# Patient Record
Sex: FEMALE | Race: WHITE | Employment: FULL TIME | URBAN - METROPOLITAN AREA
[De-identification: names, ages, dates, MRNs, and addresses within clinical notes are randomized per-mention and may not be internally consistent; named-entity substitution may affect disease eponyms.]

---

## 2017-02-07 ENCOUNTER — HOSPITAL ENCOUNTER (OUTPATIENT)
Dept: ULTRASOUND IMAGING | Facility: CLINIC | Age: 42
Discharge: HOME | End: 2017-02-07
Attending: INTERNAL MEDICINE | Admitting: INTERNAL MEDICINE
Payer: COMMERCIAL

## 2017-02-07 DIAGNOSIS — E04.1 THYROID NODULE: ICD-10-CM

## 2017-02-07 PROCEDURE — 00000102 ZZHCL STATISTIC CYTO WRIGHT STAIN TC: Performed by: RADIOLOGY

## 2017-02-07 PROCEDURE — 88173 CYTOPATH EVAL FNA REPORT: CPT | Performed by: RADIOLOGY

## 2017-02-07 PROCEDURE — 76942 ECHO GUIDE FOR BIOPSY: CPT

## 2017-02-07 PROCEDURE — 88173 CYTOPATH EVAL FNA REPORT: CPT | Mod: 26 | Performed by: RADIOLOGY

## 2017-02-07 NOTE — PROGRESS NOTES
RADIOLOGY PROCEDURE NOTE  Patient name: Kathi Linn  MRN: [id number]  : 1975    Pre-procedure diagnosis: Right thyroid nodule  Post-procedure diagnosis: Same    Procedure Date/Time: 2017  10:32 AM  Procedure: US guided biopsy  Estimated blood loss: None  Specimen(s) collected with description: Thyroid nodule tissue  The patient tolerated the procedure well with no immediate complications.  Significant findings:4 FNA  passes made and submitted to pathology.    See imaging dictation for procedural details.    Provider name: Rogers Cortez  Assistant(s):None

## 2017-02-07 NOTE — PROGRESS NOTES
Repeat single right thyroid nodule well tolerated by Dr. Cortez.  Sterile dressing to site after pressure held.  DC instructions reviewed and understood.  Pt dc ambulatory to home per self with no evident bleeding or complications.

## 2017-02-08 LAB — COPATH REPORT: NORMAL

## 2017-02-08 ASSESSMENT — ENCOUNTER SYMPTOMS
DIZZINESS: 0
HEADACHES: 1
TINGLING: 0
EYE WATERING: 0
EYE PAIN: 1
TREMORS: 0
DOUBLE VISION: 0
NUMBNESS: 0
PARALYSIS: 0
EYE IRRITATION: 1
EYE REDNESS: 1
SPEECH CHANGE: 0
DISTURBANCES IN COORDINATION: 0
MEMORY LOSS: 0
WEAKNESS: 0
SEIZURES: 0
LOSS OF CONSCIOUSNESS: 0

## 2017-02-13 ENCOUNTER — OFFICE VISIT (OUTPATIENT)
Dept: ENDOCRINOLOGY | Facility: CLINIC | Age: 42
End: 2017-02-13

## 2017-02-13 VITALS
HEIGHT: 69 IN | DIASTOLIC BLOOD PRESSURE: 74 MMHG | WEIGHT: 198.12 LBS | HEART RATE: 58 BPM | SYSTOLIC BLOOD PRESSURE: 112 MMHG | BODY MASS INDEX: 29.35 KG/M2

## 2017-02-13 DIAGNOSIS — E04.1 THYROID NODULE: Primary | ICD-10-CM

## 2017-02-13 ASSESSMENT — PAIN SCALES - GENERAL: PAINLEVEL: NO PAIN (0)

## 2017-02-13 NOTE — PROGRESS NOTES
Endocrine Clinic Consult:     Reason for consult: Thyroid Nodule  Date: October 3, 2016  Referring Physician: Reno Oates      HPI:     Kathi Linn is a 41 year old year old female who presents for evaluation of  Thyroid Nodule. Kathi had some neck pain on the LEFT side of the neck and had difficulty swallowing back in summer. She felt a lump back then and also some voice change. She had pain radiating to the ear and therefore went to see the PCP who ordered labs that showed a normal TSH and negative TPO but a RIGHT sided thyroid nodule.     The US performed on 9/13/16 was reviewed including imaging were shown and discussed with patient.     Since then she has improved swallowing, neck pain has abated. She has not had ear pain. She denies preceding viral infection.     FNA of the right sided nodule was done and AUS was repeated.   Repeat FNA was done 1 week ago of the same nodule and benign result was reported.     Risk Factors:   No history of childhood radiation exposure, No history of thyroid cancer in the family, No history of goiter in the family and No history of autoimmune disorders in the family.     Clinical symptom assessment  Voice change noted by patient.   No history of heat intolerance, tremor, palpitation, excessive sweating, sleep disturbance, appetite changes, changes in bowel habits, weight changes, dry eye, changes in vision and diplopia     Other ROS:   Weight gain 6 lbs  No eye symptoms  No headache, change in vision, loss of peripheral vision  No neck pain, no other lumps in the neck  No change in breathing   No change in swallowing.   No swelling in extremities  No change in mental status.   Other ROS that were obtained were negative.     Menstrual history  LMP 1 month ago  Periods getting heavier  Contemplating IUD    Past Medical History   Diagnosis Date     Anemia      Gastro-oesophageal reflux disease      Hx of gastric ulcer      Irritable bowel syndrome      improved since  gastric surgery     Lumbago 1991     With Right Leg Radiation  MRI=L5-S1 disc     Mild intermittent asthma 9/19/2006     Obesity, unspecified      298# prior to gastric Bypass     Pneumonia, organism unspecified 05/00     RLL     Past Surgical History   Procedure Laterality Date     Cholecystectomy       C/section, classical  1999, 2002     Abdomen surgery       DUODENAL SWITCH  2007     [place] teeth[       Herniorrhaphy ventral  10/2/2013     Procedure: HERNIORRHAPHY VENTRAL;  Open Ventral hernia Repair with mesh;  Surgeon: Orly Dunlap MD;  Location: [facility] OR     Family History   Problem Relation Age of Onset     CANCER Father      NON-HODGKINS LYMPHOMA, DIABETIC ALSO     OSTEOPOROSIS Mother      Hypertension Mother      CEREBROVASCULAR DISEASE Mother      Depression Sister      LYMES DISEASE     Aneurysm Paternal Grandfather      age 41      CEREBROVASCULAR DISEASE Paternal Aunt      CEREBROVASCULAR DISEASE Maternal Grandmother      Melanoma Maternal Grandmother      CANCER Paternal Grandmother      lung     CANCER Maternal Grandfather      lung     Social History     Social History     Marital status:      Spouse name: N/A     Number of children: N/A     Years of education: N/A     Occupational History     Not on file.     Social History Main Topics     Smoking status: Former Smoker     Packs/day: 0.10     Years: 2.00     Quit date: 1/1/1998     Smokeless tobacco: Never Used      Comment: QUIT in 1998     Alcohol use 0.0 oz/week     0 Standard drinks or equivalent per week      Comment: 3 DRINKS PER WEEK     Drug use: No     Sexual activity: Yes     Partners: Male     Other Topics Concern     Not on file     Social History Narrative        Allergies   Allergen Reactions     Corticosteroids      flovent, pulmacort rash on face     Current Outpatient Prescriptions   Medication     albuterol (PROAIR HFA/PROVENTIL HFA/VENTOLIN HFA) 108 (90 BASE) MCG/ACT Inhaler     Zinc Acetate, Oral, (ZINC ACETATE  "PO)     naproxen sodium (ANAPROX) 550 MG tablet     ferrous gluconate (FERGON) 324 (38 FE) MG tablet     ibuprofen (ADVIL,MOTRIN) 200 MG tablet     cholecalciferol (VITAMIN D3) 5000 UNITS CAPS capsule     Magnesium Hydroxide (MAGNESIA PO)     BIOTIN     VITAMIN E     VITAMIN K PO     VITAMIN A EX     MULTIVITAMIN TABS   OR     CALCIUM 500 MG OR TABS     No current facility-administered medications for this visit.            Exam:  /74 (BP Location: Right arm, Patient Position: Chair, Cuff Size: Adult Large)  Pulse 58  Ht 1.753 m (5' 9\")  Wt 89.9 kg (198 lb 1.9 oz)  BMI 29.26 kg/m2    Constitutional: healthy, alert, no distress and cooperative  Head: Normocephalic. No masses, lesions, tenderness or abnormalities  Neck: Neck supple. No adenopathy. Thyroid  Slight asymmetry in the right side, normal size, could feel the lower border thyroid both sides. Carotids without bruits.  ENT: No throat congestion, no neck nodes or sinus tenderness  Cardiovascular: regular rhythm, no tachycardia  Respiratory: Lungs clear  Gastrointestinal: Abdomen soft, non-tender. BS normal. Striae alba.   Musculoskeletal: gait normal and normal muscle tone  Neurologic: Normal speech, reflexes normal.   Psychiatric: mentation appears normal       TSH   Date Value Ref Range Status   09/09/2016 1.56 0.40 - 4.00 mU/L Final   12/04/2007 1.63 0.4 - 5.0 mU/L Final   02/18/2003 2.84 0.4 - 5.0 mU/L Final       No results found for: T4]    CBC RESULTS:   Recent Labs   Lab Test  09/09/16   1333   WBC  7.4   RBC  4.15   HGB  12.4   HCT  37.9   MCV  91   MCH  29.9   MCHC  32.7   RDW  13.1   PLT  261     Recent Labs   Lab Test  06/30/15   1016  06/29/15   1253   NA  142  140   POTASSIUM  4.5  4.1   CHLORIDE  109  107   CO2  26  25   ANIONGAP  6  8   GLC  75  79   BUN  12  8   CR  0.76  0.70   PIETER  8.5  7.5*     Imaging shown to patient, result reviewed.   Patient Name: DORCAS GARCIA   MR#: [id number]   Specimen #: [id number]   Collected: 2/7/2017 "   Received: 2/7/2017   Reported: 2/8/2017 10:44   Ordering Phy(s): PATRIZIA BENAVIDES   Additional Phy(s): RUPENDRA DEV THAKU STEELE     For improved result formatting, select 'View Enhanced Report Format',   under Linked Documents section.     SPECIMEN/STAIN PROCESS:   FNA-right thyroid nodule        Pap-Cyto x 7, Clayton's stain-cyto x 4     ----------------------------------------------------------------     CYTOLOGIC INTERPRETATION:      FNA-right thyroid nodule:   Benign   Consistent with a benign nodule (includes adenomatoid nodule, colloid   nodule, etc.)     The [place] Implied Risk of Malignancy and Recommended Clinical   Management:   Benign has a 0-3% risk of malignancy, recommended management is clinical   follow-up     Specimen Adequacy: Satisfactory for evaluation.     Electronically signed out by:     Ollie Quarles M.D.     Processed and screened at [facility]     CLINICAL HISTORY:     ,     GROSS:     FNA-right thyroid nodule:  Received are 7fixed slides, processed for Pap   stain, and 4 air dried slides, processed for Clayton's stain. Thyroid   panel tube received in Cytology Lab. Sent directly to the Molecular   Diagnostic Lab for future studies.     MICROSCOPIC:   Follicular cellularity is overall low.  There are macro follicles and   microfollicles.  There is some nuclear artifact associated with smear   and overlying blood with scattered nuclear grooves but no diagnostic   pseudoinclusions.  There are a small number of histiocytes.  Watery   colloid is present in the background but relatively scanty.  There is   focal thicker colloid.  The features are consistent with a benign   thyroid nodule.  Clinical correlation is required.     CPT Codes:   A: 45268-EKEJ, 07619-HCLWCTP     TESTING LAB LOCATION:   [facility] [address] [place]  [address] [phone]     COLLECTION SITE:   Client:  [facility]   Location:  [facility] (R)     Assessment   Kahti Linn is a 41 year old years old female who is here for evaluation of Thyroid Nodule. The is no evidence of compressive symptoms. There is no evidence of clinical features suggestive of hyper or hypothyroidism. TSH was normal and TPO negative.       Thyroid Nodule - Right sided, 1.5 cm isoechoic Grade 3 vascularity with smooth margin and clear halo.     Nodule was detected incidentally on imaging study -since she had symptoms on the other sided  Initial FNA AUS followed by Benign result  FU US in 6 months then annually   Discussed about low risk of cancer but unclear about risk because of the AUS in the prior fNA    Voice change  Patient notes subtle voice change   If no change in next few months plan for speech therapy referral.       Gastric Bypass -- Duodenal Switch 2009  -- Vit D sufficient, normal calcium recently.   -- low ferritin.   -- continue supplementation.   -- DXA when perimenopausal.   -- Followed by hematologist for anemia -- annual transfusion.     Toi Apodaca MD  [id number]  Endocrinology Service

## 2017-02-13 NOTE — NURSING NOTE
"Chief Complaint   Patient presents with     RECHECK     f/u thyroid nodule        Initial /74 (BP Location: Right arm, Patient Position: Chair, Cuff Size: Adult Large)  Pulse 58  Ht 1.753 m (5' 9\")  Wt 89.9 kg (198 lb 1.9 oz)  BMI 29.26 kg/m2 Estimated body mass index is 29.26 kg/(m^2) as calculated from the following:    Height as of this encounter: 1.753 m (5' 9\").    Weight as of this encounter: 89.9 kg (198 lb 1.9 oz).  Medication Reconciliation: complete     Dora Patrick cma     "

## 2017-02-13 NOTE — LETTER
2/13/2017       RE: Kathi Linn  [id number]  [facility] [address]     Dear Colleague,    Thank you for referring your patient, Kathi Linn, to the [facility] ENDOCR[facility]OLOG[facility] at [facility]. Please see a copy of my visit note below.    Endocrine Clinic Consult:     Reason for consult: Thyroid Nodule  Date: October 3, 2016  Referring Physician: Reno Oates      HPI:     Kathi Linn is a 41 year old year old female who presents for evaluation of  Thyroid Nodule. Kathi had some neck pain on the LEFT side of the neck and had difficulty swallowing back in summer. She felt a lump back then and also some voice change. She had pain radiating to the ear and therefore went to see the PCP who ordered labs that showed a normal TSH and negative TPO but a RIGHT sided thyroid nodule.     The US performed on 9/13/16 was reviewed including imaging were shown and discussed with patient.     Since then she has improved swallowing, neck pain has abated. She has not had ear pain. She denies preceding viral infection.     FNA of the right sided nodule was done and AUS was repeated.   Repeat FNA was done 1 week ago of the same nodule and benign result was reported.     Risk Factors:   No history of childhood radiation exposure, No history of thyroid cancer in the family, No history of goiter in the family and No history of autoimmune disorders in the family.     Clinical symptom assessment  Voice change noted by patient.   No history of heat intolerance, tremor, palpitation, excessive sweating, sleep disturbance, appetite changes, changes in bowel habits, weight changes, dry eye, changes in vision and diplopia     Other ROS:   Weight gain 6 lbs  No eye symptoms  No headache, change in vision, loss of peripheral vision  No neck pain, no other lumps in the neck  No change in breathing   No change in swallowing.   No swelling in extremities  No change in mental status.   Other ROS  that were obtained were negative.     Menstrual history  LMP 1 month ago  Periods getting heavier  Contemplating IUD    Past Medical History   Diagnosis Date     Anemia      Gastro-oesophageal reflux disease      Hx of gastric ulcer      Irritable bowel syndrome      improved since gastric surgery     Lumbago 1991     With Right Leg Radiation  MRI=L5-S1 disc     Mild intermittent asthma 9/19/2006     Obesity, unspecified      298# prior to gastric Bypass     Pneumonia, organism unspecified 05/00     RLL     Past Surgical History   Procedure Laterality Date     Cholecystectomy       C/section, classical  1999, 2002     Abdomen surgery       DUODENAL SWITCH  2007     [place] teeth[       Herniorrhaphy ventral  10/2/2013     Procedure: HERNIORRHAPHY VENTRAL;  Open Ventral hernia Repair with mesh;  Surgeon: Orly Dunlap MD;  Location: [facility] OR     Family History   Problem Relation Age of Onset     CANCER Father      NON-HODGKINS LYMPHOMA, DIABETIC ALSO     OSTEOPOROSIS Mother      Hypertension Mother      CEREBROVASCULAR DISEASE Mother      Depression Sister      LYMES DISEASE     Aneurysm Paternal Grandfather      age 41      CEREBROVASCULAR DISEASE Paternal Aunt      CEREBROVASCULAR DISEASE Maternal Grandmother      Melanoma Maternal Grandmother      CANCER Paternal Grandmother      lung     CANCER Maternal Grandfather      lung     Social History     Social History     Marital status:      Spouse name: N/A     Number of children: N/A     Years of education: N/A     Occupational History     Not on file.     Social History Main Topics     Smoking status: Former Smoker     Packs/day: 0.10     Years: 2.00     Quit date: 1/1/1998     Smokeless tobacco: Never Used      Comment: QUIT in 1998     Alcohol use 0.0 oz/week     0 Standard drinks or equivalent per week      Comment: 3 DRINKS PER WEEK     Drug use: No     Sexual activity: Yes     Partners: Male     Other Topics Concern     Not on file     Social  "History Narrative        Allergies   Allergen Reactions     Corticosteroids      flovent, pulmacort rash on face     Current Outpatient Prescriptions   Medication     albuterol (PROAIR HFA/PROVENTIL HFA/VENTOLIN HFA) 108 (90 BASE) MCG/ACT Inhaler     Zinc Acetate, Oral, (ZINC ACETATE PO)     naproxen sodium (ANAPROX) 550 MG tablet     ferrous gluconate (FERGON) 324 (38 FE) MG tablet     ibuprofen (ADVIL,MOTRIN) 200 MG tablet     cholecalciferol (VITAMIN D3) 5000 UNITS CAPS capsule     Magnesium Hydroxide (MAGNESIA PO)     BIOTIN     VITAMIN E     VITAMIN K PO     VITAMIN A EX     MULTIVITAMIN TABS   OR     CALCIUM 500 MG OR TABS     No current facility-administered medications for this visit.            Exam:  /74 (BP Location: Right arm, Patient Position: Chair, Cuff Size: Adult Large)  Pulse 58  Ht 1.753 m (5' 9\")  Wt 89.9 kg (198 lb 1.9 oz)  BMI 29.26 kg/m2    Constitutional: healthy, alert, no distress and cooperative  Head: Normocephalic. No masses, lesions, tenderness or abnormalities  Neck: Neck supple. No adenopathy. Thyroid  Slight asymmetry in the right side, normal size, could feel the lower border thyroid both sides. Carotids without bruits.  ENT: No throat congestion, no neck nodes or sinus tenderness  Cardiovascular: regular rhythm, no tachycardia  Respiratory: Lungs clear  Gastrointestinal: Abdomen soft, non-tender. BS normal. Striae alba.   Musculoskeletal: gait normal and normal muscle tone  Neurologic: Normal speech, reflexes normal.   Psychiatric: mentation appears normal       TSH   Date Value Ref Range Status   09/09/2016 1.56 0.40 - 4.00 mU/L Final   12/04/2007 1.63 0.4 - 5.0 mU/L Final   02/18/2003 2.84 0.4 - 5.0 mU/L Final       No results found for: T4]    CBC RESULTS:   Recent Labs   Lab Test  09/09/16   1333   WBC  7.4   RBC  4.15   HGB  12.4   HCT  37.9   MCV  91   MCH  29.9   MCHC  32.7   RDW  13.1   PLT  261     Recent Labs   Lab Test  06/30/15   1016  06/29/15   1253   NA  142  " 140   POTASSIUM  4.5  4.1   CHLORIDE  109  107   CO2  26  25   ANIONGAP  6  8   GLC  75  79   BUN  12  8   CR  0.76  0.70   PIETER  8.5  7.5*     Imaging shown to patient, result reviewed.   Patient Name: DORCAS GARCIA   MR#: [id number]   Specimen #: [id number]   Collected: 2/7/2017   Received: 2/7/2017   Reported: 2/8/2017 10:44   Ordering Phy(s): PATRIZIA BENAVIDES   Additional Phy(s): RUPENDRA DEV THAKU STEELE     For improved result formatting, select 'View Enhanced Report Format',   under Linked Documents section.     SPECIMEN/STAIN PROCESS:   FNA-right thyroid nodule        Pap-Cyto x 7, Clayton's stain-cyto x 4     ----------------------------------------------------------------     CYTOLOGIC INTERPRETATION:      FNA-right thyroid nodule:   Benign   Consistent with a benign nodule (includes adenomatoid nodule, colloid   nodule, etc.)     The [place] Implied Risk of Malignancy and Recommended Clinical   Management:   Benign has a 0-3% risk of malignancy, recommended management is clinical   follow-up     Specimen Adequacy: Satisfactory for evaluation.     Electronically signed out by:     Ollie Quarles M.D.     Processed and screened at [facility]     CLINICAL HISTORY:     ,     GROSS:     FNA-right thyroid nodule:  Received are 7fixed slides, processed for Pap   stain, and 4 air dried slides, processed for Clayton's stain. Thyroid   panel tube received in Cytology Lab. Sent directly to the Molecular   Diagnostic Lab for future studies.     MICROSCOPIC:   Follicular cellularity is overall low.  There are macro follicles and   microfollicles.  There is some nuclear artifact associated with smear   and overlying blood with scattered nuclear grooves but no diagnostic   pseudoinclusions.  There are a small number of histiocytes.  Watery   colloid is present in the background but relatively scanty.  There is   focal thicker colloid.  The features are  consistent with a benign   thyroid nodule.  Clinical correlation is required.     CPT Codes:   A: 88458-WTTC, 63608-[id number]     TESTING LAB LOCATION:   [facility]   [address] [place]  [address] [phone]     COLLECTION SITE:   Client:  [facility]   Location:  [facility] (R)     Assessment   Kathi Linn is a 41 year old years old female who is here for evaluation of Thyroid Nodule. The is no evidence of compressive symptoms. There is no evidence of clinical features suggestive of hyper or hypothyroidism. TSH was normal and TPO negative.       Thyroid Nodule - Right sided, 1.5 cm isoechoic Grade 3 vascularity with smooth margin and clear halo.     Nodule was detected incidentally on imaging study -since she had symptoms on the other sided  Initial FNA AUS followed by Benign result  FU US in 6 months then annually   Discussed about low risk of cancer but unclear about risk because of the AUS in the prior fNA    Voice change  Patient notes subtle voice change   If no change in next few months plan for speech therapy referral.       Gastric Bypass -- Duodenal Switch 2009  -- Vit D sufficient, normal calcium recently.   -- low ferritin.   -- continue supplementation.   -- DXA when perimenopausal.   -- Followed by hematologist for anemia -- annual transfusion.     Toi Apodaca MD  [phone]  Endocrinology Service    Again, thank you for allowing me to participate in the care of your patient.      Sincerely,    Toi Apodaca MD

## 2017-02-13 NOTE — MR AVS SNAPSHOT
After Visit Summary   2/13/2017    Kathi Linn    MRN: [id number]           Patient Information     Date Of Birth          1975        Visit Information        Provider Department      2/13/2017 12:30 PM Toi Apodaca MD [facility]        Today's Diagnoses     Thyroid nodule    -  1      Care Instructions    Thyroid US in about a year from prior ultrasound.   Thyroid function test ordered.   FU in about 6 months.         Follow-ups after your visit        Follow-up notes from your care team     Return in about 6 months (around 8/13/2017).      Your next 10 appointments already scheduled     Aug 14, 2017 12:30 PM CDT   (Arrive by 12:15 PM)   RETURN ENDOCRINE with Toi Apodaca MD   [facility] ([facility] [place])    [address] [place] [address] [phone]              Future tests that were ordered for you today     Open Future Orders        Priority Expected Expires Ordered    TSH Routine  2/13/2018 2/13/2017    T4 free Routine  2/13/2018 2/13/2017    US Thyroid Routine  9/11/2017 2/13/2017            Who to contact     Please call your clinic at [phone] to:    Ask questions about your health    Make or cancel appointments    Discuss your medicines    Learn about your test results    Speak to your doctor   If you have compliments or concerns about an experience at your clinic, or if you wish to file a complaint, please contact [facility] Patient Relations at [phone] or email us at [email or web address]         Additional Information About Your Visit        MyChart Information     [employer] gives you secure access to your electronic health record. If you see a primary care provider, you can also send messages to your care team and make appointments. If you have questions, please call your primary care clinic.  If you do not have a primary care  "provider, please call [phone] and they will assist you.      [employer] is an electronic gateway that provides easy, online access to your medical records. With [employer], you can request a clinic appointment, read your test results, renew a prescription or communicate with your care team.     To access your existing account, please contact your [facility] or call [phone] for assistance.        Care EveryWhere ID     This is your Care EveryWhere ID. This could be used by other organizations to access your [place] medical records  [id number]        Your Vitals Were     Pulse Height BMI (Body Mass Index)             58 1.753 m (5' 9\") 29.26 kg/m2          Blood Pressure from Last 3 Encounters:   02/13/17 112/74   12/27/16 128/82   10/03/16 129/85    Weight from Last 3 Encounters:   02/13/17 89.9 kg (198 lb 1.9 oz)   12/27/16 87.1 kg (192 lb)   10/03/16 87.1 kg (192 lb)                 Today's Medication Changes          These changes are accurate as of: 2/13/17  6:33 PM.  If you have any questions, ask your nurse or doctor.               Stop taking these medicines if you haven't already. Please contact your care team if you have questions.     azithromycin 500 MG tablet   Commonly known as:  ZITHROMAX           gabapentin 600 MG tablet   Commonly known as:  NEURONTIN           tiZANidine 4 MG tablet   Commonly known as:  ZANAFLEX                    Primary Care Provider Office Phone # Fax #    Reno Oates [phone]       [facility] [address] [place] [address]        Thank you!     Thank you for choosing [facility] ENDOCRIN[facility]  for your care. Our goal is always to provide you with excellent care. Hearing back from our patients is one way we can continue to improve our services. Please take a few minutes to complete the written survey that you may receive in the mail after your visit with us. Thank you!             Your " Updated Medication List - Protect others around you: Learn how to safely use, store and throw away your medicines at www.disposemymeds.org.          This list is accurate as of: 2/13/17  6:33 PM.  Always use your most recent med list.                   Brand Name Dispense Instructions for use    albuterol 108 (90 BASE) MCG/ACT Inhaler    PROAIR HFA/PROVENTIL HFA/VENTOLIN HFA    1 Inhaler    Inhale 1-2 puffs into the lungs every 4 hours as needed for shortness of breath / dyspnea or wheezing       BIOTIN      1 tablet daily       calcium carbonate 500 MG tablet    OS-PIETER 500 mg [place]. Ca         cholecalciferol 5000 UNITS Caps capsule    vitamin D3     Take 5,000 Units by mouth daily       ferrous gluconate 324 (38 FE) MG tablet    FERGON    30 tablet    Take 1 tablet (324 mg) by mouth 2 times daily       ibuprofen 200 MG tablet    ADVIL/MOTRIN    100 tablet    Take 1-2 tablets (200-400 mg) by mouth every 6 hours as needed for other (mild pain)       MAGNESIA PO      Take 1 tablet by mouth daily       MULTIVITAMIN TABS   OR          naproxen sodium 550 MG tablet    ANAPROX         VITAMIN A EX      5000 1 tab qd       VITAMIN E      1 tablet daily.       VITAMIN K PO      Take 1 tablet by mouth daily.       ZINC ACETATE PO

## 2017-02-13 NOTE — PATIENT INSTRUCTIONS
Thyroid US in about a year from prior ultrasound.   Thyroid function test ordered.   FU in about 6 months.

## 2017-03-24 ENCOUNTER — TRANSFERRED RECORDS (OUTPATIENT)
Dept: HEALTH INFORMATION MANAGEMENT | Facility: CLINIC | Age: 42
End: 2017-03-24

## 2017-05-31 ENCOUNTER — TRANSFERRED RECORDS (OUTPATIENT)
Dept: HEALTH INFORMATION MANAGEMENT | Facility: CLINIC | Age: 42
End: 2017-05-31

## 2017-06-17 ENCOUNTER — HEALTH MAINTENANCE LETTER (OUTPATIENT)
Age: 42
End: 2017-06-17

## 2017-06-29 ENCOUNTER — TELEPHONE (OUTPATIENT)
Dept: INTERNAL MEDICINE | Facility: CLINIC | Age: 42
End: 2017-06-29

## 2017-06-29 NOTE — TELEPHONE ENCOUNTER
Pt swallowed hot potatoes x 10-15 min ago.  Then drank cold water.    Right after swallowing hot potatoes, c/o chest pain/back pain radiating to L arm.  Vomited 5 min later.    Now, chest pain has subsided but still con't with back pain.     (States she is going to phys therapy for neck and shoulder issues.)    Advised UC for eval--unsure if potatoes hot enough to cause damage to esophagus/stomach and eval of the back pain.

## 2017-06-30 ENCOUNTER — APPOINTMENT (OUTPATIENT)
Dept: GENERAL RADIOLOGY | Facility: CLINIC | Age: 42
End: 2017-06-30
Attending: NURSE PRACTITIONER
Payer: COMMERCIAL

## 2017-06-30 ENCOUNTER — OFFICE VISIT (OUTPATIENT)
Dept: URGENT CARE | Facility: URGENT CARE | Age: 42
End: 2017-06-30
Payer: COMMERCIAL

## 2017-06-30 ENCOUNTER — HOSPITAL ENCOUNTER (EMERGENCY)
Facility: CLINIC | Age: 42
Discharge: HOME | End: 2017-06-30
Attending: NURSE PRACTITIONER | Admitting: NURSE PRACTITIONER
Payer: COMMERCIAL

## 2017-06-30 VITALS
OXYGEN SATURATION: 99 % | TEMPERATURE: 98.3 F | SYSTOLIC BLOOD PRESSURE: 118 MMHG | DIASTOLIC BLOOD PRESSURE: 72 MMHG | RESPIRATION RATE: 16 BRPM | HEART RATE: 64 BPM

## 2017-06-30 VITALS
WEIGHT: 193.8 LBS | DIASTOLIC BLOOD PRESSURE: 70 MMHG | OXYGEN SATURATION: 97 % | BODY MASS INDEX: 28.71 KG/M2 | TEMPERATURE: 99.5 F | HEIGHT: 69 IN | SYSTOLIC BLOOD PRESSURE: 100 MMHG

## 2017-06-30 DIAGNOSIS — M54.9 UPPER BACK PAIN: ICD-10-CM

## 2017-06-30 DIAGNOSIS — M54.6 ACUTE MIDLINE THORACIC BACK PAIN: Primary | ICD-10-CM

## 2017-06-30 DIAGNOSIS — R11.10 VOMITING, INTRACTABILITY OF VOMITING NOT SPECIFIED, PRESENCE OF NAUSEA NOT SPECIFIED, UNSPECIFIED VOMITING TYPE: ICD-10-CM

## 2017-06-30 DIAGNOSIS — K20.90 ESOPHAGITIS, ACUTE: Primary | ICD-10-CM

## 2017-06-30 LAB
ALBUMIN SERPL-MCNC: 3.6 G/DL (ref 3.4–5)
ALP SERPL-CCNC: 70 U/L (ref 40–150)
ALT SERPL W P-5'-P-CCNC: 33 U/L (ref 0–50)
ANION GAP SERPL CALCULATED.3IONS-SCNC: 6 MMOL/L (ref 3–14)
AST SERPL W P-5'-P-CCNC: 42 U/L (ref 0–45)
BASOPHILS # BLD AUTO: 0.1 10E9/L (ref 0–0.2)
BASOPHILS NFR BLD AUTO: 0.5 %
BILIRUB SERPL-MCNC: 1.2 MG/DL (ref 0.2–1.3)
BUN SERPL-MCNC: 16 MG/DL (ref 7–30)
CALCIUM SERPL-MCNC: 8.3 MG/DL (ref 8.5–10.1)
CHLORIDE SERPL-SCNC: 107 MMOL/L (ref 94–109)
CO2 SERPL-SCNC: 25 MMOL/L (ref 20–32)
CREAT SERPL-MCNC: 0.8 MG/DL (ref 0.52–1.04)
DIFFERENTIAL METHOD BLD: ABNORMAL
EOSINOPHIL # BLD AUTO: 0.4 10E9/L (ref 0–0.7)
EOSINOPHIL NFR BLD AUTO: 3.4 %
ERYTHROCYTE [DISTWIDTH] IN BLOOD BY AUTOMATED COUNT: 12 % (ref 10–15)
GFR SERPL CREATININE-BSD FRML MDRD: 79 ML/MIN/1.7M2
GLUCOSE SERPL-MCNC: 79 MG/DL (ref 70–99)
HCT VFR BLD AUTO: 39.9 % (ref 35–47)
HGB BLD-MCNC: 12.8 G/DL (ref 11.7–15.7)
IMM GRANULOCYTES # BLD: 0 10E9/L (ref 0–0.4)
IMM GRANULOCYTES NFR BLD: 0.3 %
LIPASE SERPL-CCNC: 157 U/L (ref 73–393)
LYMPHOCYTES # BLD AUTO: 2.4 10E9/L (ref 0.8–5.3)
LYMPHOCYTES NFR BLD AUTO: 21.3 %
MCH RBC QN AUTO: 29.8 PG (ref 26.5–33)
MCHC RBC AUTO-ENTMCNC: 32.1 G/DL (ref 31.5–36.5)
MCV RBC AUTO: 93 FL (ref 78–100)
MONOCYTES # BLD AUTO: 1 10E9/L (ref 0–1.3)
MONOCYTES NFR BLD AUTO: 9 %
NEUTROPHILS # BLD AUTO: 7.3 10E9/L (ref 1.6–8.3)
NEUTROPHILS NFR BLD AUTO: 65.5 %
NRBC # BLD AUTO: 0 10*3/UL
NRBC BLD AUTO-RTO: 0 /100
PLATELET # BLD AUTO: 275 10E9/L (ref 150–450)
POTASSIUM SERPL-SCNC: 5.1 MMOL/L (ref 3.4–5.3)
PROT SERPL-MCNC: 7.1 G/DL (ref 6.8–8.8)
RBC # BLD AUTO: 4.3 10E12/L (ref 3.8–5.2)
SODIUM SERPL-SCNC: 138 MMOL/L (ref 133–144)
TROPONIN I SERPL-MCNC: NORMAL UG/L (ref 0–0.04)
WBC # BLD AUTO: 11.1 10E9/L (ref 4–11)

## 2017-06-30 PROCEDURE — 84484 ASSAY OF TROPONIN QUANT: CPT | Performed by: NURSE PRACTITIONER

## 2017-06-30 PROCEDURE — 99207 ZZC NO BILLABLE SERVICE THIS VISIT: CPT | Performed by: FAMILY MEDICINE

## 2017-06-30 PROCEDURE — 83690 ASSAY OF LIPASE: CPT | Performed by: NURSE PRACTITIONER

## 2017-06-30 PROCEDURE — 71020 XR CHEST 2 VW: CPT

## 2017-06-30 PROCEDURE — 96375 TX/PRO/DX INJ NEW DRUG ADDON: CPT

## 2017-06-30 PROCEDURE — 93005 ELECTROCARDIOGRAM TRACING: CPT

## 2017-06-30 PROCEDURE — 80053 COMPREHEN METABOLIC PANEL: CPT | Performed by: NURSE PRACTITIONER

## 2017-06-30 PROCEDURE — 25000128 H RX IP 250 OP 636: Performed by: NURSE PRACTITIONER

## 2017-06-30 PROCEDURE — 85025 COMPLETE CBC W/AUTO DIFF WBC: CPT | Performed by: NURSE PRACTITIONER

## 2017-06-30 PROCEDURE — 96374 THER/PROPH/DIAG INJ IV PUSH: CPT

## 2017-06-30 PROCEDURE — 99285 EMERGENCY DEPT VISIT HI MDM: CPT | Mod: 25

## 2017-06-30 PROCEDURE — 96361 HYDRATE IV INFUSION ADD-ON: CPT

## 2017-06-30 RX ORDER — HYDROMORPHONE HYDROCHLORIDE 1 MG/ML
0.5 INJECTION, SOLUTION INTRAMUSCULAR; INTRAVENOUS; SUBCUTANEOUS
Status: COMPLETED | OUTPATIENT
Start: 2017-06-30 | End: 2017-06-30

## 2017-06-30 RX ORDER — SACCHAROMYCES BOULARDII 250 MG
250 CAPSULE ORAL 2 TIMES DAILY
COMMUNITY
End: 2018-06-05

## 2017-06-30 RX ORDER — OXYCODONE AND ACETAMINOPHEN 5; 325 MG/1; MG/1
1-2 TABLET ORAL EVERY 6 HOURS PRN
Qty: 20 TABLET | Refills: 0 | Status: SHIPPED | OUTPATIENT
Start: 2017-06-30 | End: 2018-06-05

## 2017-06-30 RX ORDER — SODIUM CHLORIDE 9 MG/ML
1000 INJECTION, SOLUTION INTRAVENOUS CONTINUOUS
Status: DISCONTINUED | OUTPATIENT
Start: 2017-06-30 | End: 2017-06-30 | Stop reason: HOSPADM

## 2017-06-30 RX ORDER — ONDANSETRON 4 MG/1
4 TABLET, ORALLY DISINTEGRATING ORAL EVERY 8 HOURS PRN
Qty: 10 TABLET | Refills: 0 | Status: SHIPPED | OUTPATIENT
Start: 2017-06-30 | End: 2017-07-03

## 2017-06-30 RX ORDER — GABAPENTIN 600 MG/1
600 TABLET ORAL DAILY
Refills: 6 | COMMUNITY
Start: 2017-05-31 | End: 2018-06-05

## 2017-06-30 RX ORDER — ONDANSETRON 2 MG/ML
4 INJECTION INTRAMUSCULAR; INTRAVENOUS ONCE
Status: COMPLETED | OUTPATIENT
Start: 2017-06-30 | End: 2017-06-30

## 2017-06-30 RX ADMIN — ONDANSETRON 4 MG: 2 INJECTION INTRAMUSCULAR; INTRAVENOUS at 18:31

## 2017-06-30 RX ADMIN — SODIUM CHLORIDE 1000 ML: 9 INJECTION, SOLUTION INTRAVENOUS at 18:31

## 2017-06-30 RX ADMIN — Medication 0.5 MG: at 18:31

## 2017-06-30 ASSESSMENT — ENCOUNTER SYMPTOMS
BACK PAIN: 1
SHORTNESS OF BREATH: 0
VOMITING: 1

## 2017-06-30 NOTE — MR AVS SNAPSHOT
After Visit Summary   6/30/2017    Kathi Linn    MRN: [id number]           Patient Information     Date Of Birth          1975        Visit Information        Provider Department      6/30/2017 4:10 PM Antwan Coleman MD [facility]        Today's Diagnoses     Acute midline thoracic back pain    -  1       Follow-ups after your visit        Your next 10 appointments already scheduled     Aug 14, 2017 12:30 PM CDT   (Arrive by 12:15 PM)   RETURN ENDOCRINE with Toi Apodaca MD   [facility] ([facility] [place])    [address] [place] [address] [phone]              Who to contact     If you have questions or need follow up information about today's clinic visit or your schedule please contact [facility] directly at [phone].  Normal or non-critical lab and imaging results will be communicated to you by MyChart, letter or phone within 4 business days after the clinic has received the results. If you do not hear from us within 7 days, please contact the clinic through [employer] or phone. If you have a critical or abnormal lab result, we will notify you by phone as soon as possible.  Submit refill requests through [employer] or call your pharmacy and they will forward the refill request to us. Please allow 3 business days for your refill to be completed.          Additional Information About Your Visit        MyChart Information     [employer] gives you secure access to your electronic health record. If you see a primary care provider, you can also send messages to your care team and make appointments. If you have questions, please call your primary care clinic.  If you do not have a primary care provider, please call [phone] and they will assist you.        Care EveryWhere ID     This is your Care EveryWhere ID. This could be used by other organizations to access your [facility]  "records  [id number]        Your Vitals Were     Temperature Height Pulse Oximetry BMI (Body Mass Index)          99.5  F (37.5  C) (Tympanic) 5' 9\" (1.753 m) 97% 28.62 kg/m2         Blood Pressure from Last 3 Encounters:   06/30/17 100/70   02/13/17 112/74   12/27/16 128/82    Weight from Last 3 Encounters:   06/30/17 193 lb 12.8 oz (87.9 kg)   02/13/17 198 lb 1.9 oz (89.9 kg)   12/27/16 192 lb (87.1 kg)              Today, you had the following     No orders found for display       Primary Care Provider Office Phone # Fax #    Reno Oates [phone]       XXX RESIGNED  [address] [facility] [address]  [facility] [address]        Equal Access to Services     [facility]: Hadii aad ku hadasho Soomaali, waaxda luqadaha, qaybta kaalmada adeegyada, amber king hayluis alberto ruth . So [facility] [phone].    ATENCIÓN: Si habla español, tiene a ventura disposición servicios gratuitos de asistencia lingüística. Llame al [phone].    We comply with applicable federal civil rights laws and [place] laws. We do not discriminate on the basis of race, color, national origin, age, disability sex, sexual orientation or gender identity.            Thank you!     Thank you for choosing [facility]  for your care. Our goal is always to provide you with excellent care. Hearing back from our patients is one way we can continue to improve our services. Please take a few minutes to complete the written survey that you may receive in the mail after your visit with us. Thank you!             Your Updated Medication List - Protect others around you: Learn how to safely use, store and throw away your medicines at www.disposemymeds.org.          This list is accurate as of: 6/30/17  4:42 PM.  Always use your most recent med list.                   Brand Name Dispense Instructions for use Diagnosis    albuterol 108 (90 BASE) MCG/ACT Inhaler    PROAIR HFA/PROVENTIL HFA/VENTOLIN HFA    1 Inhaler    " Inhale 1-2 puffs into the lungs every 4 hours as needed for shortness of breath / dyspnea or wheezing    Mild intermittent asthma with exacerbation       BIOTIN      1 tablet daily        calcium carbonate 1250 MG tablet    OS-PIETER 500 mg [place]. Ca      Acute upper respiratory infections of other multiple sites       cholecalciferol 5000 UNITS Caps capsule    vitamin D3     Take 5,000 Units by mouth daily        ferrous gluconate 324 (38 FE) MG tablet    FERGON    30 tablet    Take 1 tablet (324 mg) by mouth 2 times daily        gabapentin 600 MG tablet    NEURONTIN     Take 600 mg by mouth daily        ibuprofen 200 MG tablet    ADVIL/MOTRIN    100 tablet    Take 1-2 tablets (200-400 mg) by mouth every 6 hours as needed for other (mild pain)    Hernia, ventral       MAGNESIA PO      Take 1 tablet by mouth daily        MULTIVITAMIN TABS   OR       Acute upper respiratory infections of other multiple sites       naproxen sodium 550 MG tablet    ANAPROX          saccharomyces boulardii 250 MG capsule    FLORASTOR     Take 250 mg by mouth 2 times daily        tiZANidine 4 MG tablet    ZANAFLEX          VITAMIN A EX      5000 1 tab qd        VITAMIN E      1 tablet daily.        VITAMIN K PO      Take 1 tablet by mouth daily.        ZINC ACETATE PO

## 2017-06-30 NOTE — ED AVS SNAPSHOT
[facility] Emergency Department    [address]    [facility] [address]    Phone:  [phone]    Fax:  [phone]                                       Kathi Linn   MRN: [id number]    Department:  [facility] Emergency Department   Date of Visit:  6/30/2017           Patient Information     Date Of Birth          1975        Your diagnoses for this visit were:     Upper back pain     Vomiting, intractability of vomiting not specified, presence of nausea not specified, unspecified vomiting type     Esophagitis, acute        You were seen by Masood Mondragon, APRN CNP.      Follow-up Information     Follow up with Reno Oates MD In 1 week.    Specialty:  Internal Medicine    Contact information:    XXX RESIGNED XXX  [address]  [facility] [address] [phone]        Discharge References/Attachments     ESOPHAGITIS (ENGLISH)      Future Appointments        Provider Department Dept Phone Center    8/14/2017 12:30 PM Toi Apodaca MD [facility] Endocrinology [phone] [facility]      24 Hour Appointment Hotline       To make an appointment at any [facility], call [phone] ([phone]). If you don't have a family doctor or clinic, we will help you find one. [place] clinics are conveniently located to serve the needs of you and your family.             Review of your medicines      START taking        Dose / Directions Last dose taken    omeprazole 20 MG CR capsule   Commonly known as:  priLOSEC   Dose:  20 mg   Quantity:  30 capsule        Take 1 capsule (20 mg) by mouth daily   Refills:  0        ondansetron 4 MG ODT tab   Commonly known as:  ZOFRAN ODT   Dose:  4 mg   Quantity:  10 tablet        Take 1 tablet (4 mg) by mouth every 8 hours as needed for nausea   Refills:  0        oxyCODONE-acetaminophen 5-325 MG per tablet   Commonly known as:  PERCOCET   Dose:  1-2 tablet   Quantity:  20 tablet        Take 1-2 tablets by  mouth every 6 hours as needed for moderate to severe pain   Refills:  0          Our records show that you are taking the medicines listed below. If these are incorrect, please call your family doctor or clinic.        Dose / Directions Last dose taken    albuterol 108 (90 BASE) MCG/ACT Inhaler   Commonly known as:  PROAIR HFA/PROVENTIL HFA/VENTOLIN HFA   Dose:  1-2 puff   Quantity:  1 Inhaler        Inhale 1-2 puffs into the lungs every 4 hours as needed for shortness of breath / dyspnea or wheezing   Refills:  0        BIOTIN   Dose:  1 tablet        1 tablet daily   Refills:  0        calcium carbonate 1250 MG tablet   Commonly known as:  OS-PIETER 500 mg [place]. Ca        Refills:  0        cholecalciferol 5000 UNITS Caps capsule   Commonly known as:  vitamin D3   Dose:  5000 Units        Take 5,000 Units by mouth daily   Refills:  0        ferrous gluconate 324 (38 FE) MG tablet   Commonly known as:  FERGON   Dose:  324 mg   Quantity:  30 tablet        Take 1 tablet (324 mg) by mouth 2 times daily   Refills:  0        gabapentin 600 MG tablet   Commonly known as:  NEURONTIN   Dose:  600 mg        Take 600 mg by mouth daily   Refills:  6        ibuprofen 200 MG tablet   Commonly known as:  ADVIL/MOTRIN   Dose:  200-400 mg   Quantity:  100 tablet        Take 1-2 tablets (200-400 mg) by mouth every 6 hours as needed for other (mild pain)   Refills:  0        MAGNESIA PO   Dose:  1 tablet        Take 1 tablet by mouth daily   Refills:  0        MULTIVITAMIN TABS   OR        Refills:  0        naproxen sodium 550 MG tablet   Commonly known as:  ANAPROX        Refills:  3        saccharomyces boulardii 250 MG capsule   Commonly known as:  FLORASTOR   Dose:  250 mg        Take 250 mg by mouth 2 times daily   Refills:  0        tiZANidine 4 MG tablet   Commonly known as:  ZANAFLEX        Refills:  0        VITAMIN A EX        5000 1 tab qd   Refills:  0        VITAMIN E   Dose:  1 tablet        1 tablet daily.   Refills:  0         VITAMIN K PO   Dose:  1 tablet        Take 1 tablet by mouth daily.   Refills:  0        ZINC ACETATE PO        Refills:  0                Prescriptions were sent or printed at these locations (3 Prescriptions)                   Other Prescriptions                Printed at Department/Unit printer (3 of 3)         oxyCODONE-acetaminophen (PERCOCET) 5-325 MG per tablet               ondansetron (ZOFRAN ODT) 4 MG ODT tab               omeprazole (PRILOSEC) 20 MG CR capsule                Procedures and tests performed during your visit     CBC with platelets differential    Comprehensive metabolic panel    EKG 12 lead    Lipase    Troponin I    XR Chest 2 Views      Orders Needing Specimen Collection     None      Pending Results     Date and Time Order Name Status Description    6/30/2017 1822 EKG 12 lead Preliminary     6/30/2017 1822 XR Chest 2 Views Preliminary             Pending Culture Results     No orders found from 6/28/2017 to 7/1/2017.            Pending Results Instructions     If you had any lab results that were not finalized at the time of your Discharge, you can call the ED Lab Result RN at [phone]. You will be contacted by this team for any positive Lab results or changes in treatment. The nurses are available 7 days a week from 10A to 6:30P.  You can leave a message 24 hours per day and they will return your call.        Test Results From Your Hospital Stay        6/30/2017  6:33 PM      Component Results     Component Value Ref Range & Units Status    WBC 11.1 (H) 4.0 - 11.0 10e9/L Final    RBC Count 4.30 3.8 - 5.2 10e12/L Final    Hemoglobin 12.8 11.7 - 15.7 g/dL Final    Hematocrit 39.9 35.0 - 47.0 % Final    MCV 93 78 - 100 fl Final    MCH 29.8 26.5 - 33.0 pg Final    MCHC 32.1 31.5 - 36.5 g/dL Final    RDW 12.0 10.0 - 15.0 % Final    Platelet Count 275 150 - 450 10e9/L Final    Diff Method Automated Method  Final    % Neutrophils 65.5 % Final    % Lymphocytes 21.3 % Final    %  Monocytes 9.0 % Final    % Eosinophils 3.4 % Final    % Basophils 0.5 % Final    % Immature Granulocytes 0.3 % Final    Nucleated RBCs 0 0 /100 Final    Absolute Neutrophil 7.3 1.6 - 8.3 10e9/L Final    Absolute Lymphocytes 2.4 0.8 - 5.3 10e9/L Final    Absolute Monocytes 1.0 0.0 - 1.3 10e9/L Final    Absolute Eosinophils 0.4 0.0 - 0.7 10e9/L Final    Absolute Basophils 0.1 0.0 - 0.2 10e9/L Final    Abs Immature Granulocytes 0.0 0 - 0.4 10e9/L Final    Absolute Nucleated RBC 0.0  Final         6/30/2017  6:58 PM      Component Results     Component Value Ref Range & Units Status    Sodium 138 133 - 144 mmol/L Final    Potassium 5.1 3.4 - 5.3 mmol/L Final    Specimen slightly hemolyzed, potassium may be falsely elevated    Chloride 107 94 - 109 mmol/L Final    Carbon Dioxide 25 20 - 32 mmol/L Final    Anion Gap 6 3 - 14 mmol/L Final    Glucose 79 70 - 99 mg/dL Final    Urea Nitrogen 16 7 - 30 mg/dL Final    Creatinine 0.80 0.52 - 1.04 mg/dL Final    GFR Estimate 79 >60 mL/min/1.7m2 Final    Non  GFR Calc    GFR Estimate If Black >90   GFR Calc   >60 mL/min/1.7m2 Final    Calcium 8.3 (L) 8.5 - 10.1 mg/dL Final    Bilirubin Total 1.2 0.2 - 1.3 mg/dL Final    Albumin 3.6 3.4 - 5.0 g/dL Final    Protein Total 7.1 6.8 - 8.8 g/dL Final    Alkaline Phosphatase 70 40 - 150 U/L Final    ALT 33 0 - 50 U/L Final    AST 42 0 - 45 U/L Final    Specimen is hemolyzed which can falsely elevate AST. Analysis of a   non-hemolyzed   specimen may result in a lower value.           6/30/2017  6:58 PM      Component Results     Component Value Ref Range & Units Status    Lipase 157 73 - 393 U/L Final         6/30/2017  6:58 PM      Component Results     Component Value Ref Range & Units Status    Troponin I ES  0.000 - 0.045 ug/L Final    <0.015  The 99th percentile for upper reference range is 0.045 ug/L.  Troponin values in   the range of 0.045 - 0.120 ug/L may be associated with risks of adverse    clinical events.           6/30/2017  6:56 PM      Narrative     XR CHEST 2 VW   6/30/2017 6:54 PM     HISTORY: chest pain    COMPARISON: Film dated 6/8/2006    FINDINGS: The heart is negative.  The lungs are clear. The pulmonary  vasculature is normal.  The bones and soft tissues are unremarkable.        Impression     IMPRESSION: No active areas of infiltrate.                    Clinical Quality Measure: Blood Pressure Screening     Your blood pressure was checked while you were in the emergency department today. The last reading we obtained was  BP: 116/74 . Please read the guidelines below about what these numbers mean and what you should do about them.  If your systolic blood pressure (the top number) is less than 120 and your diastolic blood pressure (the bottom number) is less than 80, then your blood pressure is normal. There is nothing more that you need to do about it.  If your systolic blood pressure (the top number) is 120-139 or your diastolic blood pressure (the bottom number) is 80-89, your blood pressure may be higher than it should be. You should have your blood pressure rechecked within a year by a primary care provider.  If your systolic blood pressure (the top number) is 140 or greater or your diastolic blood pressure (the bottom number) is 90 or greater, you may have high blood pressure. High blood pressure is treatable, but if left untreated over time it can put you at risk for heart attack, stroke, or kidney failure. You should have your blood pressure rechecked by a primary care provider within the next 4 weeks.  If your provider in the emergency department today gave you specific instructions to follow-up with your doctor or provider even sooner than that, you should follow that instruction and not wait for up to 4 weeks for your follow-up visit.        Thank you for choosing [facility]       Thank you for choosing [facility] for your care. Our goal is always to provide you with excellent care.  Hearing back from our patients is one way we can continue to improve our services. Please take a few minutes to complete the written survey that you may receive in the mail after you visit with us. Thank you!        [employer] Information     [employer] gives you secure access to your electronic health record. If you see a primary care provider, you can also send messages to your care team and make appointments. If you have questions, please call your primary care clinic.  If you do not have a primary care provider, please call [phone] and they will assist you.        Care EveryWhere ID     This is your Care EveryWhere ID. This could be used by other organizations to access your [facility] medical records  [id number]        Equal Access to Services     GLORIA LUNA : Charlene Devi, katie harrison, cristiane shaikh, amber corcoran. So [facility] [phone].    ATENCIÓN: Si habla español, tiene a ventura disposición servicios gratuitos de asistencia lingüística. Llame al [phone].    We comply with applicable federal civil rights laws and [place] laws. We do not discriminate on the basis of race, color, national origin, age, disability sex, sexual orientation or gender identity.            After Visit Summary       This is your record. Keep this with you and show to your community pharmacist(s) and doctor(s) at your next visit.

## 2017-06-30 NOTE — ED AVS SNAPSHOT
[facility] Emergency Department    [address]    [facility] [address]    Phone:  [phone]    Fax:  [phone]                                       Kathi Linn   MRN: [id number]    Department:  [facility] Emergency Department   Date of Visit:  6/30/2017           After Visit Summary Signature Page     I have received my discharge instructions, and my questions have been answered. I have discussed any challenges I see with this plan with the nurse or doctor.    ..........................................................................................................................................  Patient/Patient Representative Signature      ..........................................................................................................................................  Patient Representative Print Name and Relationship to Patient    ..................................................               ................................................  Date                                            Time    ..........................................................................................................................................  Reviewed by Signature/Title    ...................................................              ..............................................  Date                                                            Time

## 2017-06-30 NOTE — NURSING NOTE
"Chief Complaint   Patient presents with     Musculoskeletal Problem     mid upper back pain- swallowed hot cheesy hashbrowns yesterday,pain more in back than chest and radiates to left arm , nausea        Initial /70  Temp 99.5  F (37.5  C) (Tympanic)  Ht 5' 9\" (1.753 m)  Wt 193 lb 12.8 oz (87.9 kg)  SpO2 97%  BMI 28.62 kg/m2 Estimated body mass index is 28.62 kg/(m^2) as calculated from the following:    Height as of this encounter: 5' 9\" (1.753 m).    Weight as of this encounter: 193 lb 12.8 oz (87.9 kg).  Medication Reconciliation: complete   Cherelle Monsalve MA// June 30, 2017 4:29 PM          "

## 2017-06-30 NOTE — ED NOTES
Patient presents complaining of back pain and vomiting. She reports that she swallowed some very hot food last evening, and immediately felt pain in her left upper back. Then later she began to have vomiting. She reports that today the pain is more severe and that she has vomited more. She is alert and oriented, ABCs intact.

## 2017-06-30 NOTE — PROGRESS NOTES
THIS IS A TRIAGE ENCOUNTER    Kathi Linn is a 41 year old female presenting with a chief complaint of mild midline lower chest pain and simultaneous, 7 out of 10 back pain radiating to the left arm (entire left arm at times) after swallowing two pieces of  hot hash browns before downing cold water.  The pain has continued to persist since then.   Patient vomited five minutes after the pain started yesterday and again just before arriving at the urgent care clinic.  Normal stools without coffee ground appearance.  No bright red blood from the stool.      Given the severity of the patient's pain and the possibility of a need for upper endoscopy, patient will go to an emergency room for further evaluation.    I told the patient that she would not be charged for this triage encounter.     Antwan Coleman MD

## 2017-06-30 NOTE — ED PROVIDER NOTES
History     Chief Complaint:  Vomiting and Back Pain      LAUREN Linn is a 41 year old female with a history of anemia and GERD who presents to the emergency department today for evaluation of back pain and vomiting. The patient has had constant mild midline lower chest pain and non-traumatic back pain spasms radiating to the left arm after swallowing two pieces of temperature hot hash browns yesterday. Of note, the patient has been going to a physical therapy daily for the last several days. The patient notes several minutes after pain started, the patient had pinkish vomiting. Prior to onset, the patient felt at baseline. Today, the patient had egg sandwich and peanut butter bar with worsening chest pain, back pain, and new throat pain radiating to back noting similar to reflux, and five episodes of vomit. The patient notes last episode of vomit was pinkish and is unsure if it had blood. Due to worsening pain, the patient visited urgent care who recommended she come in for evaluation. Here, the patient endorses worsening pain with palpitation to low mid back as well as continued back pain. She has no history back pain or chest pain. She denies shortness of breath.     CARDIAC RISK FACTORS  SEX:                  Female  Tobacco:             Neg.  Hypertension:        Neg.  Diabetes:             Neg.  Lipids:                Neg.  Personal History:  Neg.  Family History:       Neg.    Allergies:  Corticosteroids    Medications:    Gabapentin (neurontin) 600 mg tablet  Tizanidine (zanaflex) 4 mg tablet  Saccharomyces boulardii (florastor) 250 mg capsule  Albuterol (proair hfa/proventil hfa/ventolin hfa) 108 (90 base) mcg/act inhaler  Zinc acetate, oral, (zinc acetate po)  Naproxen sodium (anaprox) 550 mg tablet  Ferrous gluconate (fergon) 324 (38 fe) mg tablet  Ibuprofen (advil,motrin) 200 mg tablet  Cholecalciferol (vitamin d3) 5000 units caps capsule  Magnesium hydroxide (magnesia po)    Past Medical  History:    Anemia   Gastro-oesophageal reflux disease   Hx of gastric ulcer   Irritable bowel syndrome   Lumbago   Mild intermittent asthma    Obesity, unspecified   Pneumonia    Past Surgical History:    Abdomen surgery    Cholecystectomy  Herniorrhaphy  [place] teeth extraction    Family History:    Non-hodgkins lymphoma  Osteoporosis   Hypertension  Cerebrovascular disease  Lyme's disease  Aneurysm  Melanoma  Lung cancer  Depression    Social History:  Marital Status:   [2]  Tobacco: Former Smoker  Smokeless tobacco: Negative  Alcohol: Negative  Presents to the ED with her spouse.   PCP: Reno Oates    Review of Systems   Respiratory: Negative for shortness of breath.    Cardiovascular: Positive for chest pain.   Gastrointestinal: Positive for vomiting.   Musculoskeletal: Positive for back pain.   All other systems reviewed and are negative.  10 point review of systems performed and is negative except as above and in HPI.  Physical Exam   First Vitals:  BP: 132/87  Pulse: 74  Temp: 98.3  F (36.8  C)  Resp: 20  SpO2: 100 %      Physical Exam  General: Appears stated age  HENT: Atraumatic. TM's intact. no posterior oropharynx swelling/erythema, or exudate. No visible FB.  Eyes: No scleral injection, conjunctivitis, or drainage.    Neck: Supple with normal ROM.    Cardio: Regular rate and rhythm, no murmurs, rubs, or gallops  Pulmonary/Chest: Clear to ausculation bilaterally.    Abdomen: Soft, epigastric tenderness, normal bowel sounds, no rebound or guarding  Musculoskeletal: Midline c-spine thoracic tenderness, resolved after medications. Normal gait.    Lymph: No anterior or posterior lymphadenopathy.   Neuro: Alert and oriented, no focal deficits noted.   Skin: Normal color and temperature, no rashes to visible exposed skin.   Psych: Mood and affect normal.      Emergency Department Course   EC2017 18:30:00  Indication: chest pain  Findings:    Normal sinus rhythm   Normal ECG..    Ventricular rate: 66 bpm  CO interval: 184 ms  QRS duration: 84 ms  QT/QTc: 396/415 ms  R-Axis: 0  ECG read at 18:35 by Dr. Resendez.    Imaging:  Radiographic findings were communicated with the patient and family who voiced understanding of the findings.    XR Chest 2 Views  Preliminary Result  IMPRESSION: No active areas of infiltrate.        Laboratory:  CBC: WBC 11.1, HGB 12.8,   CMP: Calcium 8.3 o/w WNL. (Creatinine 0.80)  Lipase: 157  Troponin collected at 18:00:00: <0.015 (WNL).    Interventions:  18:31 Normal saline 1,000mL IV   18:31 Zofran 4mg IV   18:31 Dilaudid 0.5 mg IV    Emergency Department Course:  18:00 Blood drawn. This was sent to the lab for further testing, results above.  Nursing notes and vitals reviewed.  18:11 I performed an exam of the patient as documented above.   18:30 EKG obtained in the ED, see results above.   The patient was taken for CT scan, see imaging results above.   19:19 Recheck patient. I personally reviewed the laboratory and imaging results with the Patient and answered all related questions.  19:23 Paged GI.   19:31 I discussed the patient with Dr. Nunez of GI. Stated does not require emergent endoscopy as patient managing secretions and can follow up as an outpatient.   19:35 Recheck patient. Plan explained to the Patient and spouse. Patient discharged home with instructions regarding supportive care, medications, and reasons to return. The importance of close follow-up was reviewed.  Impression & Plan    Medical Decision Making:  Kathi Linn is a 41 year old female presents to the emergency department for back pain, trouble swallowing with feeling of food bolus, and vomiting after eating temperature hot foods. I considered a broad differential diagnosis for this patient including gastritis, GERD, cholecystitis, cholelithiasis, choledocholithiasis, biliary colic, pancreatitis, ulcer, acute coronary syndrome.  There are no signs of worrisome intra-abdominal  pathologies detected during the visit today.  The patient has a completely benign abdominal exam without rebound, guarding, or marked tenderness to palpation and back pain resolved after treatment. This appears to be esophagitis.  EKG showed no acute ischemic changes. Laboratory studies and x-ray was unremarkable. The patient improved with IV fluids and anti-emetics. Due to the patient reporting feeling of food bolus in throat, I contacted Dr. Nunez of GI. He stated the patient did not require emergent endoscopy as patient was managing secretions and can follow up as an outpatient. I discussed this with the patient and her  who understand and are in agreement with this plan. The patient was discharged home with pain medications and anti-emetics. Provided return precautions.    Diagnosis:    ICD-10-CM    1. Esophagitis, acute K20.9    2. Upper back pain M54.9    3. Vomiting, intractability of vomiting not specified, presence of nausea not specified, unspecified vomiting type R11.10        Disposition:  discharged to home    Discharge Medications:  New Prescriptions    OMEPRAZOLE (PRILOSEC) 20 MG CR CAPSULE    Take 1 capsule (20 mg) by mouth daily    ONDANSETRON (ZOFRAN ODT) 4 MG ODT TAB    Take 1 tablet (4 mg) by mouth every 8 hours as needed for nausea    OXYCODONE-ACETAMINOPHEN (PERCOCET) 5-325 MG PER TABLET    Take 1-2 tablets by mouth every 6 hours as needed for moderate to severe pain       Scribe Disclosure:  I, Diana Tinoco, am serving as a scribe at 6:11 PM on 6/30/2017 to document services personally performed by Masood Mondragon, YOLANDA C, based on my observations and the provider's statements to me.  Diana Tinoco  6/30/2017   [facility] EMERGENCY DEPARTMENT       Masood Mondragon, YOLANDA CUMMINGS  07/01/17 0228

## 2017-07-01 LAB — INTERPRETATION ECG - MUSE: NORMAL

## 2017-08-22 ENCOUNTER — HOSPITAL ENCOUNTER (EMERGENCY)
Facility: CLINIC | Age: 42
Discharge: HOME | End: 2017-08-22
Attending: EMERGENCY MEDICINE | Admitting: EMERGENCY MEDICINE
Payer: COMMERCIAL

## 2017-08-22 VITALS
SYSTOLIC BLOOD PRESSURE: 108 MMHG | WEIGHT: 190 LBS | RESPIRATION RATE: 20 BRPM | HEART RATE: 64 BPM | DIASTOLIC BLOOD PRESSURE: 64 MMHG | OXYGEN SATURATION: 94 % | TEMPERATURE: 97.1 F | BODY MASS INDEX: 28.06 KG/M2

## 2017-08-22 VITALS
SYSTOLIC BLOOD PRESSURE: 117 MMHG | RESPIRATION RATE: 18 BRPM | HEART RATE: 75 BPM | HEIGHT: 69 IN | TEMPERATURE: 97.8 F | DIASTOLIC BLOOD PRESSURE: 77 MMHG | BODY MASS INDEX: 28.06 KG/M2 | OXYGEN SATURATION: 95 %

## 2017-08-22 DIAGNOSIS — R51.9 NONINTRACTABLE EPISODIC HEADACHE, UNSPECIFIED HEADACHE TYPE: ICD-10-CM

## 2017-08-22 DIAGNOSIS — M54.81 OCCIPITAL NEURALGIA OF RIGHT SIDE: ICD-10-CM

## 2017-08-22 DIAGNOSIS — M54.2 NECK PAIN: ICD-10-CM

## 2017-08-22 DIAGNOSIS — R51.9 ACUTE NONINTRACTABLE HEADACHE, UNSPECIFIED HEADACHE TYPE: ICD-10-CM

## 2017-08-22 PROCEDURE — 99284 EMERGENCY DEPT VISIT MOD MDM: CPT | Mod: 25

## 2017-08-22 PROCEDURE — 25000128 H RX IP 250 OP 636: Performed by: EMERGENCY MEDICINE

## 2017-08-22 PROCEDURE — 96361 HYDRATE IV INFUSION ADD-ON: CPT

## 2017-08-22 PROCEDURE — 96375 TX/PRO/DX INJ NEW DRUG ADDON: CPT

## 2017-08-22 PROCEDURE — 96374 THER/PROPH/DIAG INJ IV PUSH: CPT

## 2017-08-22 RX ORDER — METOCLOPRAMIDE HYDROCHLORIDE 5 MG/ML
10 INJECTION INTRAMUSCULAR; INTRAVENOUS ONCE
Status: COMPLETED | OUTPATIENT
Start: 2017-08-22 | End: 2017-08-22

## 2017-08-22 RX ORDER — DIPHENHYDRAMINE HYDROCHLORIDE 50 MG/ML
50 INJECTION INTRAMUSCULAR; INTRAVENOUS ONCE
Status: COMPLETED | OUTPATIENT
Start: 2017-08-22 | End: 2017-08-22

## 2017-08-22 RX ORDER — DIAZEPAM 5 MG
5 TABLET ORAL EVERY 6 HOURS PRN
Qty: 2 TABLET | Refills: 0 | Status: SHIPPED | OUTPATIENT
Start: 2017-08-22 | End: 2018-06-05

## 2017-08-22 RX ORDER — DIPHENHYDRAMINE HYDROCHLORIDE 50 MG/ML
25 INJECTION INTRAMUSCULAR; INTRAVENOUS ONCE
Status: COMPLETED | OUTPATIENT
Start: 2017-08-22 | End: 2017-08-22

## 2017-08-22 RX ORDER — KETOROLAC TROMETHAMINE 30 MG/ML
30 INJECTION, SOLUTION INTRAMUSCULAR; INTRAVENOUS ONCE
Status: COMPLETED | OUTPATIENT
Start: 2017-08-22 | End: 2017-08-22

## 2017-08-22 RX ORDER — SODIUM CHLORIDE 9 MG/ML
1000 INJECTION, SOLUTION INTRAVENOUS CONTINUOUS
Status: DISCONTINUED | OUTPATIENT
Start: 2017-08-22 | End: 2017-08-22 | Stop reason: HOSPADM

## 2017-08-22 RX ORDER — METHYLPREDNISOLONE SODIUM SUCCINATE 125 MG/2ML
250 INJECTION, POWDER, LYOPHILIZED, FOR SOLUTION INTRAMUSCULAR; INTRAVENOUS ONCE
Status: COMPLETED | OUTPATIENT
Start: 2017-08-22 | End: 2017-08-22

## 2017-08-22 RX ORDER — KETOROLAC TROMETHAMINE 15 MG/ML
15 INJECTION, SOLUTION INTRAMUSCULAR; INTRAVENOUS ONCE
Status: COMPLETED | OUTPATIENT
Start: 2017-08-22 | End: 2017-08-22

## 2017-08-22 RX ADMIN — METOCLOPRAMIDE 10 MG: 5 INJECTION, SOLUTION INTRAMUSCULAR; INTRAVENOUS at 15:59

## 2017-08-22 RX ADMIN — SODIUM CHLORIDE 1000 ML: 9 INJECTION, SOLUTION INTRAVENOUS at 02:26

## 2017-08-22 RX ADMIN — METOCLOPRAMIDE 10 MG: 5 INJECTION, SOLUTION INTRAMUSCULAR; INTRAVENOUS at 02:23

## 2017-08-22 RX ADMIN — DIPHENHYDRAMINE HYDROCHLORIDE 50 MG: 50 INJECTION, SOLUTION INTRAMUSCULAR; INTRAVENOUS at 15:56

## 2017-08-22 RX ADMIN — DIPHENHYDRAMINE HYDROCHLORIDE 25 MG: 50 INJECTION, SOLUTION INTRAMUSCULAR; INTRAVENOUS at 02:21

## 2017-08-22 RX ADMIN — KETOROLAC TROMETHAMINE 15 MG: 15 INJECTION, SOLUTION INTRAMUSCULAR; INTRAVENOUS at 02:22

## 2017-08-22 RX ADMIN — METHYLPREDNISOLONE SODIUM SUCCINATE 250 MG: 125 INJECTION, POWDER, FOR SOLUTION INTRAMUSCULAR; INTRAVENOUS at 17:26

## 2017-08-22 RX ADMIN — KETOROLAC TROMETHAMINE 30 MG: 30 INJECTION, SOLUTION INTRAMUSCULAR; INTRAVENOUS at 15:58

## 2017-08-22 RX ADMIN — SODIUM CHLORIDE 1000 ML: 9 INJECTION, SOLUTION INTRAVENOUS at 16:02

## 2017-08-22 ASSESSMENT — ENCOUNTER SYMPTOMS
NECK PAIN: 0
PHOTOPHOBIA: 1
FEVER: 0
VOMITING: 0
PHOTOPHOBIA: 0
HEADACHES: 1
HEADACHES: 1
NECK STIFFNESS: 0
EYE PAIN: 1

## 2017-08-22 NOTE — ED NOTES
Right sided head pain-- patient states woke up with the pain in the morning and it is worse tonight. ABC intact alert and no distress.

## 2017-08-22 NOTE — ED AVS SNAPSHOT
[facility]    [address]    [facility] [place] [address]    Phone:  [phone]    Fax:  [phone]                                       Kathi Linn   MRN: [id number]    Department:  [facility] Emergency Department   Date of Visit:  8/22/2017           Patient Information     Date Of Birth          1975        Your diagnoses for this visit were:     Acute nonintractable headache, unspecified headache type     Occipital neuralgia of right side        You were seen by Jd Ervin MD.        Discharge Instructions       Please make an appointment to follow up with your primary care provider in 2-3 days if not improving.    Discharge Instructions  Headache    You were seen today for a headache. Headaches may be caused by many different things such as muscle tension, sinus inflammation, anxiety and stress, having too little sleep, too much alcohol, some medical conditions or injury. You may have a migraine, which is caused by changes in the blood vessels in your head.  At this time your provider does not find that your headache is a sign of anything dangerous or life-threatening.  However, sometimes the signs of serious illness do not show up right away.      Generally, every Emergency Department visit should have a follow-up clinic visit with either a primary or a specialty clinic/provider. Please follow-up as instructed by your emergency provider today.    Return to the Emergency Department if:    You get a new fever of 100.4 F or higher.    Your headache gets much worse.    You get a stiff neck with your headache.    You get a new headache that is significantly different or worse than headaches you have had before.    You are vomiting (throwing up) and cannot keep food or water down.    You have blurry or double vision or other problems with your eyes.    You have a new weakness on one side of your body.    You have difficulty with  balance which is new.    You or your family thinks you are confused.    You have a seizure.    What can I do to help myself?    Pain medications - You may take a pain medication such as Tylenol  (acetaminophen), Advil , Motrin  (ibuprofen) or Aleve  (naproxen).    Take a pain reliever as soon as you notice symptoms.  Starting medications as soon as you start to have symptoms may lessen the amount of pain you have.    Relaxing in a quiet, dark room may help.    Get enough sleep and eat meals regularly.    You may need to watch for certain foods or other things which may trigger your headaches.  Keeping a journal of your headaches and possible triggers may help you and your primary provider to identify things which you should avoid which may be causing your headaches.  If you were given a prescription for medicine here today, be sure to read all of the information (including the package insert) that comes with your prescription.  This will include important information about the medicine, its side effects, and any warnings that you need to know about.  The pharmacist who fills the prescription can provide more information and answer questions you may have about the medicine.  If you have questions or concerns that the pharmacist cannot address, please call or return to the Emergency Department.   Remember that you can always come back to the Emergency Department if you are not able to see your regular provider in the amount of time listed above, if you get any new symptoms, or if there is anything that worries you.        24 Hour Appointment Hotline       To make an appointment at any [facility], call [phone] ([phone]). If you don't have a family doctor or clinic, we will help you find one. [facility] are conveniently located to serve the needs of you and your family.             Review of your medicines      Our records show that you are taking the medicines listed below. If these are  incorrect, please call your family doctor or clinic.        Dose / Directions Last dose taken    albuterol 108 (90 BASE) MCG/ACT Inhaler   Commonly known as:  PROAIR HFA/PROVENTIL HFA/VENTOLIN HFA   Dose:  1-2 puff   Quantity:  1 Inhaler        Inhale 1-2 puffs into the lungs every 4 hours as needed for shortness of breath / dyspnea or wheezing   Refills:  0        BIOTIN   Dose:  1 tablet        1 tablet daily   Refills:  0        calcium carbonate 1250 MG tablet   Commonly known as:  OS-PIETER 500 mg [place]. Ca        Refills:  0        cholecalciferol 5000 UNITS Caps capsule   Commonly known as:  vitamin D3   Dose:  5000 Units        Take 5,000 Units by mouth daily   Refills:  0        ferrous gluconate 324 (38 FE) MG tablet   Commonly known as:  FERGON   Dose:  324 mg   Quantity:  30 tablet        Take 1 tablet (324 mg) by mouth 2 times daily   Refills:  0        gabapentin 600 MG tablet   Commonly known as:  NEURONTIN   Dose:  600 mg        Take 600 mg by mouth daily   Refills:  6        ibuprofen 200 MG tablet   Commonly known as:  ADVIL/MOTRIN   Dose:  200-400 mg   Quantity:  100 tablet        Take 1-2 tablets (200-400 mg) by mouth every 6 hours as needed for other (mild pain)   Refills:  0        MAGNESIA PO   Dose:  1 tablet        Take 1 tablet by mouth daily   Refills:  0        MULTIVITAMIN TABS   OR        Refills:  0        naproxen sodium 550 MG tablet   Commonly known as:  ANAPROX        Refills:  3        oxyCODONE-acetaminophen 5-325 MG per tablet   Commonly known as:  PERCOCET   Dose:  1-2 tablet   Quantity:  20 tablet        Take 1-2 tablets by mouth every 6 hours as needed for moderate to severe pain   Refills:  0        RIZATRIPTAN BENZOATE PO        Refills:  0        saccharomyces boulardii 250 MG capsule   Commonly known as:  FLORASTOR   Dose:  250 mg        Take 250 mg by mouth 2 times daily   Refills:  0        tiZANidine 4 MG tablet   Commonly known as:  ZANAFLEX        Refills:  0         VITAMIN A EX        5000 1 tab qd   Refills:  0        VITAMIN E   Dose:  1 tablet        1 tablet daily.   Refills:  0        VITAMIN K PO   Dose:  1 tablet        Take 1 tablet by mouth daily.   Refills:  0        ZINC ACETATE PO        Refills:  0                Orders Needing Specimen Collection     None      Pending Results     No orders found from 8/20/2017 to 8/23/2017.            Pending Culture Results     No orders found from 8/20/2017 to 8/23/2017.            Pending Results Instructions     If you had any lab results that were not finalized at the time of your Discharge, you can call the ED Lab Result RN at [phone]. You will be contacted by this team for any positive Lab results or changes in treatment. The nurses are available 7 days a week from 10A to 6:30P.  You can leave a message 24 hours per day and they will return your call.        Test Results From Your Hospital Stay               Clinical Quality Measure: Blood Pressure Screening     Your blood pressure was checked while you were in the emergency department today. The last reading we obtained was  BP: 130/89 . Please read the guidelines below about what these numbers mean and what you should do about them.  If your systolic blood pressure (the top number) is less than 120 and your diastolic blood pressure (the bottom number) is less than 80, then your blood pressure is normal. There is nothing more that you need to do about it.  If your systolic blood pressure (the top number) is 120-139 or your diastolic blood pressure (the bottom number) is 80-89, your blood pressure may be higher than it should be. You should have your blood pressure rechecked within a year by a primary care provider.  If your systolic blood pressure (the top number) is 140 or greater or your diastolic blood pressure (the bottom number) is 90 or greater, you may have high blood pressure. High blood pressure is treatable, but if left untreated over time it can put you at  risk for heart attack, stroke, or kidney failure. You should have your blood pressure rechecked by a primary care provider within the next 4 weeks.  If your provider in the emergency department today gave you specific instructions to follow-up with your doctor or provider even sooner than that, you should follow that instruction and not wait for up to 4 weeks for your follow-up visit.        Thank you for choosing [facility]       Thank you for choosing [facility] for your care. Our goal is always to provide you with excellent care. Hearing back from our patients is one way we can continue to improve our services. Please take a few minutes to complete the written survey that you may receive in the mail after you visit with us. Thank you!        [facility] Information     [email or web address] gives you secure access to your electronic health record. If you see a primary care provider, you can also send messages to your care team and make appointments. If you have questions, please call your primary care clinic.  If you do not have a primary care provider, please call [phone] and they will assist you.        Care EveryWhere ID     This is your Care EveryWhere ID. This could be used by other organizations to access your [facility] medical records  [id number]        Equal Access to Services     GLORIA LUNA : Charlene Devi, katie harrison, amber florence . So [facility] [phone].    ATENCIÓN: Si habla español, tiene a ventura disposición servicios gratuitos de asistencia lingüística. Yasmeename al [phone].    We comply with applicable federal civil rights laws and [place] laws. We do not discriminate on the basis of race, color, national origin, age, disability sex, sexual orientation or gender identity.            After Visit Summary       This is your record. Keep this with you and show to your community pharmacist(s) and doctor(s) at your next visit.

## 2017-08-22 NOTE — ED PROVIDER NOTES
History     Chief Complaint:  Headache     HPI   Kathi Linn is a 42 year old female who presents to the emergency department today for evaluation of a headache. Per chart review, the patient was seen in the emergency department overnight with similar presentation at which time no imaging was obtained, the patient was given Benadryl, Toradol, and Reglan and discharged home. The patient reports she has occipital neuralgia for which she is followed by Dr. Madison of neurology and takes 600 mg gabapentin, naproxen, and tizanidine. She has an appointment for neurology tomorrow morning. She reports previously having a nerve block which did not help her headaches. She states her current symptoms are typical of her neuralgia headaches. She denies any vision changes or loss of vision, fevers, neck stiffness, abdominal pain, possibility of pregnancy, and dental pain. The patient has no other concerns at this time.      Allergies:  Corticosteroids      Medications:    RIZATRIPTAN BENZOATE PO  Venlafaxine HCl (EFFEXOR PO)  gabapentin (NEURONTIN) 600 MG tablet  tiZANidine (ZANAFLEX) 4 MG tablet  oxyCODONE-acetaminophen (PERCOCET) 5-325 MG per tablet  albuterol (PROAIR HFA/PROVENTIL HFA/VENTOLIN HFA) 108 (90 BASE) MCG/ACT Inhaler  naproxen sodium (ANAPROX) 550 MG tablet  ferrous gluconate (FERGON) 324 (38 FE) MG tablet  ibuprofen (ADVIL,MOTRIN) 200 MG tablet  cholecalciferol (VITAMIN D3) 5000 UNITS CAPS capsule  CALCIUM 500 MG OR TABS    Past Medical History:    Anemia   Gastro-oesophageal reflux disease   Hx of gastric ulcer   Irritable bowel syndrome   Lumbago   Mild intermittent asthma  Obesity, unspecified   Pneumonia    Past Surgical History:    Abdomen surgery   section  Cholecystectomy   Herniorrhaphy ventral   [place] teeth removal     Family History:    Lymphoma   Osteoporosis   Hypertension   Lyme's disease     Social History:  The patient was accompanied to the ED by her .  Smoking Status: Former  "smoker   Smokeless Tobacco: Never used   Alcohol Use: Yes    Marital Status:        Review of Systems   Constitutional: Negative for fever.   Eyes: Negative for photophobia and visual disturbance.   Musculoskeletal: Negative for neck pain and neck stiffness.   Neurological: Positive for headaches.   All other systems reviewed and are negative.    Physical Exam     Patient Vitals for the past 24 hrs:   BP Temp Temp src Pulse Resp SpO2 Height   08/22/17 1459 (!) 142/92 97.8  F (36.6  C) Oral 75 18 99 % 1.753 m (5' 9\")      Physical Exam  General: Uncomfortable adult female sitting upright.   Eyes: PERRL, Conjunctive within normal limits. EOMI.   HENT: Moist mucous membranes, oropharynx clear. Tender over the right parietal scalp into the right occipital region without palpable edema or crepitance.   Neck: No rigidity. Tenderness in right paraspinal musculature. No bony tenderness.   CV: Normal S1S2, no murmur, rub or gallop. Regular rate and rhythm  Resp: Clear to auscultation bilaterally, no wheezes, rales or rhonchi. Normal respiratory effort.  GI: Abdomen is soft, nontender and nondistended. No palpable masses. No rebound or guarding.  MSK: No edema. Nontender. Normal active range of motion.  Skin: Warm and dry. No rashes or lesions or ecchymoses on visible skin. No visible scalp abnormality.   Neuro: Alert and oriented. Responds appropriately to all questions and commands. No focal findings appreciated. Normal muscle tone. 5/5 finger abduction thumb up position and wrist extension.   Psych: Normal mood and affect. Pleasant.   Emergency Department Course     Interventions:  1602 NS Bolus 1,000mL IV   1556 Benadryl 50 mg IV   1558 Toradol 30 mg IV   1559 Reglan 10 mg IV   72328 Solu-Medrol 250 mg IV      Emergency Department Course:  Nursing notes and vitals reviewed.  1534 I performed an exam of the patient as documented above.   1633 I spoke with the patient's neurologist Dr. Madison about the patient's " presentation, findings, and plan of care.   Patient reassessed. She is feeling much better. Denies any new concerns.  I discussed the treatment plan with the patient. They expressed understanding of this plan and consented to discharge. They will be discharged home with instructions for care and follow up. In addition, the patient will return to the emergency department if their symptoms persist, worsen, if new symptoms arise or if there is any concern.  All questions were answered.   Impression & Plan      Medical Decision Making:  Kathi Linn is a 42 year old female with history of occipital neuralgia headaches probable some tension headache aspects who presents to the emergency department with her same type of pain that had resolved on previous evaluation within the last 24 hours but have come back. Here she has no focal neurologic deficit. I do not think the trauma she sustained which was without head injury or loss of consciousness is reflective of any intracranial process that would require brain imaging. There is no indication for emergent advanced neuroimaging. She had improvement in symptoms here. I spoke with Dr. Madison her neurologist who recommended 250 mg dose of IV solu-medrol. She has follow up tomorrow with his clinic. She can return at any time to the emergency department if her symptoms are worsening. All questions answered prior to discharge.     Diagnosis:    ICD-10-CM    1. Nonintractable episodic headache, unspecified headache type R51    2. Neck pain M54.2      Disposition:  Discharged to home with the below prescription.     Discharge Medications:  New Prescriptions    DIAZEPAM (VALIUM) 5 MG TABLET    Take 1 tablet (5 mg) by mouth every 6 hours as needed for muscle spasms or pain       Scribe Disclosure:  I, Kyle Gutierrez, am serving as a scribe at 3:25 PM on 8/22/2017 to document services personally performed by Tabitha Guillen MD based on my observations and the provider's statements  to me.    Kyle Gutierrez  8/22/2017   [facility] EMERGENCY DEPARTMENT       Tabitha Guillen MD  08/26/17 0229
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
## 2017-08-22 NOTE — ED AVS SNAPSHOT
[facility] Emergency Department    [address]    [facility] [address]    Phone:  [phone]    Fax:  [phone]                                       Kathi Linn   MRN: [id number]    Department:  [facility] Emergency Department   Date of Visit:  8/22/2017           Patient Information     Date Of Birth          1975        Your diagnoses for this visit were:     Nonintractable episodic headache, unspecified headache type     Neck pain        You were seen by Tabitha Guillen MD.      Follow-up Information     Follow up with Dr. Madison (neurology clinic).    Why:  tomorrow as scheduled        Follow up with [facility] Emergency Department.    Specialty:  EMERGENCY MEDICINE    Why:  As needed, If symptoms worsen    Contact information:    [address]  [facility] [address]  [phone]        Discharge Instructions       Discharge Instructions  Headache    You were seen today for a headache. Headaches may be caused by many different things such as muscle tension, sinus inflammation, anxiety and stress, having too little sleep, too much alcohol, some medical conditions or injury. You may have a migraine, which is caused by changes in the blood vessels in your head.  At this time your provider does not find that your headache is a sign of anything dangerous or life-threatening.  However, sometimes the signs of serious illness do not show up right away.      Generally, every Emergency Department visit should have a follow-up clinic visit with either a primary or a specialty clinic/provider. Please follow-up as instructed by your emergency provider today.    Return to the Emergency Department if:    You get a new fever of 100.4 F or higher.    Your headache gets much worse.    You get a stiff neck with your headache.    You get a new headache that is significantly different or worse than headaches you have had before.    You  are vomiting (throwing up) and cannot keep food or water down.    You have blurry or double vision or other problems with your eyes.    You have a new weakness on one side of your body.    You have difficulty with balance which is new.    You or your family thinks you are confused.    You have a seizure.    What can I do to help myself?    Pain medications - You may take a pain medication such as Tylenol  (acetaminophen), Advil , Motrin  (ibuprofen) or Aleve  (naproxen).    Take a pain reliever as soon as you notice symptoms.  Starting medications as soon as you start to have symptoms may lessen the amount of pain you have.    Relaxing in a quiet, dark room may help.    Get enough sleep and eat meals regularly.    You may need to watch for certain foods or other things which may trigger your headaches.  Keeping a journal of your headaches and possible triggers may help you and your primary provider to identify things which you should avoid which may be causing your headaches.  If you were given a prescription for medicine here today, be sure to read all of the information (including the package insert) that comes with your prescription.  This will include important information about the medicine, its side effects, and any warnings that you need to know about.  The pharmacist who fills the prescription can provide more information and answer questions you may have about the medicine.  If you have questions or concerns that the pharmacist cannot address, please call or return to the Emergency Department.   Remember that you can always come back to the Emergency Department if you are not able to see your regular provider in the amount of time listed above, if you get any new symptoms, or if there is anything that worries you.      24 Hour Appointment Hotline       To make an appointment at any [facility], call [phone] ([phone]). If you don't have a family doctor or clinic, we will help you find one.  [facility] are conveniently located to serve the needs of you and your family.             Review of your medicines      START taking        Dose / Directions Last dose taken    diazepam 5 MG tablet   Commonly known as:  VALIUM   Dose:  5 mg   Quantity:  2 tablet        Take 1 tablet (5 mg) by mouth every 6 hours as needed for muscle spasms or pain   Refills:  0          Our records show that you are taking the medicines listed below. If these are incorrect, please call your family doctor or clinic.        Dose / Directions Last dose taken    albuterol 108 (90 BASE) MCG/ACT Inhaler   Commonly known as:  PROAIR HFA/PROVENTIL HFA/VENTOLIN HFA   Dose:  1-2 puff   Quantity:  1 Inhaler        Inhale 1-2 puffs into the lungs every 4 hours as needed for shortness of breath / dyspnea or wheezing   Refills:  0        BIOTIN   Dose:  1 tablet        1 tablet daily   Refills:  0        calcium carbonate 1250 MG tablet   Commonly known as:  OS-PIETER 500 mg [place]. [place]        Refills:  0        cholecalciferol 5000 UNITS Caps capsule   Commonly known as:  vitamin D3   Dose:  5000 Units        Take 5,000 Units by mouth daily   Refills:  0        EFFEXOR PO        Take by mouth 3 times daily   Refills:  0        ferrous gluconate 324 (38 FE) MG tablet   Commonly known as:  FERGON   Dose:  324 mg   Quantity:  30 tablet        Take 1 tablet (324 mg) by mouth 2 times daily   Refills:  0        gabapentin 600 MG tablet   Commonly known as:  NEURONTIN   Dose:  600 mg        Take 600 mg by mouth daily   Refills:  6        ibuprofen 200 MG tablet   Commonly known as:  ADVIL/MOTRIN   Dose:  200-400 mg   Quantity:  100 tablet        Take 1-2 tablets (200-400 mg) by mouth every 6 hours as needed for other (mild pain)   Refills:  0        MAGNESIA PO   Dose:  1 tablet        Take 1 tablet by mouth daily   Refills:  0        MULTIVITAMIN TABS   OR        Refills:  0        naproxen sodium 550 MG tablet   Commonly known as:  ANAPROX         Refills:  3        oxyCODONE-acetaminophen 5-325 MG per tablet   Commonly known as:  PERCOCET   Dose:  1-2 tablet   Quantity:  20 tablet        Take 1-2 tablets by mouth every 6 hours as needed for moderate to severe pain   Refills:  0        RIZATRIPTAN BENZOATE PO        Refills:  0        saccharomyces boulardii 250 MG capsule   Commonly known as:  FLORASTOR   Dose:  250 mg        Take 250 mg by mouth 2 times daily   Refills:  0        tiZANidine 4 MG tablet   Commonly known as:  ZANAFLEX        Refills:  0        VITAMIN A EX        5000 1 tab qd   Refills:  0        VITAMIN E   Dose:  1 tablet        1 tablet daily.   Refills:  0        VITAMIN K PO   Dose:  1 tablet        Take 1 tablet by mouth daily.   Refills:  0        ZINC ACETATE PO        Refills:  0                Prescriptions were sent or printed at these locations (1 Prescription)                   Other Prescriptions                Printed at Department/Unit printer (1 of 1)         diazepam (VALIUM) 5 MG tablet                Procedures and tests performed during your visit     Pulse oximetry nursing      Orders Needing Specimen Collection     None      Pending Results     No orders found from 8/20/2017 to 8/23/2017.            Pending Culture Results     No orders found from 8/20/2017 to 8/23/2017.            Pending Results Instructions     If you had any lab results that were not finalized at the time of your Discharge, you can call the ED Lab Result RN at [phone]. You will be contacted by this team for any positive Lab results or changes in treatment. The nurses are available 7 days a week from 10A to 6:30P.  You can leave a message 24 hours per day and they will return your call.        Test Results From Your Hospital Stay               Clinical Quality Measure: Blood Pressure Screening     Your blood pressure was checked while you were in the emergency department today. The last reading we obtained was  BP: (!) 142/92 . Please read the  guidelines below about what these numbers mean and what you should do about them.  If your systolic blood pressure (the top number) is less than 120 and your diastolic blood pressure (the bottom number) is less than 80, then your blood pressure is normal. There is nothing more that you need to do about it.  If your systolic blood pressure (the top number) is 120-139 or your diastolic blood pressure (the bottom number) is 80-89, your blood pressure may be higher than it should be. You should have your blood pressure rechecked within a year by a primary care provider.  If your systolic blood pressure (the top number) is 140 or greater or your diastolic blood pressure (the bottom number) is 90 or greater, you may have high blood pressure. High blood pressure is treatable, but if left untreated over time it can put you at risk for heart attack, stroke, or kidney failure. You should have your blood pressure rechecked by a primary care provider within the next 4 weeks.  If your provider in the emergency department today gave you specific instructions to follow-up with your doctor or provider even sooner than that, you should follow that instruction and not wait for up to 4 weeks for your follow-up visit.        Thank you for choosing [facility]       Thank you for choosing [facility] for your care. Our goal is always to provide you with excellent care. Hearing back from our patients is one way we can continue to improve our services. Please take a few minutes to complete the written survey that you may receive in the mail after you visit with us. Thank you!        [employer] Information     [employer] gives you secure access to your electronic health record. If you see a primary care provider, you can also send messages to your care team and make appointments. If you have questions, please call your primary care clinic.  If you do not have a primary care provider, please call [phone] and they will assist you.        Care  EveryWhere ID     This is your Care EveryWhere ID. This could be used by other organizations to access your [place] medical records  [id number]        Equal Access to Services     GLORIA LUNA : Charlene Devi, katie harrison, cristiane shaikh, amber corcoran. So [facility] [phone].    ATENCIÓN: Si habla español, tiene a ventura disposición servicios gratuitos de asistencia lingüística. Llame al [phone].    We comply with applicable federal civil rights laws and [place] laws. We do not discriminate on the basis of race, color, national origin, age, disability sex, sexual orientation or gender identity.            After Visit Summary       This is your record. Keep this with you and show to your community pharmacist(s) and doctor(s) at your next visit.

## 2017-08-22 NOTE — ED NOTES
Here with right sided headache . Was here about 3 hours during the night and went home but says she did not get much relief started of Effexor which she had one dose Now here again for headache relief. Unable to touch the side of her head because of the pain. Photophobic vomiting

## 2017-08-22 NOTE — DISCHARGE INSTRUCTIONS
Discharge Instructions  Headache    You were seen today for a headache. Headaches may be caused by many different things such as muscle tension, sinus inflammation, anxiety and stress, having too little sleep, too much alcohol, some medical conditions or injury. You may have a migraine, which is caused by changes in the blood vessels in your head.  At this time your provider does not find that your headache is a sign of anything dangerous or life-threatening.  However, sometimes the signs of serious illness do not show up right away.      Generally, every Emergency Department visit should have a follow-up clinic visit with either a primary or a specialty clinic/provider. Please follow-up as instructed by your emergency provider today.    Return to the Emergency Department if:    You get a new fever of 100.4 F or higher.    Your headache gets much worse.    You get a stiff neck with your headache.    You get a new headache that is significantly different or worse than headaches you have had before.    You are vomiting (throwing up) and cannot keep food or water down.    You have blurry or double vision or other problems with your eyes.    You have a new weakness on one side of your body.    You have difficulty with balance which is new.    You or your family thinks you are confused.    You have a seizure.    What can I do to help myself?    Pain medications - You may take a pain medication such as Tylenol  (acetaminophen), Advil , Motrin  (ibuprofen) or Aleve  (naproxen).    Take a pain reliever as soon as you notice symptoms.  Starting medications as soon as you start to have symptoms may lessen the amount of pain you have.    Relaxing in a quiet, dark room may help.    Get enough sleep and eat meals regularly.    You may need to watch for certain foods or other things which may trigger your headaches.  Keeping a journal of your headaches and possible triggers may help you and your primary provider to identify  things which you should avoid which may be causing your headaches.  If you were given a prescription for medicine here today, be sure to read all of the information (including the package insert) that comes with your prescription.  This will include important information about the medicine, its side effects, and any warnings that you need to know about.  The pharmacist who fills the prescription can provide more information and answer questions you may have about the medicine.  If you have questions or concerns that the pharmacist cannot address, please call or return to the Emergency Department.   Remember that you can always come back to the Emergency Department if you are not able to see your regular provider in the amount of time listed above, if you get any new symptoms, or if there is anything that worries you.

## 2017-08-22 NOTE — DISCHARGE INSTRUCTIONS
Please make an appointment to follow up with your primary care provider in 2-3 days if not improving.    Discharge Instructions  Headache    You were seen today for a headache. Headaches may be caused by many different things such as muscle tension, sinus inflammation, anxiety and stress, having too little sleep, too much alcohol, some medical conditions or injury. You may have a migraine, which is caused by changes in the blood vessels in your head.  At this time your provider does not find that your headache is a sign of anything dangerous or life-threatening.  However, sometimes the signs of serious illness do not show up right away.      Generally, every Emergency Department visit should have a follow-up clinic visit with either a primary or a specialty clinic/provider. Please follow-up as instructed by your emergency provider today.    Return to the Emergency Department if:    You get a new fever of 100.4 F or higher.    Your headache gets much worse.    You get a stiff neck with your headache.    You get a new headache that is significantly different or worse than headaches you have had before.    You are vomiting (throwing up) and cannot keep food or water down.    You have blurry or double vision or other problems with your eyes.    You have a new weakness on one side of your body.    You have difficulty with balance which is new.    You or your family thinks you are confused.    You have a seizure.    What can I do to help myself?    Pain medications - You may take a pain medication such as Tylenol  (acetaminophen), Advil , Motrin  (ibuprofen) or Aleve  (naproxen).    Take a pain reliever as soon as you notice symptoms.  Starting medications as soon as you start to have symptoms may lessen the amount of pain you have.    Relaxing in a quiet, dark room may help.    Get enough sleep and eat meals regularly.    You may need to watch for certain foods or other things which may trigger your headaches.  Keeping a  journal of your headaches and possible triggers may help you and your primary provider to identify things which you should avoid which may be causing your headaches.  If you were given a prescription for medicine here today, be sure to read all of the information (including the package insert) that comes with your prescription.  This will include important information about the medicine, its side effects, and any warnings that you need to know about.  The pharmacist who fills the prescription can provide more information and answer questions you may have about the medicine.  If you have questions or concerns that the pharmacist cannot address, please call or return to the Emergency Department.   Remember that you can always come back to the Emergency Department if you are not able to see your regular provider in the amount of time listed above, if you get any new symptoms, or if there is anything that worries you.

## 2017-08-22 NOTE — ED AVS SNAPSHOT
[facility] Emergency Department    [address]    [facility] [address]    Phone:  [phone]    Fax:  [phone]                                       Kathi Linn   MRN: [id number]    Department:  [facility] Emergency Department   Date of Visit:  8/22/2017           After Visit Summary Signature Page     I have received my discharge instructions, and my questions have been answered. I have discussed any challenges I see with this plan with the nurse or doctor.    ..........................................................................................................................................  Patient/Patient Representative Signature      ..........................................................................................................................................  Patient Representative Print Name and Relationship to Patient    ..................................................               ................................................  Date                                            Time    ..........................................................................................................................................  Reviewed by Signature/Title    ...................................................              ..............................................  Date                                                            Time

## 2017-08-22 NOTE — ED AVS SNAPSHOT
[facility] Emergency Department    [address]    [facility] [address]    Phone:  [phone]    Fax:  [phone]                                       Kathi Linn   MRN: [id number]    Department:  [facility] Emergency Department   Date of Visit:  8/22/2017           After Visit Summary Signature Page     I have received my discharge instructions, and my questions have been answered. I have discussed any challenges I see with this plan with the nurse or doctor.    ..........................................................................................................................................  Patient/Patient Representative Signature      ..........................................................................................................................................  Patient Representative Print Name and Relationship to Patient    ..................................................               ................................................  Date                                            Time    ..........................................................................................................................................  Reviewed by Signature/Title    ...................................................              ..............................................  Date                                                            Time

## 2017-08-23 ENCOUNTER — TRANSFERRED RECORDS (OUTPATIENT)
Dept: HEALTH INFORMATION MANAGEMENT | Facility: CLINIC | Age: 42
End: 2017-08-23

## 2017-09-08 ENCOUNTER — TELEPHONE (OUTPATIENT)
Dept: FAMILY MEDICINE | Facility: CLINIC | Age: 42
End: 2017-09-08

## 2017-09-08 ENCOUNTER — MYC MEDICAL ADVICE (OUTPATIENT)
Dept: FAMILY MEDICINE | Facility: CLINIC | Age: 42
End: 2017-09-08

## 2017-09-08 DIAGNOSIS — D50.9 ANEMIA, IRON DEFICIENCY: Primary | ICD-10-CM

## 2017-09-08 NOTE — TELEPHONE ENCOUNTER
Panel Management Review      Patient has the following on her problem list:     Asthma review     ACT Total Scores 9/9/2016   ACT TOTAL SCORE -   ASTHMA ER VISITS -   ASTHMA HOSPITALIZATIONS -   ACT TOTAL SCORE (Goal Greater than or Equal to 20) 25   In the past 12 months, how many times did you visit the emergency room for your asthma without being admitted to the hospital? 0   In the past 12 months, how many times were you hospitalized overnight because of your asthma? 0      1. Is Asthma diagnosis on the Problem List? Yes    2. Is Asthma listed on Health Maintenance? Yes    3. Patient is due for:  ACT        Composite cancer screening  Chart review shows that this patient is due/due soon for the following Pap Smear  Summary:    Patient is due/failing the following:   ACT and PAP    Action needed:   Patient needs office visit for PAP and ACT.    Type of outreach:    Sent Music Messenger (MM) message.    Questions for provider review:    None                                                                                                                                    Arnie Phillips MA       Chart routed to Care Team .

## 2017-09-09 ENCOUNTER — HEALTH MAINTENANCE LETTER (OUTPATIENT)
Age: 42
End: 2017-09-09

## 2017-09-18 NOTE — TELEPHONE ENCOUNTER
Panel Management Review    2nd attempt to contact pt.    Patient has the following on her problem list:     Asthma review     ACT Total Scores 9/9/2016   ACT TOTAL SCORE -   ASTHMA ER VISITS -   ASTHMA HOSPITALIZATIONS -   ACT TOTAL SCORE (Goal Greater than or Equal to 20) 25   In the past 12 months, how many times did you visit the emergency room for your asthma without being admitted to the hospital? 0   In the past 12 months, how many times were you hospitalized overnight because of your asthma? 0      1. Is Asthma diagnosis on the Problem List? Yes    2. Is Asthma listed on Health Maintenance? Yes    3. Patient is due for:  ACT and AAP        Composite cancer screening  Chart review shows that this patient is due/due soon for the following Pap Smear  Summary:    Patient is due/failing the following:   AAP, ACT and PAP    Action needed:   Patient needs office visit for px with pap and AAP/ACT.    Type of outreach:    Phone, left message for patient to call back.     Questions for provider review:    None                                                                                                                                 Arnie Phillips MA     Chart routed to Care Team.

## 2017-09-25 ENCOUNTER — OFFICE VISIT (OUTPATIENT)
Dept: FAMILY MEDICINE | Facility: CLINIC | Age: 42
End: 2017-09-25
Payer: COMMERCIAL

## 2017-09-25 VITALS
DIASTOLIC BLOOD PRESSURE: 70 MMHG | RESPIRATION RATE: 18 BRPM | OXYGEN SATURATION: 99 % | BODY MASS INDEX: 28.41 KG/M2 | SYSTOLIC BLOOD PRESSURE: 112 MMHG | HEART RATE: 68 BPM | WEIGHT: 192.4 LBS | TEMPERATURE: 98.5 F

## 2017-09-25 DIAGNOSIS — D50.9 IRON DEFICIENCY ANEMIA, UNSPECIFIED IRON DEFICIENCY ANEMIA TYPE: ICD-10-CM

## 2017-09-25 DIAGNOSIS — Z12.39 SCREENING FOR BREAST CANCER: ICD-10-CM

## 2017-09-25 DIAGNOSIS — R68.84 JAW PAIN: Primary | ICD-10-CM

## 2017-09-25 DIAGNOSIS — R53.83 OTHER FATIGUE: ICD-10-CM

## 2017-09-25 DIAGNOSIS — J45.20 MILD INTERMITTENT ASTHMA WITHOUT COMPLICATION: ICD-10-CM

## 2017-09-25 LAB — HGB BLD-MCNC: 11.3 G/DL (ref 11.7–15.7)

## 2017-09-25 PROCEDURE — 99214 OFFICE O/P EST MOD 30 MIN: CPT | Performed by: PHYSICIAN ASSISTANT

## 2017-09-25 PROCEDURE — 85018 HEMOGLOBIN: CPT | Performed by: PHYSICIAN ASSISTANT

## 2017-09-25 PROCEDURE — 84443 ASSAY THYROID STIM HORMONE: CPT | Performed by: PHYSICIAN ASSISTANT

## 2017-09-25 PROCEDURE — 36415 COLL VENOUS BLD VENIPUNCTURE: CPT | Performed by: PHYSICIAN ASSISTANT

## 2017-09-25 PROCEDURE — 82728 ASSAY OF FERRITIN: CPT | Performed by: PHYSICIAN ASSISTANT

## 2017-09-25 RX ORDER — PROMETHAZINE HCL 50 MG
50 TABLET ORAL
Refills: 1 | COMMUNITY
Start: 2017-08-23 | End: 2018-06-05

## 2017-09-25 RX ORDER — VENLAFAXINE HYDROCHLORIDE 75 MG/1
75 CAPSULE, EXTENDED RELEASE ORAL
COMMUNITY
Start: 2017-09-16 | End: 2018-06-05

## 2017-09-25 RX ORDER — NAPROXEN 500 MG/1
TABLET ORAL
COMMUNITY
Start: 2017-06-27 | End: 2018-06-05

## 2017-09-25 NOTE — TELEPHONE ENCOUNTER
3rd attempt to contact pt, talked to pt and transferred to scheduling.  Pt notified me of intense jaw pain and swelling, wants to be seen for that problem instead.  Also wants hemoglobin and ferritin drawn.  Labs have been ordered.  Arnie Phillips MA

## 2017-09-25 NOTE — LETTER
My Asthma Action Plan  Name: Kathi Linn   YOB: 1975  Date: 9/25/2017   My doctor: Terra Issa PA-C   My clinic: [facility]        My Control Medicine: { :445374}  My Rescue Medicine: { :581141}  {AAP include Oral Steroid:604076} My Asthma Severity: { :199952}  Avoid your asthma triggers: { :485816}        {Is patient a child or adult?:242148}       GREEN ZONE   Good Control    I feel good    No cough or wheeze    Can work, sleep and play without asthma symptoms       Take your asthma control medicine every day.     1. If exercise triggers your asthma, take your rescue medication    15 minutes before exercise or sports, and    During exercise if you have asthma symptoms  2. Spacer to use with inhaler: If you have a spacer, make sure to use it with your inhaler             YELLOW ZONE Getting Worse  I have ANY of these:    I do not feel good    Cough or wheeze    Chest feels tight    Wake up at night   1. Keep taking your Green Zone medications  2. Start taking your rescue medicine:    every 20 minutes for up to 1 hour. Then every 4 hours for 24-48 hours.  3. If you stay in the Yellow Zone for more than 12-24 hours, contact your doctor.  4. If you do not return to the Green Zone in 12-24 hours or you get worse, start taking your oral steroid medicine if prescribed by your provider.           RED ZONE Medical Alert - Get Help  I have ANY of these:    I feel awful    Medicine is not helping    Breathing getting harder    Trouble walking or talking    Nose opens wide to breathe       1. Take your rescue medicine NOW  2. If your provider has prescribed an oral steroid medicine, start taking it NOW  3. Call your doctor NOW  4. If you are still in the Red Zone after 20 minutes and you have not reached your doctor:    Take your rescue medicine again and    Call 911 or go to the emergency room right away    See your regular doctor within 2 weeks of an Emergency Room or Urgent  Care visit for follow-up treatment.        Electronically signed by: Arnie Phillips, September 25, 2017    Annual Reminders:  Meet with Asthma Educator,  Flu Shot in the Fall, consider Pneumonia Vaccination for patients with asthma (aged 19 and older).    Pharmacy: [id number] [address]                    Asthma Triggers  How To Control Things That Make Your Asthma Worse    Triggers are things that make your asthma worse.  Look at the list below to help you find your triggers and what you can do about them.  You can help prevent asthma flare-ups by staying away from your triggers.      Trigger                                                          What you can do   Cigarette Smoke  Tobacco smoke can make asthma worse. Do not allow smoking in your home, car or around you.  Be sure no one smokes at a child s day care or school.  If you smoke, ask your health care provider for ways to help you quit.  Ask family members to quit too.  Ask your health care provider for a referral to Quit Plan to help you quit smoking, or call 5-800-554-PLAN.     Colds, Flu, Bronchitis  These are common triggers of asthma. Wash your hands often.  Don t touch your eyes, nose or mouth.  Get a flu shot every year.     Dust Mites  These are tiny bugs that live in cloth or carpet. They are too small to see. Wash sheets and blankets in hot water every week.   Encase pillows and mattress in dust mite proof covers.  Avoid having carpet if you can. If you have carpet, vacuum weekly.   Use a dust mask and HEPA vacuum.   Pollen and Outdoor Mold  Some people are allergic to trees, grass, or weed pollen, or molds. Try to keep your windows closed.  Limit time out doors when pollen count is high.   Ask you health care provider about taking medicine during allergy season.     Animal Dander  Some people are allergic to skin flakes, urine or saliva from pets with fur or feathers. Keep pets with fur or feathers out  of your home.    If you can t keep the pet outdoors, then keep the pet out of your bedroom.  Keep the bedroom door closed.  Keep pets off cloth furniture and away from stuffed toys.     Mice, Rats, and Cockroaches  Some people are allergic to the waste from these pests.   Cover food and garbage.  Clean up spills and food crumbs.  Store grease in the refrigerator.   Keep food out of the bedroom.   Indoor Mold  This can be a trigger if your home has high moisture. Fix leaking faucets, pipes, or other sources of water.   Clean moldy surfaces.  Dehumidify basement if it is damp and smelly.   Smoke, Strong Odors, and Sprays  These can reduce air quality. Stay away from strong odors and sprays, such as perfume, powder, hair spray, paints, smoke incense, paint, cleaning products, candles and new carpet.   Exercise or Sports  Some people with asthma have this trigger. Be active!  Ask your doctor about taking medicine before sports or exercise to prevent symptoms.    Warm up for 5-10 minutes before and after sports or exercise.     Other Triggers of Asthma  Cold air:  Cover your nose and mouth with a scarf.  Sometimes laughing or crying can be a trigger.  Some medicines and food can trigger asthma.

## 2017-09-25 NOTE — PROGRESS NOTES
SUBJECTIVE:   Kathi Linn is a 42 year old female who presents to clinic today for the following health issues:      Musculoskeletal problem/pain      Duration: x2-3 days    Description  Location: right jaw at the joint and cheek bone    Intensity:  mild    Accompanying signs and symptoms: swelling, mild headache, soreness    History  Previous similar problem: no   Previous evaluation:  none    Precipitating or alleviating factors:  Trauma or overuse: no   Aggravating factors include: pt has TMJ    Therapies tried and outcome: rx strength naproxen        Patient is here today for right jaw/cheek bone pain  Ongoing since the weekend, though improving  Pain is rated at 5/10  + swelling, + radiating headache  No fever or chills  No known tooth issue  Has been taking Naproxen with some relief    Patient is also concerned about fatigue  Really tired, low energy, needing to sleep more often  Feels like it could be her hx of iron deficiency, last transfusion 2 years ago    Problem list and histories reviewed & adjusted, as indicated.  Additional history: as documented    Patient Active Problem List   Diagnosis     Obesity     Mild intermittent asthma     Other specified disorder of skin     Irritable bowel syndrome     Bariatric surgery status     CARDIOVASCULAR SCREENING; LDL GOAL LESS THAN 160     Anemia, iron deficiency     Malabsorption     Hernia     Anemia     Past Surgical History:   Procedure Laterality Date     ABDOMEN SURGERY      DUODENAL SWITCH  2007     C/SECTION, CLASSICAL  1999, 2002     CHOLECYSTECTOMY       HERNIORRHAPHY VENTRAL  10/2/2013    Procedure: HERNIORRHAPHY VENTRAL;  Open Ventral hernia Repair with mesh;  Surgeon: Orly Dunlap MD;  Location: [facility] OR     WISDOM TEETH[         Social History   Substance Use Topics     Smoking status: Former Smoker     Packs/day: 0.10     Years: 2.00     Quit date: 1/1/1998     Smokeless tobacco: Never Used      Comment: QUIT in 1998     Alcohol use  0.0 oz/week     0 Standard drinks or equivalent per week      Comment: 3 DRINKS PER WEEK     Family History   Problem Relation Age of Onset     CANCER Father      NON-HODGKINS LYMPHOMA, DIABETIC ALSO     OSTEOPOROSIS Mother      Hypertension Mother      CEREBROVASCULAR DISEASE Mother      Depression Sister      LYMES DISEASE     Aneurysm Paternal Grandfather      age 41      CEREBROVASCULAR DISEASE Paternal Aunt      CEREBROVASCULAR DISEASE Maternal Grandmother      Melanoma Maternal Grandmother      CANCER Paternal Grandmother      lung     CANCER Maternal Grandfather      lung     CANCER Paternal Aunt 68     breast         Current Outpatient Prescriptions   Medication Sig Dispense Refill     naproxen (NAPROSYN) 500 MG tablet        venlafaxine (EFFEXOR-XR) 75 MG 24 hr capsule 75 mg       Promethazine HCl 50 MG TABS 50 mg  1     Venlafaxine HCl (EFFEXOR PO) Take by mouth 3 times daily       gabapentin (NEURONTIN) 600 MG tablet Take 600 mg by mouth daily  6     tiZANidine (ZANAFLEX) 4 MG tablet        saccharomyces boulardii (FLORASTOR) 250 MG capsule Take 250 mg by mouth 2 times daily       oxyCODONE-acetaminophen (PERCOCET) 5-325 MG per tablet Take 1-2 tablets by mouth every 6 hours as needed for moderate to severe pain 20 tablet 0     Zinc Acetate, Oral, (ZINC ACETATE PO)        naproxen sodium (ANAPROX) 550 MG tablet   3     ferrous gluconate (FERGON) 324 (38 FE) MG tablet Take 1 tablet (324 mg) by mouth 2 times daily 30 tablet 0     ibuprofen (ADVIL,MOTRIN) 200 MG tablet Take 1-2 tablets (200-400 mg) by mouth every 6 hours as needed for other (mild pain) 100 tablet 0     cholecalciferol (VITAMIN D3) 5000 UNITS CAPS capsule Take 5,000 Units by mouth daily       Magnesium Hydroxide (MAGNESIA PO) Take 1 tablet by mouth daily        BIOTIN 1 tablet daily        VITAMIN E 1 tablet daily.       VITAMIN K PO Take 1 tablet by mouth daily.       VITAMIN A EX 5000 1 tab qd        MULTIVITAMIN TABS   OR   0     CALCIUM  500 MG OR TABS   0     RIZATRIPTAN BENZOATE PO        diazepam (VALIUM) 5 MG tablet Take 1 tablet (5 mg) by mouth every 6 hours as needed for muscle spasms or pain (Patient not taking: Reported on 9/25/2017) 2 tablet 0     albuterol (PROAIR HFA/PROVENTIL HFA/VENTOLIN HFA) 108 (90 BASE) MCG/ACT Inhaler Inhale 1-2 puffs into the lungs every 4 hours as needed for shortness of breath / dyspnea or wheezing (Patient not taking: Reported on 9/25/2017) 1 Inhaler 0     Allergies   Allergen Reactions     Corticosteroids      flovent, pulmacort rash on face         Reviewed and updated as needed this visit by clinical staffTobacco  Med Hx  Surg Hx  Fam Hx  Soc Hx      Reviewed and updated as needed this visit by Provider         ROS:  Constitutional, HEENT, cardiovascular, pulmonary, gi and gu systems are negative, except as otherwise noted.      OBJECTIVE:   /70 (BP Location: Right arm, Patient Position: Chair, Cuff Size: Adult Regular)  Pulse 68  Temp 98.5  F (36.9  C) (Oral)  Resp 18  Wt 192 lb 6.4 oz (87.3 kg)  LMP 09/09/2017  SpO2 99%  Breastfeeding? No  BMI 28.41 kg/m2  Body mass index is 28.41 kg/(m^2).  GENERAL: healthy, alert and no distress  HENT: normal cephalic/atraumatic, ear canals and TM's normal, nose and mouth without ulcers or lesions, oropharynx clear, oral mucous membranes moist and right cheek bone is tender to palpation, in mouth the right upper jawline is tender to palpation  NECK: no adenopathy, no asymmetry, masses, or scars and thyroid normal to palpation  RESP: lungs clear to auscultation - no rales, rhonchi or wheezes  CV: regular rate and rhythm, normal S1 S2, no S3 or S4, no murmur, click or rub, no peripheral edema and peripheral pulses strong  MS: no gross musculoskeletal defects noted, no edema    Diagnostic Test Results:  Results for orders placed or performed in visit on 09/25/17 (from the past 24 hour(s))   Hemoglobin   Result Value Ref Range    Hemoglobin 11.3 (L) 11.7 -  15.7 g/dL       ASSESSMENT/PLAN:             1. Jaw pain  New problem, suspect may be tooth related.   Recommend she continue naproxen and ice.  F/U with dentist and if symptoms worsen or do not improve, f/u in clinic.    2. Iron deficiency anemia, unspecified iron deficiency anemia type  - Hemoglobin  - Ferritin    3. Other fatigue  New problem, given iron deficiency, hemoglobin checked, slightly low.  Will await TSH and ferritin levels.  Recommend awaiting labs, if normal and fatigue persists, f/u in clinic.  - TSH with free T4 reflex    4. Mild intermittent asthma without complication  Chronic issue, ACT and AAP updated.  - Asthma Action Plan (AAP)    5. Screening for breast cancer  - *MA Screening Digital Bilateral; Future    Risks, benefits and alternatives were discussed with patient. Agreeable to the plan of care.      Terra Issa PA-C  [facility] [place]      7/1/2015 negative, due in 5 years

## 2017-09-25 NOTE — NURSING NOTE
"Chief Complaint   Patient presents with     Musculoskeletal Problem       Initial /70 (BP Location: Right arm, Patient Position: Chair, Cuff Size: Adult Regular)  Pulse 68  Temp 98.5  F (36.9  C) (Oral)  Resp 18  Wt 192 lb 6.4 oz (87.3 kg)  LMP 09/09/2017  SpO2 99%  Breastfeeding? No  BMI 28.41 kg/m2 Estimated body mass index is 28.41 kg/(m^2) as calculated from the following:    Height as of 8/22/17: 5' 9\" (1.753 m).    Weight as of this encounter: 192 lb 6.4 oz (87.3 kg).  Medication Reconciliation: complete  Arnie Phillips MA      "

## 2017-09-25 NOTE — MR AVS SNAPSHOT
After Visit Summary   9/25/2017    aKthi Linn    MRN: [id number]           Patient Information     Date Of Birth          1975        Visit Information        Provider Department      9/25/2017 2:30 PM Terra Issa PA-C [facility]        Today's Diagnoses     Jaw pain    -  1    Iron deficiency anemia, unspecified iron deficiency anemia type        Other fatigue        Mild intermittent asthma without complication        Screening for breast cancer           Follow-ups after your visit        Future tests that were ordered for you today     Open Future Orders        Priority Expected Expires Ordered    *MA Screening Digital Bilateral Routine  9/25/2018 9/25/2017            Who to contact     If you have questions or need follow up information about today's clinic visit or your schedule please contact [facility] directly at [phone].  Normal or non-critical lab and imaging results will be communicated to you by [employer], letter or phone within 4 business days after the clinic has received the results. If you do not hear from us within 7 days, please contact the clinic through [employer] or phone. If you have a critical or abnormal lab result, we will notify you by phone as soon as possible.  Submit refill requests through [employer] or call your pharmacy and they will forward the refill request to us. Please allow 3 business days for your refill to be completed.          Additional Information About Your Visit        MyChart Information     [employer] gives you secure access to your electronic health record. If you see a primary care provider, you can also send messages to your care team and make appointments. If you have questions, please call your primary care clinic.  If you do not have a primary care provider, please call [phone] and they will assist you.        Care EveryWhere ID     This is your Care EveryWhere ID. This could be used by other  organizations to access your [facility] medical records  [id number]        Your Vitals Were     Pulse Temperature Respirations Last Period Pulse Oximetry Breastfeeding?    68 98.5  F (36.9  C) (Oral) 18 09/09/2017 99% No    BMI (Body Mass Index)                   28.41 kg/m2            Blood Pressure from Last 3 Encounters:   09/25/17 112/70   08/22/17 117/77   08/22/17 108/64    Weight from Last 3 Encounters:   09/25/17 192 lb 6.4 oz (87.3 kg)   08/22/17 190 lb (86.2 kg)   06/30/17 193 lb 12.8 oz (87.9 kg)              We Performed the Following     Asthma Action Plan (AAP)     Ferritin     Hemoglobin     TSH with free T4 reflex        Primary Care Provider Office Phone # Fax #    Reno Oates [phone]       XXX RESIGNED  E CAMILOJACOB [facility] [address]  [facility] [address]        Equal Access to Services     GLORIA LUNA : Hadii aad ku hadasho Soomaali, waaxda luqadaha, qaybta kaalmada adeegyada, waxay oscarin haychrisn javi ruth . So [facility] [phone].    ATENCIÓN: Si habla español, tiene a ventura disposición servicios gratuitos de asistencia lingüística. Llame al [phone].    We comply with applicable federal civil rights laws and [place] laws. We do not discriminate on the basis of race, color, national origin, age, disability sex, sexual orientation or gender identity.            Thank you!     Thank you for choosing [facility]  for your care. Our goal is always to provide you with excellent care. Hearing back from our patients is one way we can continue to improve our services. Please take a few minutes to complete the written survey that you may receive in the mail after your visit with us. Thank you!             Your Updated Medication List - Protect others around you: Learn how to safely use, store and throw away your medicines at www.disposemymeds.org.          This list is accurate as of: 9/25/17  3:11 PM.  Always use your most recent med list.                    Brand Name Dispense Instructions for use Diagnosis    albuterol 108 (90 BASE) MCG/ACT Inhaler    PROAIR HFA/PROVENTIL HFA/VENTOLIN HFA    1 Inhaler    Inhale 1-2 puffs into the lungs every 4 hours as needed for shortness of breath / dyspnea or wheezing    Mild intermittent asthma with exacerbation       BIOTIN      1 tablet daily        calcium carbonate 1250 MG tablet    OS-PIETER 500 mg [place]. Ca      Acute upper respiratory infections of other multiple sites       cholecalciferol 5000 UNITS Caps capsule    vitamin D3     Take 5,000 Units by mouth daily        diazepam 5 MG tablet    VALIUM    2 tablet    Take 1 tablet (5 mg) by mouth every 6 hours as needed for muscle spasms or pain        * EFFEXOR PO      Take by mouth 3 times daily        * venlafaxine 75 MG 24 hr capsule    EFFEXOR-XR     75 mg        ferrous gluconate 324 (38 FE) MG tablet    FERGON    30 tablet    Take 1 tablet (324 mg) by mouth 2 times daily        gabapentin 600 MG tablet    NEURONTIN     Take 600 mg by mouth daily        ibuprofen 200 MG tablet    ADVIL/MOTRIN    100 tablet    Take 1-2 tablets (200-400 mg) by mouth every 6 hours as needed for other (mild pain)    Hernia, ventral       MAGNESIA PO      Take 1 tablet by mouth daily        MULTIVITAMIN TABS   OR       Acute upper respiratory infections of other multiple sites       naproxen 500 MG tablet    NAPROSYN          naproxen sodium 550 MG tablet    ANAPROX          oxyCODONE-acetaminophen 5-325 MG per tablet    PERCOCET    20 tablet    Take 1-2 tablets by mouth every 6 hours as needed for moderate to severe pain        Promethazine HCl 50 MG Tabs      50 mg        RIZATRIPTAN BENZOATE PO           saccharomyces boulardii 250 MG capsule    FLORASTOR     Take 250 mg by mouth 2 times daily        tiZANidine 4 MG tablet    ZANAFLEX          VITAMIN A EX      5000 1 tab qd        VITAMIN E      1 tablet daily.        VITAMIN K PO      Take 1 tablet by mouth daily.        ZINC ACETATE PO            * Notice:  This list has 2 medication(s) that are the same as other medications prescribed for you. Read the directions carefully, and ask your doctor or other care provider to review them with you.

## 2017-09-26 LAB
FERRITIN SERPL-MCNC: 25 NG/ML (ref 12–150)
TSH SERPL DL<=0.005 MIU/L-ACNC: 1.4 MU/L (ref 0.4–4)

## 2017-09-26 ASSESSMENT — ASTHMA QUESTIONNAIRES: ACT_TOTALSCORE: 25

## 2017-09-30 ENCOUNTER — HEALTH MAINTENANCE LETTER (OUTPATIENT)
Age: 42
End: 2017-09-30

## 2017-11-10 ENCOUNTER — RADIANT APPOINTMENT (OUTPATIENT)
Dept: MAMMOGRAPHY | Facility: CLINIC | Age: 42
End: 2017-11-10
Attending: PHYSICIAN ASSISTANT
Payer: COMMERCIAL

## 2017-11-10 DIAGNOSIS — Z12.39 SCREENING FOR BREAST CANCER: ICD-10-CM

## 2017-11-10 PROCEDURE — G0202 SCR MAMMO BI INCL CAD: HCPCS | Mod: TC

## 2018-02-21 ENCOUNTER — TELEPHONE (OUTPATIENT)
Dept: FAMILY MEDICINE | Facility: CLINIC | Age: 43
End: 2018-02-21

## 2018-02-21 NOTE — TELEPHONE ENCOUNTER
Patient is on fail list for asthma, UTD on AAP and ACT. Last score for ACT is 25.  Arnie Phillips CMA (AAMA)

## 2018-03-21 ENCOUNTER — TELEPHONE (OUTPATIENT)
Dept: FAMILY MEDICINE | Facility: CLINIC | Age: 43
End: 2018-03-21

## 2018-03-21 NOTE — TELEPHONE ENCOUNTER
Panel Management Review      Patient has the following on her problem list:     Asthma review     ACT Total Scores 9/25/2017   ACT TOTAL SCORE -   ASTHMA ER VISITS -   ASTHMA HOSPITALIZATIONS -   ACT TOTAL SCORE (Goal Greater than or Equal to 20) 25   In the past 12 months, how many times did you visit the emergency room for your asthma without being admitted to the hospital? (No Data)   In the past 12 months, how many times were you hospitalized overnight because of your asthma? (No Data)      1. Is Asthma diagnosis on the Problem List? Yes    2. Is Asthma listed on Health Maintenance? Yes    3. Patient is due for:  ACT      Composite cancer screening  Chart review shows that this patient is due/due soon for the following None  Summary:    Patient is due/failing the following:   ACT    Action needed:   Patient needs to do ACT.    Type of outreach:    Copy of ACT mailed to patient, will reach out in 5 days.    Questions for provider review:    None                                                                                                                                  Arnie Phillips CMA (AAMA)     Chart routed to Care Team.

## 2018-03-21 NOTE — LETTER
March 21, 2018      Kathi Linn  [id number]  [place] [address]        Dear Ms. Kathi OWENS [place],      We have attached an asthma control test to help monitor your asthma symptoms.  Please fill it out and return to our clinic in the envelope provided.  As always, please call us with any questions or concerns at [phone].    Thank you for making [facility] your preferred provider.    Sincerely,    Arnie Phillips CMA ([facility])

## 2018-03-28 NOTE — TELEPHONE ENCOUNTER
Patient called back, did ACT via phone, perfect score of 25.      Patient said she has not used her inhaler for YEARS.

## 2018-03-28 NOTE — TELEPHONE ENCOUNTER
2nd attempt, LM for pt to call back.  Did she get ACT in mail?  Arnie Phillips CMA ([facility])

## 2018-03-29 ASSESSMENT — ASTHMA QUESTIONNAIRES: ACT_TOTALSCORE: 25

## 2018-06-05 ENCOUNTER — OFFICE VISIT (OUTPATIENT)
Dept: INTERNAL MEDICINE | Facility: CLINIC | Age: 43
End: 2018-06-05
Payer: COMMERCIAL

## 2018-06-05 ENCOUNTER — MYC MEDICAL ADVICE (OUTPATIENT)
Dept: INTERNAL MEDICINE | Facility: CLINIC | Age: 43
End: 2018-06-05

## 2018-06-05 VITALS
HEIGHT: 69 IN | DIASTOLIC BLOOD PRESSURE: 70 MMHG | HEART RATE: 71 BPM | SYSTOLIC BLOOD PRESSURE: 114 MMHG | OXYGEN SATURATION: 100 % | BODY MASS INDEX: 25.92 KG/M2 | TEMPERATURE: 97.8 F | WEIGHT: 175 LBS

## 2018-06-05 DIAGNOSIS — J45.20 MILD INTERMITTENT ASTHMA WITHOUT COMPLICATION: ICD-10-CM

## 2018-06-05 DIAGNOSIS — R21 RASH: ICD-10-CM

## 2018-06-05 DIAGNOSIS — Z00.00 ENCOUNTER FOR ROUTINE ADULT HEALTH EXAMINATION WITHOUT ABNORMAL FINDINGS: Primary | ICD-10-CM

## 2018-06-05 DIAGNOSIS — E04.1 RIGHT THYROID NODULE: ICD-10-CM

## 2018-06-05 DIAGNOSIS — R10.13 ABDOMINAL PAIN, EPIGASTRIC: ICD-10-CM

## 2018-06-05 LAB
ALBUMIN SERPL-MCNC: 3.7 G/DL (ref 3.4–5)
ALP SERPL-CCNC: 59 U/L (ref 40–150)
ALT SERPL W P-5'-P-CCNC: 30 U/L (ref 0–50)
ANION GAP SERPL CALCULATED.3IONS-SCNC: 8 MMOL/L (ref 3–14)
AST SERPL W P-5'-P-CCNC: 18 U/L (ref 0–45)
BASOPHILS # BLD AUTO: 0 10E9/L (ref 0–0.2)
BASOPHILS NFR BLD AUTO: 0.3 %
BILIRUB SERPL-MCNC: 1.3 MG/DL (ref 0.2–1.3)
BUN SERPL-MCNC: 11 MG/DL (ref 7–30)
CALCIUM SERPL-MCNC: 8.6 MG/DL (ref 8.5–10.1)
CHLORIDE SERPL-SCNC: 111 MMOL/L (ref 94–109)
CHOLEST SERPL-MCNC: 127 MG/DL
CO2 SERPL-SCNC: 23 MMOL/L (ref 20–32)
CREAT SERPL-MCNC: 0.76 MG/DL (ref 0.52–1.04)
DIFFERENTIAL METHOD BLD: NORMAL
EOSINOPHIL # BLD AUTO: 0.2 10E9/L (ref 0–0.7)
EOSINOPHIL NFR BLD AUTO: 2.9 %
ERYTHROCYTE [DISTWIDTH] IN BLOOD BY AUTOMATED COUNT: 13.1 % (ref 10–15)
GFR SERPL CREATININE-BSD FRML MDRD: 83 ML/MIN/1.7M2
GLUCOSE SERPL-MCNC: 77 MG/DL (ref 70–99)
HCT VFR BLD AUTO: 36.7 % (ref 35–47)
HDLC SERPL-MCNC: 71 MG/DL
HGB BLD-MCNC: 11.7 G/DL (ref 11.7–15.7)
LDLC SERPL CALC-MCNC: 45 MG/DL
LYMPHOCYTES # BLD AUTO: 1.7 10E9/L (ref 0.8–5.3)
LYMPHOCYTES NFR BLD AUTO: 24.8 %
MCH RBC QN AUTO: 30.1 PG (ref 26.5–33)
MCHC RBC AUTO-ENTMCNC: 31.9 G/DL (ref 31.5–36.5)
MCV RBC AUTO: 94 FL (ref 78–100)
MONOCYTES # BLD AUTO: 0.7 10E9/L (ref 0–1.3)
MONOCYTES NFR BLD AUTO: 10.2 %
NEUTROPHILS # BLD AUTO: 4.3 10E9/L (ref 1.6–8.3)
NEUTROPHILS NFR BLD AUTO: 61.8 %
NONHDLC SERPL-MCNC: 56 MG/DL
PLATELET # BLD AUTO: 230 10E9/L (ref 150–450)
POTASSIUM SERPL-SCNC: 3.9 MMOL/L (ref 3.4–5.3)
PROT SERPL-MCNC: 6.8 G/DL (ref 6.8–8.8)
RBC # BLD AUTO: 3.89 10E12/L (ref 3.8–5.2)
SODIUM SERPL-SCNC: 142 MMOL/L (ref 133–144)
TRIGL SERPL-MCNC: 54 MG/DL
TSH SERPL DL<=0.005 MIU/L-ACNC: 1.49 MU/L (ref 0.4–4)
WBC # BLD AUTO: 6.9 10E9/L (ref 4–11)

## 2018-06-05 PROCEDURE — 84443 ASSAY THYROID STIM HORMONE: CPT | Performed by: INTERNAL MEDICINE

## 2018-06-05 PROCEDURE — 36415 COLL VENOUS BLD VENIPUNCTURE: CPT | Performed by: INTERNAL MEDICINE

## 2018-06-05 PROCEDURE — 80061 LIPID PANEL: CPT | Performed by: INTERNAL MEDICINE

## 2018-06-05 PROCEDURE — 99396 PREV VISIT EST AGE 40-64: CPT | Performed by: INTERNAL MEDICINE

## 2018-06-05 PROCEDURE — 85025 COMPLETE CBC W/AUTO DIFF WBC: CPT | Performed by: INTERNAL MEDICINE

## 2018-06-05 PROCEDURE — 80053 COMPREHEN METABOLIC PANEL: CPT | Performed by: INTERNAL MEDICINE
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
## 2018-06-05 NOTE — PATIENT INSTRUCTIONS
Zantac    Preventive Health Recommendations  Female Ages 40 to 49    Yearly exam:     See your health care provider every year in order to  1. Review health changes.   2. Discuss preventive care.    3. Review your medicines if your doctor prescribed any.      Get a Pap test every three years (unless you have an abnormal result and your provider advises testing more often).      If you get Pap tests with HPV test, you only need to test every 5 years, unless you have an abnormal result. You do not need a Pap test if your uterus was removed (hysterectomy) and you have not had cancer.      You should be tested each year for STDs (sexually transmitted diseases), if you're at risk.       Ask your doctor if you should have a mammogram.      Have a colonoscopy (test for colon cancer) if someone in your family has had colon cancer or polyps before age 50.       Have a cholesterol test every 5 years.       Have a diabetes test (fasting glucose) after age 45. If you are at risk for diabetes, you should have this test every 3 years.    Shots: Get a flu shot each year. Get a tetanus shot every 10 years.     Nutrition:     Eat at least 5 servings of fruits and vegetables each day.    Eat whole-grain bread, whole-wheat pasta and brown rice instead of white grains and rice.    Talk to your provider about Calcium and Vitamin D.     Lifestyle    Exercise at least 150 minutes a week (an average of 30 minutes a day, 5 days a week). This will help you control your weight and prevent disease.    Limit alcohol to one drink per day.    No smoking.     Wear sunscreen to prevent skin cancer.    See your dentist every six months for an exam and cleaning.

## 2018-06-05 NOTE — MR AVS SNAPSHOT
After Visit Summary   6/5/2018    Kathi Linn    MRN: [id number]           Patient Information     Date Of Birth          1975        Visit Information        Provider Department      6/5/2018 8:20 AM Aida Clement MD [facility]        Today's Diagnoses     Encounter for routine adult health examination without abnormal findings    -  1    Abdominal pain, epigastric        Right thyroid nodule        Mild intermittent asthma without complication        Rash          Care Instructions    Zantac    Preventive Health Recommendations  Female Ages 40 to 49    Yearly exam:     See your health care provider every year in order to  1. Review health changes.   2. Discuss preventive care.    3. Review your medicines if your doctor prescribed any.      Get a Pap test every three years (unless you have an abnormal result and your provider advises testing more often).      If you get Pap tests with HPV test, you only need to test every 5 years, unless you have an abnormal result. You do not need a Pap test if your uterus was removed (hysterectomy) and you have not had cancer.      You should be tested each year for STDs (sexually transmitted diseases), if you're at risk.       Ask your doctor if you should have a mammogram.      Have a colonoscopy (test for colon cancer) if someone in your family has had colon cancer or polyps before age 50.       Have a cholesterol test every 5 years.       Have a diabetes test (fasting glucose) after age 45. If you are at risk for diabetes, you should have this test every 3 years.    Shots: Get a flu shot each year. Get a tetanus shot every 10 years.     Nutrition:     Eat at least 5 servings of fruits and vegetables each day.    Eat whole-grain bread, whole-wheat pasta and brown rice instead of white grains and rice.    Talk to your provider about Calcium and Vitamin D.     Lifestyle    Exercise at least 150 minutes a week (an average of 30  minutes a day, 5 days a week). This will help you control your weight and prevent disease.    Limit alcohol to one drink per day.    No smoking.     Wear sunscreen to prevent skin cancer.    See your dentist every six months for an exam and cleaning.          Follow-ups after your visit        Additional Services     DERMATOLOGY REFERRAL       Your provider has referred you to: [facility] [phone]  [email or web address]    Please be aware that coverage of these services is subject to the terms and limitations of your health insurance plan.  Call member services at your health plan with any benefit or coverage questions.      Please bring the following with you to your appointment:    (1) Any X-Rays, CTs or MRIs which have been performed.  Contact the facility where they were done to arrange for  prior to your scheduled appointment.    (2) List of current medications  (3) This referral request   (4) Any documents/labs given to you for this referral            GASTROENTEROLOGY ADULT REF CONSULT ONLY       Preferred Location: [facility] [phone]-- epigastric pain and concerns for celiac      Please be aware that coverage of these services is subject to the terms and limitations of your health insurance plan.  Call member services at your health plan with any benefit or coverage questions.  Any procedures must be performed at a [facility] facility OR coordinated by your clinic's referral office.    Please bring the following with you to your appointment:    (1) Any X-Rays, CTs or MRIs which have been performed.  Contact the facility where they were done to arrange for  prior to your scheduled appointment.    (2) List of current medications   (3) This referral request   (4) Any documents/labs given to you for this referral                  Future tests that were ordered for you today     Open Future Orders        Priority Expected Expires Ordered  "   US Thyroid Routine  6/5/2019 6/5/2018            Who to contact     If you have questions or need follow up information about today's clinic visit or your schedule please contact [facility] directly at [phone].  Normal or non-critical lab and imaging results will be communicated to you by MyChart, letter or phone within 4 business days after the clinic has received the results. If you do not hear from us within 7 days, please contact the clinic through MyChart or phone. If you have a critical or abnormal lab result, we will notify you by phone as soon as possible.  Submit refill requests through irisnote or call your pharmacy and they will forward the refill request to us. Please allow 3 business days for your refill to be completed.          Additional Information About Your Visit        [employer] Information     irisnote gives you secure access to your electronic health record. If you see a primary care provider, you can also send messages to your care team and make appointments. If you have questions, please call your primary care clinic.  If you do not have a primary care provider, please call [phone] and they will assist you.        Care EveryWhere ID     This is your Care EveryWhere ID. This could be used by other organizations to access your [place] medical records  [id number]        Your Vitals Were     Pulse Temperature Height Last Period Pulse Oximetry Breastfeeding?    71 97.8  F (36.6  C) (Oral) 5' 9\" (1.753 m) 06/02/2018 (Exact Date) 100% No    BMI (Body Mass Index)                   25.84 kg/m2            Blood Pressure from Last 3 Encounters:   06/05/18 114/70   09/25/17 112/70   08/22/17 117/77    Weight from Last 3 Encounters:   06/05/18 175 lb (79.4 kg)   09/25/17 192 lb 6.4 oz (87.3 kg)   08/22/17 190 lb (86.2 kg)              We Performed the Following     CBC with platelets differential     Comprehensive metabolic panel     DERMATOLOGY REFERRAL     GASTROENTEROLOGY " ADULT REF CONSULT ONLY     Lipid panel reflex to direct LDL Fasting     TSH with free T4 reflex          Today's Medication Changes          These changes are accurate as of 6/5/18  8:43 AM.  If you have any questions, ask your nurse or doctor.               Stop taking these medicines if you haven't already. Please contact your care team if you have questions.     diazepam 5 MG tablet   Commonly known as:  VALIUM   Stopped by:  Aida Clement MD           EFFEXOR PO   Stopped by:  Aida Clement MD           gabapentin 600 MG tablet   Commonly known as:  NEURONTIN   Stopped by:  Aida Clement MD           naproxen 500 MG tablet   Commonly known as:  NAPROSYN   Stopped by:  Aida Clement MD           oxyCODONE-acetaminophen 5-325 MG per tablet   Commonly known as:  PERCOCET   Stopped by:  Aida Clement MD           Promethazine HCl 50 MG Tabs   Stopped by:  Aida Clement MD           RIZATRIPTAN BENZOATE PO   Stopped by:  Aida Clement MD           saccharomyces boulardii 250 MG capsule   Commonly known as:  FLORASTOR   Stopped by:  Aida Clement MD           tiZANidine 4 MG tablet   Commonly known as:  ZANAFLEX   Stopped by:  Aida Clement MD           venlafaxine 75 MG 24 hr capsule   Commonly known as:  EFFEXOR-XR   Stopped by:  Aida Clement MD                    Primary Care Provider Office Phone # Fax #    Aida Clement [phone]       [address] [place] [address]        Equal Access to Services     [facility]: Hadii aad ku hadasho Soomaali, waaxda luqadaha, qaybta kaalmada adeegyada, waxay christine holcomb adeabdoulaye ruth . So [facility] [phone].    ATENCIÓN: Si habla español, tiene a ventura disposición servicios gratuitos de asistencia lingüística. Llame al [phone].    We comply with applicable federal civil rights laws and [place] laws. We do not discriminate on the basis of race, color, national  origin, age, disability, sex, sexual orientation, or gender identity.            Thank you!     Thank you for choosing [facility]  for your care. Our goal is always to provide you with excellent care. Hearing back from our patients is one way we can continue to improve our services. Please take a few minutes to complete the written survey that you may receive in the mail after your visit with us. Thank you!             Your Updated Medication List - Protect others around you: Learn how to safely use, store and throw away your medicines at www.disposemymeds.org.          This list is accurate as of 6/5/18  8:43 AM.  Always use your most recent med list.                   Brand Name Dispense Instructions for use Diagnosis    albuterol 108 (90 Base) MCG/ACT Inhaler    PROAIR HFA/PROVENTIL HFA/VENTOLIN HFA    1 Inhaler    Inhale 1-2 puffs into the lungs every 4 hours as needed for shortness of breath / dyspnea or wheezing    Mild intermittent asthma with exacerbation       BIOTIN      1 tablet daily        calcium carbonate 500 MG tablet    OS-PIETER 500 mg [place]. Ca      Acute upper respiratory infections of other multiple sites       cholecalciferol 5000 units Caps capsule    vitamin D3     Take 5,000 Units by mouth daily        ferrous gluconate 324 (38 Fe) MG tablet    FERGON    30 tablet    Take 1 tablet (324 mg) by mouth 2 times daily        ibuprofen 200 MG tablet    ADVIL/MOTRIN    100 tablet    Take 1-2 tablets (200-400 mg) by mouth every 6 hours as needed for other (mild pain)    Hernia, ventral       MAGNESIA PO      Take 1 tablet by mouth daily        MULTIVITAMIN TABS   OR       Acute upper respiratory infections of other multiple sites       naproxen sodium 550 MG tablet    ANAPROX          VITAMIN A EX      5000 1 tab qd        VITAMIN E      1 tablet daily.        VITAMIN K PO      Take 1 tablet by mouth daily.        ZINC ACETATE PO

## 2018-06-13 ENCOUNTER — TELEPHONE (OUTPATIENT)
Dept: INTERNAL MEDICINE | Facility: CLINIC | Age: 43
End: 2018-06-13

## 2018-07-09 ENCOUNTER — MYC MEDICAL ADVICE (OUTPATIENT)
Dept: INTERNAL MEDICINE | Facility: CLINIC | Age: 43
End: 2018-07-09

## 2018-07-09 DIAGNOSIS — R21 RASH: Primary | ICD-10-CM

## 2018-07-10 NOTE — TELEPHONE ENCOUNTER
Patient sent [employer] message requesting prescription for Elidel cream for rash on her face. She was using Cortisone cream as recommended at appointment in June, but it is not helping. Dr. Oates gave her samples of Elidel in the past and it is helping with the rash. Patient has appointment with Dermatologist, but not until 7/24/18.

## 2018-07-11 ENCOUNTER — MYC MEDICAL ADVICE (OUTPATIENT)
Dept: INTERNAL MEDICINE | Facility: CLINIC | Age: 43
End: 2018-07-11

## 2018-07-12 RX ORDER — PIMECROLIMUS 10 MG/G
CREAM TOPICAL
Qty: 30 G | Refills: 0 | Status: SHIPPED | OUTPATIENT
Start: 2018-07-12 | End: 2021-01-11

## 2018-07-24 ENCOUNTER — OFFICE VISIT (OUTPATIENT)
Dept: DERMATOLOGY | Facility: CLINIC | Age: 43
End: 2018-07-24
Payer: COMMERCIAL

## 2018-07-24 VITALS — DIASTOLIC BLOOD PRESSURE: 80 MMHG | OXYGEN SATURATION: 100 % | HEART RATE: 61 BPM | SYSTOLIC BLOOD PRESSURE: 118 MMHG

## 2018-07-24 DIAGNOSIS — L71.0 DERMATITIS, PERIORAL: Primary | ICD-10-CM

## 2018-07-24 PROCEDURE — 99213 OFFICE O/P EST LOW 20 MIN: CPT | Performed by: PHYSICIAN ASSISTANT

## 2018-07-24 RX ORDER — DOXYCYCLINE 100 MG/1
CAPSULE ORAL
Qty: 60 CAPSULE | Refills: 1 | Status: SHIPPED | OUTPATIENT
Start: 2018-07-24 | End: 2019-12-11

## 2018-07-24 RX ORDER — PREDNISONE 10 MG/1
TABLET ORAL
Qty: 15 TABLET | Refills: 0 | Status: SHIPPED | OUTPATIENT
Start: 2018-07-24 | End: 2019-12-11

## 2018-07-24 NOTE — PATIENT INSTRUCTIONS
This is perioral dermatitis    Continue using Elidel twice per day     Start prednisone every AM for 5 days. Ok to take benadry at night if needed for sleep     Start doxycycline twice per day with food and full glass of water. You will be more sun sensitive on this medication so use ample sunscreen.

## 2018-07-24 NOTE — MR AVS SNAPSHOT
After Visit Summary   7/24/2018    Kathi Linn    MRN: [id number]           Patient Information     Date Of Birth          1975        Visit Information        Provider Department      7/24/2018 4:15 PM Daya Benton PA-C [facility]        Today's Diagnoses     Dermatitis, perioral    -  1      Care Instructions    This is perioral dermatitis    Continue using Elidel twice per day     Start prednisone every AM for 5 days. Ok to take benadry at night if needed for sleep     Start doxycycline twice per day with food and full glass of water. You will be more sun sensitive on this medication so use ample sunscreen.           Follow-ups after your visit        Follow-up notes from your care team     Return in about 6 weeks (around 9/4/2018).      Your next 10 appointments already scheduled     Jul 24, 2018  4:15 PM CDT   [facility] with Daya Benton PA-C   [facility]    [address] [place] [address] [phone]              Who to contact     If you have questions or need follow up information about today's clinic visit or your schedule please contact [facility] directly at [phone].  Normal or non-critical lab and imaging results will be communicated to you by MyChart, letter or phone within 4 business days after the clinic has received the results. If you do not hear from us within 7 days, please contact the clinic through [employer] or phone. If you have a critical or abnormal lab result, we will notify you by phone as soon as possible.  Submit refill requests through [employer] or call your pharmacy and they will forward the refill request to us. Please allow 3 business days for your refill to be completed.          Additional Information About Your Visit        [employer] Information     [employer] gives you secure access to your electronic  health record. If you see a primary care provider, you can also send messages to your care team and make appointments. If you have questions, please call your primary care clinic.  If you do not have a primary care provider, please call [phone] and they will assist you.        Care EveryWhere ID     This is your Care EveryWhere ID. This could be used by other organizations to access your [place] medical records  [id number]        Your Vitals Were     Pulse Last Period Pulse Oximetry Breastfeeding?          61 07/01/2018 100% No         Blood Pressure from Last 3 Encounters:   07/24/18 118/80   06/05/18 114/70   09/25/17 112/70    Weight from Last 3 Encounters:   06/05/18 79.4 kg (175 lb)   09/25/17 87.3 kg (192 lb 6.4 oz)   08/22/17 86.2 kg (190 lb)              Today, you had the following     No orders found for display         Today's Medication Changes          These changes are accurate as of 7/24/18  4:00 PM.  If you have any questions, ask your nurse or doctor.               Start taking these medicines.        Dose/Directions    doxycycline monohydrate 100 MG capsule   Used for:  Dermatitis, perioral   Started by:  Daya Benton PA-C        1 cap PO BID with food and full glass of water   Quantity:  60 capsule   Refills:  1       predniSONE 10 MG tablet   Commonly known as:  DELTASONE   Used for:  Dermatitis, perioral   Started by:  Daya Benton PA-C        3 tabs PO QAM x 5 days   Quantity:  15 tablet   Refills:  0            Where to get your medicines      These medications were sent to [facility] [address] [place] [address] [id number] [place] [address]     Phone:  [phone]     doxycycline monohydrate 100 MG capsule    predniSONE 10 MG tablet                Primary Care Provider Office Phone # Fax #    Aida Clement [phone]       [address]  [facility] [address]        Equal  Access to Services     [facility]: Hadii alexis barker sary Devi, washerronda luqadaha, qaybta kapierceamber jiang. So [facility] [phone].    ATENCIÓN: Si habla bobby, tiene a ventura disposición servicios gratuitos de asistencia lingüística. Llame al [phone].    We comply with applicable federal civil rights laws and [place] laws. We do not discriminate on the basis of race, color, national origin, age, disability, sex, sexual orientation, or gender identity.            Thank you!     Thank you for choosing [facility]  for your care. Our goal is always to provide you with excellent care. Hearing back from our patients is one way we can continue to improve our services. Please take a few minutes to complete the written survey that you may receive in the mail after your visit with us. Thank you!             Your Updated Medication List - Protect others around you: Learn how to safely use, store and throw away your medicines at www.disposemymeds.org.          This list is accurate as of 7/24/18  4:00 PM.  Always use your most recent med list.                   Brand Name Dispense Instructions for use Diagnosis    albuterol 108 (90 Base) MCG/ACT Inhaler    PROAIR HFA/PROVENTIL HFA/VENTOLIN HFA    1 Inhaler    Inhale 1-2 puffs into the lungs every 4 hours as needed for shortness of breath / dyspnea or wheezing    Mild intermittent asthma with exacerbation       BIOTIN      1 tablet daily        calcium carbonate 500 MG tablet    OS-PIETER 500 mg [place]. Ca      Acute upper respiratory infections of other multiple sites       cholecalciferol 5000 units Caps capsule    vitamin D3     Take 5,000 Units by mouth daily        doxycycline monohydrate 100 MG capsule     60 capsule    1 cap PO BID with food and full glass of water    Dermatitis, perioral       ferrous gluconate 324 (38 Fe) MG tablet    FERGON    30 tablet    Take 1 tablet (324 mg) by mouth 2 times  daily        ibuprofen 200 MG tablet    ADVIL/MOTRIN    100 tablet    Take 1-2 tablets (200-400 mg) by mouth every 6 hours as needed for other (mild pain)    Hernia, ventral       MAGNESIA PO      Take 1 tablet by mouth daily        MULTIVITAMIN TABS   OR       Acute upper respiratory infections of other multiple sites       naproxen sodium 550 MG tablet    ANAPROX          pimecrolimus 1 % cream    ELIDEL    30 g    Apply externally bid    Rash       predniSONE 10 MG tablet    DELTASONE    15 tablet    3 tabs PO QAM x 5 days    Dermatitis, perioral       VITAMIN A EX      5000 1 tab qd        VITAMIN E      1 tablet daily.        VITAMIN K PO      Take 1 tablet by mouth daily.        ZINC ACETATE PO

## 2018-07-24 NOTE — PROGRESS NOTES
HPI:   Kathi Linn is a 43 year old female who presents for evaluation of a rash on the face  chief complaint  Location: around the mouth - itchy/irritated bumps around the mouth that will not go away    Condition present for:  2 months .   Previous treatments include: HC cream which helped but then came back.     Review Of Systems  Eyes: negative  Ears/Nose/Throat: negative  Respiratory: No shortness of breath, dyspnea on exertion, cough, or hemoptysis  Cardiovascular: negative  Gastrointestinal: negative  Genitourinary: negative  Musculoskeletal: negative  Neurologic: negative  Psychiatric: negative    Shx: Life has been a country song lately -  from ; son broke his leg and dog has a spinal injury.     PHYSICAL EXAM:    /80  Pulse 61  LMP 07/01/2018  SpO2 100%  Breastfeeding? No  Skin exam performed as follows: Type 2 skin. Mood appropriate  Alert and Oriented X 3. Well developed, well nourished in no distress.  General appearance: Normal  Head including face: Normal  Eyes: conjunctiva and lids: Normal  Mouth: Lips, teeth, gums: Normal  Neck: Normal  Chest-breast/axillae: Normal  Back: Normal  Spleen and liver: Normal  Cardiovascular: Exam of peripheral vascular system by observation for swelling, varicosities, edema: Normal  Genitalia: groin, buttocks: Normal  Extremities: digits/nails (clubbing): Normal  Eccrine and Apocrine glands: Normal  Right upper extremity: Normal  Left upper extremity: Normal  Right lower extremity: Normal  Left lower extremity: Normal  Skin: Scalp and body hair: See below    1. Papular eruption around the mouth    ASSESSMENT/PLAN:     1. Perioral dermatitis - advised on diagnosis and treatment options. Has tried HC cream in the past. Discussed PO and topical medications. Has been using Elidel the past couple of days.  --Start prednisone 30 mg QAM x 5 days. Ok to take Benadryl at night if needed for sleep.   --Start doxycycline 100 mg BID x 2 months. Advised  to take with food. Discussed risk of GI upset, esophagitis and photosensitivity.   --Continue Elidel BID until f/u          Follow-up: 6 weeks  CC:   Scribed By: Daya Benton MS, PA-C

## 2018-07-24 NOTE — LETTER
7/24/2018         RE: Kathi Linn  [address]  [place] [address]        Dear Colleague,    Thank you for referring your patient, Kathi Linn, to the [facility]. Please see a copy of my visit note below.    HPI:   Kathi Linn is a 43 year old female who presents for evaluation of a rash on the face  chief complaint  Location: around the mouth - itchy/irritated bumps around the mouth that will not go away    Condition present for:  2 months .   Previous treatments include: HC cream which helped but then came back.     Review Of Systems  Eyes: negative  Ears/Nose/Throat: negative  Respiratory: No shortness of breath, dyspnea on exertion, cough, or hemoptysis  Cardiovascular: negative  Gastrointestinal: negative  Genitourinary: negative  Musculoskeletal: negative  Neurologic: negative  Psychiatric: negative    Shx: Life has been a country song lately -  from ; son broke his leg and dog has a spinal injury.     PHYSICAL EXAM:    /80  Pulse 61  LMP 07/01/2018  SpO2 100%  Breastfeeding? No  Skin exam performed as follows: Type 2 skin. Mood appropriate  Alert and Oriented X 3. Well developed, well nourished in no distress.  General appearance: Normal  Head including face: Normal  Eyes: conjunctiva and lids: Normal  Mouth: Lips, teeth, gums: Normal  Neck: Normal  Chest-breast/axillae: Normal  Back: Normal  Spleen and liver: Normal  Cardiovascular: Exam of peripheral vascular system by observation for swelling, varicosities, edema: Normal  Genitalia: groin, buttocks: Normal  Extremities: digits/nails (clubbing): Normal  Eccrine and Apocrine glands: Normal  Right upper extremity: Normal  Left upper extremity: Normal  Right lower extremity: Normal  Left lower extremity: Normal  Skin: Scalp and body hair: See below    1. Papular eruption around the mouth    ASSESSMENT/PLAN:     1. Perioral dermatitis - advised on diagnosis and treatment options.  Has tried HC cream in the past. Discussed PO and topical medications. Has been using Elidel the past couple of days.  --Start prednisone 30 mg QAM x 5 days. Ok to take Benadryl at night if needed for sleep.   --Start doxycycline 100 mg BID x 2 months. Advised to take with food. Discussed risk of GI upset, esophagitis and photosensitivity.   --Continue Elidel BID until f/u          Follow-up: 6 weeks  CC:   Scribed By: Daya Benton, MS, PA-C      Again, thank you for allowing me to participate in the care of your patient.        Sincerely,        Daya Benton, PA-C

## 2018-07-26 ENCOUNTER — TELEPHONE (OUTPATIENT)
Dept: DERMATOLOGY | Facility: CLINIC | Age: 43
End: 2018-07-26

## 2018-07-26 NOTE — TELEPHONE ENCOUNTER
Prior Authorization Approval    Authorization Effective Date: 6/26/2018  Authorization Expiration Date: 7/26/2019  Medication: DOXYCYCLINE MONOHYDRATE 100MG CAPSULES  Approved Dose/Quantity:    Reference #:     Insurance Company: [employer] - Phone [phone] Fax [phone]  Expected CoPay:       CoPay Card Available:      Foundation Assistance Needed:    Which Pharmacy is filling the prescription (Not needed for infusion/clinic administered): [facility] [address] [place], [place] [address]VE AT [place] [address]  Pharmacy Notified: Yes  Patient Notified: Yes

## 2018-07-26 NOTE — TELEPHONE ENCOUNTER
Central Prior Authorization Team   Phone: [phone]    PA Initiation    Medication: DOXYCYCLINE MONOHYDRATE 100MG CAPSULES  Insurance Company: [employer] - Phone [phone] Fax [phone]  Pharmacy Filling the Rx: [facility] [address] [place] [address] [address] AT [place] [address]  Filling Pharmacy Phone: [phone]  Filling Pharmacy Fax:    Start Date: 7/26/2018

## 2018-07-26 NOTE — TELEPHONE ENCOUNTER
Pls initiate prior auth - DOXYCYCLINE MONOHYDRATE 100MG CAPSULES    [facility] listed in contact information.    KEY = [id number]  [facility]  PATIENT ID = [id number]  GROUP = [id number]

## 2018-08-13 ENCOUNTER — HOSPITAL ENCOUNTER (OUTPATIENT)
Dept: ULTRASOUND IMAGING | Facility: CLINIC | Age: 43
Discharge: HOME | End: 2018-08-13
Attending: INTERNAL MEDICINE | Admitting: INTERNAL MEDICINE
Payer: COMMERCIAL

## 2018-08-13 DIAGNOSIS — E04.1 RIGHT THYROID NODULE: ICD-10-CM

## 2018-08-13 PROCEDURE — 76536 US EXAM OF HEAD AND NECK: CPT

## 2018-08-16 ENCOUNTER — MYC MEDICAL ADVICE (OUTPATIENT)
Dept: INTERNAL MEDICINE | Facility: CLINIC | Age: 43
End: 2018-08-16

## 2018-08-16 DIAGNOSIS — E04.1 THYROID NODULE: Primary | ICD-10-CM

## 2018-08-17 NOTE — TELEPHONE ENCOUNTER
The thyroid nodule is unchanged.    Will have her seen endocrinology to determine how often this should be biopsied, as she has had multiple biopsies

## 2018-09-01 ENCOUNTER — HEALTH MAINTENANCE LETTER (OUTPATIENT)
Age: 43
End: 2018-09-01

## 2019-08-02 ENCOUNTER — TELEPHONE (OUTPATIENT)
Dept: FAMILY MEDICINE | Facility: OTHER | Age: 44
End: 2019-08-02

## 2019-08-02 NOTE — TELEPHONE ENCOUNTER
8/2/2019    Attempt 1    Contacted patient in regards to scheduling VIP mammogram  Message on voicemail     Patient is also due for - Preventive Health Screening Mammogram    Comments: [phone]      Outreach   cs

## 2019-08-28 NOTE — TELEPHONE ENCOUNTER
8/27/2019    Attempt 3    Contacted patient in regards to scheduling VIP mammogram screening at [facility] on 9/24  Message on voicemail     Patient is also due for - Preventive Health Screening Cervical/PAP and PHYSICAL    Comments: Valerie made first 2 attempts      Outreach

## 2019-10-02 ENCOUNTER — HEALTH MAINTENANCE LETTER (OUTPATIENT)
Age: 44
End: 2019-10-02

## 2019-10-24 ENCOUNTER — OFFICE VISIT (OUTPATIENT)
Dept: INTERNAL MEDICINE | Facility: CLINIC | Age: 44
End: 2019-10-24
Payer: COMMERCIAL

## 2019-10-24 VITALS
SYSTOLIC BLOOD PRESSURE: 116 MMHG | TEMPERATURE: 98.4 F | WEIGHT: 177.9 LBS | OXYGEN SATURATION: 95 % | BODY MASS INDEX: 26.35 KG/M2 | HEART RATE: 98 BPM | DIASTOLIC BLOOD PRESSURE: 72 MMHG | RESPIRATION RATE: 18 BRPM | HEIGHT: 69 IN

## 2019-10-24 DIAGNOSIS — Z23 NEED FOR VACCINATION: ICD-10-CM

## 2019-10-24 DIAGNOSIS — Z01.818 PREOP GENERAL PHYSICAL EXAM: Primary | ICD-10-CM

## 2019-10-24 DIAGNOSIS — Z23 NEED FOR PROPHYLACTIC VACCINATION AND INOCULATION AGAINST INFLUENZA: ICD-10-CM

## 2019-10-24 LAB
ERYTHROCYTE [DISTWIDTH] IN BLOOD BY AUTOMATED COUNT: 11.4 % (ref 10–15)
HCT VFR BLD AUTO: 37.5 % (ref 35–47)
HGB BLD-MCNC: 12.3 G/DL (ref 11.7–15.7)
MCH RBC QN AUTO: 31.1 PG (ref 26.5–33)
MCHC RBC AUTO-ENTMCNC: 32.8 G/DL (ref 31.5–36.5)
MCV RBC AUTO: 95 FL (ref 78–100)
PLATELET # BLD AUTO: 252 10E9/L (ref 150–450)
RBC # BLD AUTO: 3.96 10E12/L (ref 3.8–5.2)
WBC # BLD AUTO: 6.2 10E9/L (ref 4–11)

## 2019-10-24 PROCEDURE — 99214 OFFICE O/P EST MOD 30 MIN: CPT | Mod: 25 | Performed by: NURSE PRACTITIONER

## 2019-10-24 PROCEDURE — 90472 IMMUNIZATION ADMIN EACH ADD: CPT | Performed by: NURSE PRACTITIONER

## 2019-10-24 PROCEDURE — 90471 IMMUNIZATION ADMIN: CPT | Performed by: NURSE PRACTITIONER

## 2019-10-24 PROCEDURE — 90686 IIV4 VACC NO PRSV 0.5 ML IM: CPT | Performed by: NURSE PRACTITIONER

## 2019-10-24 PROCEDURE — 36415 COLL VENOUS BLD VENIPUNCTURE: CPT | Performed by: NURSE PRACTITIONER

## 2019-10-24 PROCEDURE — 90715 TDAP VACCINE 7 YRS/> IM: CPT | Performed by: NURSE PRACTITIONER

## 2019-10-24 PROCEDURE — 80048 BASIC METABOLIC PNL TOTAL CA: CPT | Performed by: NURSE PRACTITIONER

## 2019-10-24 PROCEDURE — 85027 COMPLETE CBC AUTOMATED: CPT | Performed by: NURSE PRACTITIONER

## 2019-10-24 ASSESSMENT — MIFFLIN-ST. JEOR: SCORE: 1511.33

## 2019-10-24 NOTE — PROGRESS NOTES
[place] [address]  [facility] [address]  [phone]  Dept: [phone]    PRE-OP EVALUATION:  Today's date: 10/24/2019    Kathi iLnn (: 1975) presents for pre-operative evaluation assessment as requested by Dr. Jeff.  She requires evaluation and anesthesia risk assessment prior to undergoing surgery/procedure for treatment of  abdominoplasty and tummy tuck.    [facility]  [phone]   Primary Physician: Aida Clement  Type of Anesthesia Anticipated: General    Patient has a Health Care Directive or Living Will:  NO    Preop Questions 10/24/2019   Who is doing your surgery? Dr Jeff   What are you having done? abdominoplasty   Date of Surgery/Procedure: Nov 15   Facility or Hospital where procedure/surgery will be performed: [facility]   1.  Do you have a history of Heart attack, stroke, stent, coronary bypass surgery, or other heart surgery? No   2.  Do you ever have any pain or discomfort in your chest? No   3.  Do you have a history of  Heart Failure? No   4.   Are you troubled by shortness of breath when:  walking on a level surface, or up a slight hill, or at night? No   5.  Do you currently have a cold, bronchitis or other respiratory infection? No   6.  Do you have a cough, shortness of breath, or wheezing? No   7.  Do you sometimes get pains in the calves of your legs when you walk? No   8. Do you or anyone in your family have previous history of blood clots? No   9.  Do you or does anyone in your family have a serious bleeding problem such as prolonged bleeding following surgeries or cuts? No   10. Have you ever had problems with anemia or been told to take iron pills? YES - malabsorption   11. Have you had any abnormal blood loss such as black, tarry or bloody stools, or abnormal vaginal bleeding? YES -    12. Have you ever had a blood transfusion? No   13. Have you or any of your relatives ever had problems with  anesthesia? No   14. Do you have sleep apnea, excessive snoring or daytime drowsiness? No   15. Do you have any prosthetic heart valves? No   16. Do you have prosthetic joints? No   17. Is there any chance that you may be pregnant? No         HPI:     HPI related to upcoming procedure: abdominoplasty        See problem list for active medical problems.  Problems all longstanding and stable, except as noted/documented.  See ROS for pertinent symptoms related to these conditions.      MEDICAL HISTORY:     Patient Active Problem List    Diagnosis Date Noted     Anemia 07/11/2014     Priority: Medium     Hernia 10/02/2013     Priority: Medium     Anemia, iron deficiency 01/30/2012     Priority: Medium     Malabsorption 01/30/2012     Priority: Medium     CARDIOVASCULAR SCREENING; LDL GOAL LESS THAN 160 10/31/2010     Priority: Medium     Bariatric surgery status 09/24/2009     Priority: Medium     Irritable bowel syndrome 03/27/2007     Priority: Medium     Other specified disorder of skin 10/26/2006     Priority: Medium     Mild intermittent asthma 09/19/2006     Priority: Medium     Obesity 02/18/2003     Priority: Medium     Problem list name updated by automated process. Provider to review        Past Medical History:   Diagnosis Date     Anemia      Gastro-oesophageal reflux disease      Hx of gastric ulcer      Irritable bowel syndrome     improved since gastric surgery     Lumbago 1991    With Right Leg Radiation  MRI=L5-S1 disc     Mild intermittent asthma 9/19/2006     Obesity, unspecified     298# prior to gastric Bypass     Pneumonia, organism unspecified(486) 05/00    RLL     Past Surgical History:   Procedure Laterality Date     ABDOMEN SURGERY      DUODENAL SWITCH  2007     C/SECTION, CLASSICAL  1999, 2002     CHOLECYSTECTOMY       HERNIORRHAPHY VENTRAL  10/2/2013    Procedure: HERNIORRHAPHY VENTRAL;  Open Ventral hernia Repair with mesh;  Surgeon: Orly Dunlap MD;  Location: [facility]  TEETH[       Current Outpatient Medications   Medication Sig Dispense Refill     albuterol (PROAIR HFA/PROVENTIL HFA/VENTOLIN HFA) 108 (90 BASE) MCG/ACT Inhaler Inhale 1-2 puffs into the lungs every 4 hours as needed for shortness of breath / dyspnea or wheezing 1 Inhaler 0     BIOTIN 1 tablet daily        CALCIUM 500 MG OR TABS   0     cholecalciferol (VITAMIN D3) 5000 UNITS CAPS capsule Take 5,000 Units by mouth daily       doxycycline monohydrate 100 MG capsule 1 cap PO BID with food and full glass of water 60 capsule 1     ferrous gluconate (FERGON) 324 (38 FE) MG tablet Take 1 tablet (324 mg) by mouth 2 times daily 30 tablet 0     ibuprofen (ADVIL,MOTRIN) 200 MG tablet Take 1-2 tablets (200-400 mg) by mouth every 6 hours as needed for other (mild pain) 100 tablet 0     Magnesium Hydroxide (MAGNESIA PO) Take 1 tablet by mouth daily        MULTIVITAMIN TABS   OR   0     naproxen sodium (ANAPROX) 550 MG tablet   3     pimecrolimus (ELIDEL) 1 % cream Apply externally bid 30 g 0     predniSONE (DELTASONE) 10 MG tablet 3 tabs PO QAM x 5 days 15 tablet 0     VITAMIN A EX 5000 1 tab qd        VITAMIN E 1 tablet daily.       VITAMIN K PO Take 1 tablet by mouth daily.       Zinc Acetate, Oral, (ZINC ACETATE PO)        OTC products: None, except as noted above    Allergies   Allergen Reactions     Corticosteroids      flovent, pulmacort rash on face      Latex Allergy: NO    Social History     Tobacco Use     Smoking status: Former Smoker     Packs/day: 0.10     Years: 2.00     Pack years: 0.20     Last attempt to quit: 1998     Years since quittin.8     Smokeless tobacco: Never Used     Tobacco comment: QUIT in    Substance Use Topics     Alcohol use: Yes     Alcohol/week: 0.0 standard drinks     Comment: 3 DRINKS PER WEEK     History   Drug Use No       REVIEW OF SYSTEMS:   CONSTITUTIONAL: NEGATIVE for fever, chills, change in weight  ENT/MOUTH: NEGATIVE for ear, mouth and throat problems  RESP: NEGATIVE  "for significant cough or SOB  CV: NEGATIVE for chest pain, palpitations or peripheral edema  GI: NEGATIVE for nausea, abdominal pain, heartburn, or change in bowel habits  : NEGATIVE for frequency, dysuria, or hematuria  MUSCULOSKELETAL: NEGATIVE for significant arthralgias or myalgia  NEURO: NEGATIVE for weakness, dizziness or paresthesias  ENDOCRINE: NEGATIVE for temperature intolerance, skin/hair changes  HEME: NEGATIVE for bleeding problems  PSYCHIATRIC: NEGATIVE for changes in mood or affect    EXAM:   /72 (BP Location: Right arm, Patient Position: Sitting, Cuff Size: Adult Regular)   Pulse 98   Temp 98.4  F (36.9  C) (Oral)   Resp 18   Ht 1.753 m (5' 9\")   Wt 80.7 kg (177 lb 14.4 oz)   SpO2 95%   BMI 26.27 kg/m        GENERAL APPEARANCE: healthy, alert and no distress     NECK: no adenopathy, no asymmetry, masses, or scars and thyroid normal to palpation     RESP: lungs clear to auscultation - no rales, rhonchi or wheezes     CV: regular rates and rhythm, normal S1 S2, no S3 or S4 and no murmur, click or rub     ABDOMEN:  soft, nontender, no HSM or masses and bowel sounds normal     MS: extremities normal- no gross deformities noted, no evidence of inflammation in joints, FROM in all extremities.     NEURO: Normal strength and tone, sensory exam grossly normal, mentation intact and speech normal     PSYCH: mentation appears normal. and affect normal/bright     LYMPHATICS: No cervical adenopathy    DIAGNOSTICS:   EKG: Not indicated due to non-vascular surgery and low risk of event (age <65 and without cardiac risk factors)    Recent Labs   Lab Test 06/05/18  0854 09/25/17  1448 06/30/17  1800  06/30/15  1016  07/11/14  0959  02/16/12  1616   HGB 11.7 11.3* 12.8   < > 8.3*   < > 8.2*   < >  --      --  275   < >  --    < >  --    < >  --    INR  --   --   --   --   --   --   --   --  1.17*     --  138  --  142   < > 141   < >  --    POTASSIUM 3.9  --  5.1  --  4.5   < > 4.3   < >  --  "   CR 0.76  --  0.80  --  0.76   < > 0.82   < >  --    A1C  --   --   --   --  4.9  --  5.6  --   --     < > = values in this interval not displayed.        IMPRESSION:   Reason for surgery/procedure: abdominoplasty     The proposed surgical procedure is considered INTERMEDIATE risk.    REVISED CARDIAC RISK INDEX  The patient has the following serious cardiovascular risks for perioperative complications such as (MI, PE, VFib and 3  AV Block):  No serious cardiac risks  INTERPRETATION: 0 risks: Class I (very low risk - 0.4% complication rate)    The patient has the following additional risks for perioperative complications:  No identified additional risks      ICD-10-CM    1. Preop general physical exam Z01.818      (Z01.818) Preop general physical exam  (primary encounter diagnosis)  Comment:   Plan: CBC with platelets, Basic metabolic panel            RECOMMENDATIONS:     --Consult hospital rounder / IM to assist post-op medical management    --Patient is to take all scheduled medications on the day of surgery EXCEPT for modifications listed below.    APPROVAL GIVEN to proceed with proposed procedure, without further diagnostic evaluation       Signed Electronically by: Rocío Phelps NP    Copy of this evaluation report is provided to requesting physician.    [facility] Preop Guidelines    Revised Cardiac Risk Index

## 2019-10-24 NOTE — NURSING NOTE
Prior to injection, verified patient identity using patient's name and date of birth.  Due to injection administration, patient instructed to remain in clinic for 15 minutes afterwards, and to report any adverse reaction to me or the  staff immediately.    Tdap    Drug Amount Wasted:  None.  Vial/Syringe: Single dose vial    Screening Questionnaire for Adult Immunization     Are you sick today?   No    Do you have allergies to medications, food or any vaccine?   No    Have you ever had a serious reaction after receiving a vaccination?   No    Do you have a long-term health problem with heart disease, lung disease,  asthma, kidney disease, diabetes, anemia, metabolic or blood disease?   No    Do you have cancer, leukemia, AIDS, or any immune system problem?   No    Do you take cortisone, prednisone, other steroids, or anticancer drugs, or  have you had any x-ray (radiation) treatments?   No    Have you had a seizure, brain, or other nervous system problem?   No    During the past year, have you received a transfusion of blood or blood       products, or been given a medicine called immune (gamma) globulin?   No    For women: Are you pregnant or is there a chance you could become         pregnant during the next month?   No    Have you received any vaccinations in the past 4 weeks?   No     Immunization questionnaire answers were all negative.           Screening performed by Eleanor Boo MA on 10/24/2019 at 1:46 PM.

## 2019-10-24 NOTE — NURSING NOTE
"/72 (BP Location: Right arm, Patient Position: Sitting, Cuff Size: Adult Regular)   Pulse 98   Temp 98.4  F (36.9  C) (Oral)   Resp 18   Ht 1.753 m (5' 9\")   Wt 80.7 kg (177 lb 14.4 oz)   SpO2 95%   BMI 26.27 kg/m    Patient having surgery for abdominoplasty and tummy tuck on 11/15/2019.  "

## 2019-10-24 NOTE — LETTER
October 28, 2019      Kathi Linn  [address] [facility] [address]        Dear ,    We are writing to inform you of your test results.    Your recent lab results were normal.     Resulted Orders   CBC with platelets   Result Value Ref Range    WBC 6.2 4.0 - 11.0 10e9/L    RBC Count 3.96 3.8 - 5.2 10e12/L    Hemoglobin 12.3 11.7 - 15.7 g/dL    Hematocrit 37.5 35.0 - 47.0 %    MCV 95 78 - 100 fl    MCH 31.1 26.5 - 33.0 pg    MCHC 32.8 31.5 - 36.5 g/dL    RDW 11.4 10.0 - 15.0 %    Platelet Count 252 150 - 450 10e9/L   Basic metabolic panel   Result Value Ref Range    Sodium 139 133 - 144 mmol/L    Potassium 4.2 3.4 - 5.3 mmol/L    Chloride 106 94 - 109 mmol/L    Carbon Dioxide 26 20 - 32 mmol/L    Anion Gap 7 3 - 14 mmol/L    Glucose 79 70 - 99 mg/dL      Comment:      Non Fasting    Urea Nitrogen 11 7 - 30 mg/dL    Creatinine 0.83 0.52 - 1.04 mg/dL    GFR Estimate 85 >60 mL/min/[1.73_m2]      Comment:      Non  GFR Calc  Starting 12/18/2018, serum creatinine based estimated GFR (eGFR) will be   calculated using the Chronic Kidney Disease Epidemiology Collaboration   (CKD-EPI) equation.      GFR Estimate If Black >90 >60 mL/min/[1.73_m2]      Comment:       GFR Calc  Starting 12/18/2018, serum creatinine based estimated GFR (eGFR) will be   calculated using the Chronic Kidney Disease Epidemiology Collaboration   (CKD-EPI) equation.      Calcium 8.6 8.5 - 10.1 mg/dL       If you have any questions or concerns, please call the clinic at the number listed above.       Sincerely,        Rocío Phelps NP

## 2019-10-25 LAB
ANION GAP SERPL CALCULATED.3IONS-SCNC: 7 MMOL/L (ref 3–14)
BUN SERPL-MCNC: 11 MG/DL (ref 7–30)
CALCIUM SERPL-MCNC: 8.6 MG/DL (ref 8.5–10.1)
CHLORIDE SERPL-SCNC: 106 MMOL/L (ref 94–109)
CO2 SERPL-SCNC: 26 MMOL/L (ref 20–32)
CREAT SERPL-MCNC: 0.83 MG/DL (ref 0.52–1.04)
GFR SERPL CREATININE-BSD FRML MDRD: 85 ML/MIN/{1.73_M2}
GLUCOSE SERPL-MCNC: 79 MG/DL (ref 70–99)
POTASSIUM SERPL-SCNC: 4.2 MMOL/L (ref 3.4–5.3)
SODIUM SERPL-SCNC: 139 MMOL/L (ref 133–144)

## 2019-10-25 ASSESSMENT — ASTHMA QUESTIONNAIRES: ACT_TOTALSCORE: 25

## 2019-11-28 ENCOUNTER — NURSE TRIAGE (OUTPATIENT)
Dept: NURSING | Facility: CLINIC | Age: 44
End: 2019-11-28

## 2019-11-28 ENCOUNTER — HOSPITAL ENCOUNTER (EMERGENCY)
Facility: CLINIC | Age: 44
Discharge: HOME | End: 2019-11-29
Attending: EMERGENCY MEDICINE | Admitting: EMERGENCY MEDICINE
Payer: COMMERCIAL

## 2019-11-28 ENCOUNTER — APPOINTMENT (OUTPATIENT)
Dept: CT IMAGING | Facility: CLINIC | Age: 44
End: 2019-11-28
Attending: EMERGENCY MEDICINE
Payer: COMMERCIAL

## 2019-11-28 DIAGNOSIS — R50.82 FEVER POSTOP: ICD-10-CM

## 2019-11-28 LAB
ALBUMIN SERPL-MCNC: 3 G/DL (ref 3.4–5)
ALBUMIN UR-MCNC: 10 MG/DL
ALP SERPL-CCNC: 64 U/L (ref 40–150)
ALT SERPL W P-5'-P-CCNC: 27 U/L (ref 0–50)
ANION GAP SERPL CALCULATED.3IONS-SCNC: 6 MMOL/L (ref 3–14)
APPEARANCE UR: CLEAR
AST SERPL W P-5'-P-CCNC: 14 U/L (ref 0–45)
BACTERIA #/AREA URNS HPF: ABNORMAL /HPF
BASOPHILS # BLD AUTO: 0.1 10E9/L (ref 0–0.2)
BASOPHILS NFR BLD AUTO: 0.3 %
BILIRUB SERPL-MCNC: 1.3 MG/DL (ref 0.2–1.3)
BILIRUB UR QL STRIP: NEGATIVE
BUN SERPL-MCNC: 18 MG/DL (ref 7–30)
CALCIUM SERPL-MCNC: 8.1 MG/DL (ref 8.5–10.1)
CHLORIDE SERPL-SCNC: 106 MMOL/L (ref 94–109)
CO2 BLDCOV-SCNC: 22 MMOL/L (ref 21–28)
CO2 SERPL-SCNC: 25 MMOL/L (ref 20–32)
COLOR UR AUTO: YELLOW
CREAT SERPL-MCNC: 0.7 MG/DL (ref 0.52–1.04)
DIFFERENTIAL METHOD BLD: ABNORMAL
EOSINOPHIL # BLD AUTO: 0 10E9/L (ref 0–0.7)
EOSINOPHIL NFR BLD AUTO: 0.1 %
ERYTHROCYTE [DISTWIDTH] IN BLOOD BY AUTOMATED COUNT: 11.9 % (ref 10–15)
GFR SERPL CREATININE-BSD FRML MDRD: >90 ML/MIN/{1.73_M2}
GLUCOSE SERPL-MCNC: 124 MG/DL (ref 70–99)
GLUCOSE UR STRIP-MCNC: NEGATIVE MG/DL
HCT VFR BLD AUTO: 35.2 % (ref 35–47)
HGB BLD-MCNC: 11.7 G/DL (ref 11.7–15.7)
HGB UR QL STRIP: ABNORMAL
IMM GRANULOCYTES # BLD: 0.1 10E9/L (ref 0–0.4)
IMM GRANULOCYTES NFR BLD: 0.6 %
KETONES UR STRIP-MCNC: NEGATIVE MG/DL
LACTATE BLD-SCNC: 0.8 MMOL/L (ref 0.7–2.1)
LEUKOCYTE ESTERASE UR QL STRIP: NEGATIVE
LYMPHOCYTES # BLD AUTO: 1.2 10E9/L (ref 0.8–5.3)
LYMPHOCYTES NFR BLD AUTO: 5.2 %
MCH RBC QN AUTO: 31.5 PG (ref 26.5–33)
MCHC RBC AUTO-ENTMCNC: 33.2 G/DL (ref 31.5–36.5)
MCV RBC AUTO: 95 FL (ref 78–100)
MONOCYTES # BLD AUTO: 1.2 10E9/L (ref 0–1.3)
MONOCYTES NFR BLD AUTO: 5.4 %
MUCOUS THREADS #/AREA URNS LPF: PRESENT /LPF
NEUTROPHILS # BLD AUTO: 19.5 10E9/L (ref 1.6–8.3)
NEUTROPHILS NFR BLD AUTO: 88.4 %
NITRATE UR QL: NEGATIVE
NRBC # BLD AUTO: 0 10*3/UL
NRBC BLD AUTO-RTO: 0 /100
PCO2 BLDV: 33 MM HG (ref 40–50)
PH BLDV: 7.42 PH (ref 7.32–7.43)
PH UR STRIP: 5.5 PH (ref 5–7)
PLATELET # BLD AUTO: 302 10E9/L (ref 150–450)
PO2 BLDV: 63 MM HG (ref 25–47)
POTASSIUM SERPL-SCNC: 3.5 MMOL/L (ref 3.4–5.3)
PROT SERPL-MCNC: 6.2 G/DL (ref 6.8–8.8)
RBC # BLD AUTO: 3.72 10E12/L (ref 3.8–5.2)
RBC #/AREA URNS AUTO: 4 /HPF (ref 0–2)
SAO2 % BLDV FROM PO2: 92 %
SODIUM SERPL-SCNC: 137 MMOL/L (ref 133–144)
SOURCE: ABNORMAL
SP GR UR STRIP: 1.03 (ref 1–1.03)
SQUAMOUS #/AREA URNS AUTO: 3 /HPF (ref 0–1)
UROBILINOGEN UR STRIP-MCNC: NORMAL MG/DL (ref 0–2)
WBC # BLD AUTO: 22 10E9/L (ref 4–11)
WBC #/AREA URNS AUTO: 1 /HPF (ref 0–5)

## 2019-11-28 PROCEDURE — 99285 EMERGENCY DEPT VISIT HI MDM: CPT | Mod: 25

## 2019-11-28 PROCEDURE — 85025 COMPLETE CBC W/AUTO DIFF WBC: CPT | Performed by: EMERGENCY MEDICINE

## 2019-11-28 PROCEDURE — 96375 TX/PRO/DX INJ NEW DRUG ADDON: CPT

## 2019-11-28 PROCEDURE — 80053 COMPREHEN METABOLIC PANEL: CPT | Performed by: EMERGENCY MEDICINE

## 2019-11-28 PROCEDURE — 82803 BLOOD GASES ANY COMBINATION: CPT

## 2019-11-28 PROCEDURE — 25000128 H RX IP 250 OP 636: Performed by: EMERGENCY MEDICINE

## 2019-11-28 PROCEDURE — 36415 COLL VENOUS BLD VENIPUNCTURE: CPT | Performed by: EMERGENCY MEDICINE

## 2019-11-28 PROCEDURE — 25800030 ZZH RX IP 258 OP 636: Performed by: EMERGENCY MEDICINE

## 2019-11-28 PROCEDURE — 96361 HYDRATE IV INFUSION ADD-ON: CPT

## 2019-11-28 PROCEDURE — 74177 CT ABD & PELVIS W/CONTRAST: CPT

## 2019-11-28 PROCEDURE — 81001 URINALYSIS AUTO W/SCOPE: CPT | Performed by: EMERGENCY MEDICINE

## 2019-11-28 PROCEDURE — 83605 ASSAY OF LACTIC ACID: CPT

## 2019-11-28 PROCEDURE — 25000125 ZZHC RX 250: Performed by: EMERGENCY MEDICINE

## 2019-11-28 PROCEDURE — 87040 BLOOD CULTURE FOR BACTERIA: CPT | Performed by: EMERGENCY MEDICINE

## 2019-11-28 RX ORDER — DIPHENHYDRAMINE HYDROCHLORIDE 50 MG/ML
25 INJECTION INTRAMUSCULAR; INTRAVENOUS ONCE
Status: COMPLETED | OUTPATIENT
Start: 2019-11-28 | End: 2019-11-28

## 2019-11-28 RX ORDER — METOCLOPRAMIDE HYDROCHLORIDE 5 MG/ML
10 INJECTION INTRAMUSCULAR; INTRAVENOUS ONCE
Status: COMPLETED | OUTPATIENT
Start: 2019-11-28 | End: 2019-11-28

## 2019-11-28 RX ORDER — IOPAMIDOL 755 MG/ML
500 INJECTION, SOLUTION INTRAVASCULAR ONCE
Status: COMPLETED | OUTPATIENT
Start: 2019-11-28 | End: 2019-11-28

## 2019-11-28 RX ADMIN — IOPAMIDOL 89 ML: 755 INJECTION, SOLUTION INTRAVENOUS at 22:09

## 2019-11-28 RX ADMIN — SODIUM CHLORIDE 62 ML: 9 INJECTION, SOLUTION INTRAVENOUS at 22:09

## 2019-11-28 RX ADMIN — SODIUM CHLORIDE 1000 ML: 9 INJECTION, SOLUTION INTRAVENOUS at 21:39

## 2019-11-28 RX ADMIN — DIPHENHYDRAMINE HYDROCHLORIDE 25 MG: 50 INJECTION, SOLUTION INTRAMUSCULAR; INTRAVENOUS at 21:44

## 2019-11-28 RX ADMIN — METOCLOPRAMIDE 10 MG: 5 INJECTION, SOLUTION INTRAMUSCULAR; INTRAVENOUS at 21:44

## 2019-11-28 ASSESSMENT — ENCOUNTER SYMPTOMS
FREQUENCY: 0
CHILLS: 1
DIZZINESS: 1
ARTHRALGIAS: 1
SORE THROAT: 0
RHINORRHEA: 0
SHORTNESS OF BREATH: 0
DIARRHEA: 0
DYSURIA: 0
HEADACHES: 1
NAUSEA: 1
CONSTIPATION: 0
FEVER: 1

## 2019-11-28 NOTE — ED AVS SNAPSHOT
[facility] Emergency Department  [address]  [facility] [address]  Phone:  [phone]  Fax:  [phone]                                    Kathi Linn   MRN: [id number]    Department:  [facility] Emergency Department   Date of Visit:  11/28/2019           After Visit Summary Signature Page    I have received my discharge instructions, and my questions have been answered. I have discussed any challenges I see with this plan with the nurse or doctor.    ..........................................................................................................................................  Patient/Patient Representative Signature      ..........................................................................................................................................  Patient Representative Print Name and Relationship to Patient    ..................................................               ................................................  Date                                   Time    ..........................................................................................................................................  Reviewed by Signature/Title    ...................................................              ..............................................  Date                                               Time          22EPIC Rev 08/18

## 2019-11-29 VITALS
OXYGEN SATURATION: 97 % | TEMPERATURE: 99.1 F | DIASTOLIC BLOOD PRESSURE: 62 MMHG | HEART RATE: 95 BPM | RESPIRATION RATE: 18 BRPM | SYSTOLIC BLOOD PRESSURE: 105 MMHG

## 2019-11-29 PROCEDURE — 96365 THER/PROPH/DIAG IV INF INIT: CPT | Mod: 59

## 2019-11-29 PROCEDURE — 25000128 H RX IP 250 OP 636

## 2019-11-29 RX ORDER — AMPICILLIN AND SULBACTAM 2; 1 G/1; G/1
INJECTION, POWDER, FOR SOLUTION INTRAMUSCULAR; INTRAVENOUS
Status: COMPLETED
Start: 2019-11-29 | End: 2019-11-29

## 2019-11-29 RX ORDER — AMPICILLIN AND SULBACTAM 2; 1 G/1; G/1
3 INJECTION, POWDER, FOR SOLUTION INTRAMUSCULAR; INTRAVENOUS ONCE
Status: COMPLETED | OUTPATIENT
Start: 2019-11-29 | End: 2019-11-29

## 2019-11-29 RX ADMIN — AMPICILLIN AND SULBACTAM 3 G: 2; 1 INJECTION, POWDER, FOR SOLUTION INTRAMUSCULAR; INTRAVENOUS at 01:10

## 2019-11-29 RX ADMIN — AMPICILLIN SODIUM AND SULBACTAM SODIUM 3 G: 2; 1 INJECTION, POWDER, FOR SOLUTION INTRAMUSCULAR; INTRAVENOUS at 01:10

## 2019-11-29 NOTE — TELEPHONE ENCOUNTER
Reason for Disposition    [1] Incision looks infected (spreading redness, pain) AND [2] fever > 99.5 F (37.5 C)    Additional Information    Negative: [1] Major abdominal surgical incision AND [2] wound gaping open AND [3] visible internal organs    Negative: Sounds like a life-threatening emergency to the triager    Negative: [1] Bleeding from incision AND [2] won't stop after 10 minutes of direct pressure    Negative: [1] Widespread rash AND [2] bright red, sunburn-like    Negative: Severe pain in the incision    Negative: [1] Incision gaping open AND [2] length of opening > 2 inches (5 cm)    Negative: Patient sounds very sick or weak to the triager    Negative: Sounds like a serious complication to the triager    Negative: [1] Suture came out early AND [2] wound gaping AND [3] < 48 hours since sutures placed    Negative: Fever > 100.5 F (38.1 C)    Protocols used: POST-OP INCISION SYMPTOMS-A-AH

## 2019-11-29 NOTE — TELEPHONE ENCOUNTER
"S: Calling about incisional line and chills.  B: On 11/15 had a tummy tuck at [facility] by Dr. Bass. Today having chills and HA has been dinking more than usual.  Has a GAVIN drain in place.  When she empty it at 1400 the tubing was kinked there was some fluid leaking at the tube insertion site. Driange color has been yellowish and thin.  When empted at 1900 yellowish and thick.  The incision is in the shape of an upside down \"T\"  In the middle of the T the incision is slightly open with yellowish drainage. The rest of the incision is clean dry and intact. Temperature is 100.6 taken orally.  Has completed oral antibiotics.    A: Writer paged on call provider Dr. Bass to call Kathi at home at 2000.  R: Writer instructed Kathi to stay near the phone and off the phone.  If she had not heard back from Dr. Bass by 2030 to call back.  Rayne Wilson RN, [facility]    "

## 2019-11-29 NOTE — ED TRIAGE NOTES
Patient presents to the ED reporting a fever and a change in drainage from wound following a tummy tuck on the 15th. States began feeling unwell today.

## 2019-11-29 NOTE — DISCHARGE INSTRUCTIONS
1. -Take acetaminophen 500 to 1000 mg by mouth every 4 to 6 hours as needed for pain or fever.  Do not take more than 4000 mg in 24 hours.  Do not take within 6 hours of another acetaminophen containing medication such as norco (vicodin) or percocet.  - Take ibuprofen 600 to 800 mg by mouth every 6 to 8 hours as needed for pain or fever  2. Drink plenty of fluids at home.  3.  Please follow-up with your plastic surgeon in person as soon as possible.  4.  Please return to the emergency department as needed for new or worsening symptoms including vomiting and unable to keep anything down, severe uncontrollable pain, persistently high fevers greater than 100.4  F after 48 hours of antibiotics, severe confusion, any other concerning symptoms.

## 2019-12-05 LAB
BACTERIA SPEC CULT: NO GROWTH
BACTERIA SPEC CULT: NO GROWTH
Lab: NORMAL
Lab: NORMAL
SPECIMEN SOURCE: NORMAL
SPECIMEN SOURCE: NORMAL

## 2019-12-06 ENCOUNTER — TELEPHONE (OUTPATIENT)
Dept: INTERNAL MEDICINE | Facility: CLINIC | Age: 44
End: 2019-12-06

## 2019-12-06 ENCOUNTER — HOSPITAL ENCOUNTER (OUTPATIENT)
Dept: ULTRASOUND IMAGING | Facility: CLINIC | Age: 44
Discharge: HOME | End: 2019-12-06
Attending: INTERNAL MEDICINE | Admitting: INTERNAL MEDICINE
Payer: COMMERCIAL

## 2019-12-06 DIAGNOSIS — R60.0 BILATERAL LOWER EXTREMITY EDEMA: Primary | ICD-10-CM

## 2019-12-06 DIAGNOSIS — R60.0 BILATERAL LOWER EXTREMITY EDEMA: ICD-10-CM

## 2019-12-06 PROCEDURE — 93970 EXTREMITY STUDY: CPT

## 2019-12-06 NOTE — TELEPHONE ENCOUNTER
Per Dr. Wright, call radiology at hospital and see if can do bilateral lower extremity ultrasound and they can call him with positive result.  Advised pt if negative can go home and if positive will need to go to ED per Dr. Wright.  U/s ordered and they are expecting her.

## 2019-12-06 NOTE — TELEPHONE ENCOUNTER
Patient is calling because she recently had a tummy [facility] surgery with Dr Jeff. She now has swelling in both of her legs down to her ankles. She called her surgeons office and they told her to call her primary and have them place an order to check for a DVT.

## 2019-12-06 NOTE — TELEPHONE ENCOUNTER
Patient calls again . She states her surgeon is concerned regarding a DVT - please call       KARLA Dietz LPN

## 2019-12-06 NOTE — TELEPHONE ENCOUNTER
Call to patient. States she is wearing a compression garment that goes from her thighs to her shoulders. States she has swelling in her thighs that she was told is normal after the surgery she had. States she believes that the compression garment is compressing the fluid into her lower legs. States she called the surgeon's office and was told that it could be a blood clot. Patient states the swelling was present yesterday but it went away during the night and then has gotten worse again as the day has gone on today. After speaking with patient writer found out that another RN was also working on this encounter. Will close encounter. Further documentation will be per Alejandra Lira RN.

## 2019-12-11 ENCOUNTER — OFFICE VISIT (OUTPATIENT)
Dept: INTERNAL MEDICINE | Facility: CLINIC | Age: 44
End: 2019-12-11
Payer: COMMERCIAL

## 2019-12-11 VITALS
HEART RATE: 72 BPM | OXYGEN SATURATION: 99 % | WEIGHT: 174.5 LBS | SYSTOLIC BLOOD PRESSURE: 120 MMHG | BODY MASS INDEX: 25.77 KG/M2 | DIASTOLIC BLOOD PRESSURE: 80 MMHG

## 2019-12-11 DIAGNOSIS — R60.9 EDEMA, UNSPECIFIED TYPE: Primary | ICD-10-CM

## 2019-12-11 LAB
BASOPHILS # BLD AUTO: 0 10E9/L (ref 0–0.2)
BASOPHILS NFR BLD AUTO: 0.2 %
DIFFERENTIAL METHOD BLD: NORMAL
EOSINOPHIL # BLD AUTO: 0.3 10E9/L (ref 0–0.7)
EOSINOPHIL NFR BLD AUTO: 4 %
ERYTHROCYTE [DISTWIDTH] IN BLOOD BY AUTOMATED COUNT: 12.4 % (ref 10–15)
HCT VFR BLD AUTO: 37.5 % (ref 35–47)
HGB BLD-MCNC: 12 G/DL (ref 11.7–15.7)
LYMPHOCYTES # BLD AUTO: 1.3 10E9/L (ref 0.8–5.3)
LYMPHOCYTES NFR BLD AUTO: 15.8 %
MCH RBC QN AUTO: 30.7 PG (ref 26.5–33)
MCHC RBC AUTO-ENTMCNC: 32 G/DL (ref 31.5–36.5)
MCV RBC AUTO: 96 FL (ref 78–100)
MONOCYTES # BLD AUTO: 0.8 10E9/L (ref 0–1.3)
MONOCYTES NFR BLD AUTO: 9.3 %
NEUTROPHILS # BLD AUTO: 5.8 10E9/L (ref 1.6–8.3)
NEUTROPHILS NFR BLD AUTO: 70.7 %
PLATELET # BLD AUTO: 312 10E9/L (ref 150–450)
RBC # BLD AUTO: 3.91 10E12/L (ref 3.8–5.2)
WBC # BLD AUTO: 8.3 10E9/L (ref 4–11)

## 2019-12-11 PROCEDURE — 84134 ASSAY OF PREALBUMIN: CPT | Performed by: INTERNAL MEDICINE

## 2019-12-11 PROCEDURE — 36415 COLL VENOUS BLD VENIPUNCTURE: CPT | Performed by: INTERNAL MEDICINE

## 2019-12-11 PROCEDURE — 85025 COMPLETE CBC W/AUTO DIFF WBC: CPT | Performed by: INTERNAL MEDICINE

## 2019-12-11 PROCEDURE — 80053 COMPREHEN METABOLIC PANEL: CPT | Performed by: INTERNAL MEDICINE

## 2019-12-11 PROCEDURE — 99214 OFFICE O/P EST MOD 30 MIN: CPT | Performed by: INTERNAL MEDICINE

## 2019-12-11 RX ORDER — POTASSIUM CHLORIDE 1500 MG/1
20 TABLET, EXTENDED RELEASE ORAL 2 TIMES DAILY
Qty: 60 TABLET | Refills: 0 | Status: SHIPPED | OUTPATIENT
Start: 2019-12-11 | End: 2020-09-22

## 2019-12-11 RX ORDER — FUROSEMIDE 20 MG
20 TABLET ORAL 2 TIMES DAILY
Qty: 60 TABLET | Refills: 0 | Status: SHIPPED | OUTPATIENT
Start: 2019-12-11 | End: 2020-09-22

## 2019-12-11 NOTE — PROGRESS NOTES
SUBJECTIVE:                                                      HPI: Kathi Linn is a pleasant 44 year old female who presents with edema:    Patient underwent an abdominoplasty 1 month ago to remove excess skin after weight loss from bariatric surgery. She has had significant lower abdominal and lower extremity edema since surgery and her drain continues to put out high amounts of fluid. Her lower leg edema has nearly resolved since surgery. Upper leg and lower abdominal edema have persisted but are improved. Her surgeon thinks she would benefit from Lasix administration and has requested that her PCP evaluate her edema and treat with Lasix.    No chest pain or palpitations.  No shortness of breath or cough.  No lightheadedness or presyncope.  No fevers or chills.    No active medical problems otherwise.    The medication, allergy, and problem lists have been reviewed and updated as appropriate.     OBJECTIVE:                                                      /80   Pulse 72   Wt 79.2 kg (174 lb 8 oz)   SpO2 99%   BMI 25.77 kg/m    Constitutional: well-appearing  Respiratory: normal respiratory effort; clear to auscultation bilaterally  Cardiovascular: regular rate and rhythm; bilateral, symmetric, upper leg, nonpitting edema  Gastrointestinal: s/p abdominoplasty; drain in place; incision otherwise clean, dry, and intact; appropriately tender to palpation    ASSESSMENT/PLAN:                                                      (R60.9) Edema, unspecified type  (primary encounter diagnosis)  Comment: patient has postoperative edema ongoing for a month, though it is slowly improving on its own.   Plan:    - CBC, CMP, prealbumin today to evaluate for organic causes of edema.   - START Lasix 20 mg daily.   - may increase Lasix to twice daily after several days.   - follow-up in 7 to 10 days for edema check and BMP.    The instructions on the AVS were discussed and explained to the patient. Patient  expressed understanding of instructions.    (Chart documentation was completed, in part, with [employer] voice-recognition software. Even though reviewed, some grammatical, spelling, and word errors may remain.)    Estrella Marino MD   [address] [address]  T: [phone], F: [phone]

## 2019-12-11 NOTE — PATIENT INSTRUCTIONS
Labs today.    ---    START Lasix 20mg once daily for several days.     If doing okay after several days, then increase to twice a day (morning, early afternoon).    ---    Take potassium supplement every time you take Lasix.    ---    Follow-up with me or Dr. Clement in 7-10 days for edema check and labs.

## 2019-12-12 LAB
ALBUMIN SERPL-MCNC: 3 G/DL (ref 3.4–5)
ALP SERPL-CCNC: 66 U/L (ref 40–150)
ALT SERPL W P-5'-P-CCNC: 50 U/L (ref 0–50)
ANION GAP SERPL CALCULATED.3IONS-SCNC: 9 MMOL/L (ref 3–14)
AST SERPL W P-5'-P-CCNC: 20 U/L (ref 0–45)
BILIRUB SERPL-MCNC: 0.7 MG/DL (ref 0.2–1.3)
BUN SERPL-MCNC: 19 MG/DL (ref 7–30)
CALCIUM SERPL-MCNC: 8.3 MG/DL (ref 8.5–10.1)
CHLORIDE SERPL-SCNC: 107 MMOL/L (ref 94–109)
CO2 SERPL-SCNC: 23 MMOL/L (ref 20–32)
CREAT SERPL-MCNC: 0.67 MG/DL (ref 0.52–1.04)
GFR SERPL CREATININE-BSD FRML MDRD: >90 ML/MIN/{1.73_M2}
GLUCOSE SERPL-MCNC: 77 MG/DL (ref 70–99)
POTASSIUM SERPL-SCNC: 4 MMOL/L (ref 3.4–5.3)
PREALB SERPL IA-MCNC: 30 MG/DL (ref 15–45)
PROT SERPL-MCNC: 6.4 G/DL (ref 6.8–8.8)
SODIUM SERPL-SCNC: 139 MMOL/L (ref 133–144)

## 2019-12-15 ENCOUNTER — HEALTH MAINTENANCE LETTER (OUTPATIENT)
Age: 44
End: 2019-12-15

## 2019-12-19 ENCOUNTER — OFFICE VISIT (OUTPATIENT)
Dept: INTERNAL MEDICINE | Facility: CLINIC | Age: 44
End: 2019-12-19
Payer: COMMERCIAL

## 2019-12-19 VITALS
DIASTOLIC BLOOD PRESSURE: 78 MMHG | BODY MASS INDEX: 25.34 KG/M2 | RESPIRATION RATE: 16 BRPM | OXYGEN SATURATION: 98 % | HEART RATE: 70 BPM | SYSTOLIC BLOOD PRESSURE: 116 MMHG | WEIGHT: 171.6 LBS

## 2019-12-19 DIAGNOSIS — R60.9 EDEMA, UNSPECIFIED TYPE: Primary | ICD-10-CM

## 2019-12-19 LAB
ANION GAP SERPL CALCULATED.3IONS-SCNC: 5 MMOL/L (ref 3–14)
BUN SERPL-MCNC: 20 MG/DL (ref 7–30)
CALCIUM SERPL-MCNC: 8.7 MG/DL (ref 8.5–10.1)
CHLORIDE SERPL-SCNC: 107 MMOL/L (ref 94–109)
CO2 SERPL-SCNC: 25 MMOL/L (ref 20–32)
CREAT SERPL-MCNC: 0.66 MG/DL (ref 0.52–1.04)
GFR SERPL CREATININE-BSD FRML MDRD: >90 ML/MIN/{1.73_M2}
GLUCOSE SERPL-MCNC: 88 MG/DL (ref 70–99)
POTASSIUM SERPL-SCNC: 4.3 MMOL/L (ref 3.4–5.3)
SODIUM SERPL-SCNC: 137 MMOL/L (ref 133–144)

## 2019-12-19 PROCEDURE — 36415 COLL VENOUS BLD VENIPUNCTURE: CPT | Performed by: INTERNAL MEDICINE

## 2019-12-19 PROCEDURE — 99213 OFFICE O/P EST LOW 20 MIN: CPT | Performed by: INTERNAL MEDICINE

## 2019-12-19 PROCEDURE — 80048 BASIC METABOLIC PNL TOTAL CA: CPT | Performed by: INTERNAL MEDICINE

## 2019-12-19 NOTE — PROGRESS NOTES
SUBJECTIVE:                                                      HPI: Kathi Linn is a pleasant 44 year old female who presents for follow-up:    Patient was seen last week for postop edema (lower abdomen, bilateral upper legs).    Labs were generally unremarkable other than mildly decreased albumin (3.0) and patient was started on Lasix 20 mg daily. No significant response to Lasix 20 mg daily, so dose was increased to 40 mg daily. Patient is tolerating medication well-no adverse side effects. Patient reports improvement in but continued postop edema.    Patient's abdominoplasty drain has been removed due to decreased output son starting Lasix.    Patient has lost 3 pounds since last visit. Probably ~10 pounds still from baseline weight.    The medication, allergy, and problem lists have been reviewed and updated as appropriate.     OBJECTIVE:                                                      /78   Pulse 70   Resp 16   Wt 77.8 kg (171 lb 9.6 oz)   SpO2 98%   BMI 25.34 kg/m    Constitutional: well-appearing    Mild, bilateral, symmetric, upper leg, nonpitting edema - improved from last visit.    ASSESSMENT/PLAN:                                                      (R60.9) Edema, unspecified type  (primary encounter diagnosis)  Comment: Improved with the addition of Lasix.  Plan:    - BMP today.   - provided BMP is within normal limits, continue Lasix until edema has resolved (another 1-2 weeks).     The instructions on the AVS were discussed and explained to the patient. Patient expressed understanding of instructions.    (Chart documentation was completed, in part, with [employer] voice-recognition software. Even though reviewed, some grammatical, spelling, and word errors may remain.)    Estrella Marino MD   [address] [address]  T: [phone], F: [phone]

## 2020-07-13 ENCOUNTER — ANCILLARY PROCEDURE (OUTPATIENT)
Dept: MAMMOGRAPHY | Facility: CLINIC | Age: 45
End: 2020-07-13
Payer: COMMERCIAL

## 2020-07-13 DIAGNOSIS — Z12.31 VISIT FOR SCREENING MAMMOGRAM: ICD-10-CM

## 2020-07-13 PROCEDURE — 77063 BREAST TOMOSYNTHESIS BI: CPT | Mod: TC

## 2020-07-13 PROCEDURE — 77067 SCR MAMMO BI INCL CAD: CPT | Mod: TC

## 2020-09-16 ENCOUNTER — E-VISIT (OUTPATIENT)
Dept: INTERNAL MEDICINE | Facility: CLINIC | Age: 45
End: 2020-09-16
Payer: COMMERCIAL

## 2020-09-16 DIAGNOSIS — R21 RASH: Primary | ICD-10-CM

## 2020-09-16 DIAGNOSIS — L29.9 ITCHING: ICD-10-CM

## 2020-09-16 PROCEDURE — 99207 ZZC NON-BILLABLE SERV PER CHARTING: CPT | Performed by: INTERNAL MEDICINE

## 2020-09-28 ENCOUNTER — VIRTUAL VISIT (OUTPATIENT)
Dept: INTERNAL MEDICINE | Facility: CLINIC | Age: 45
End: 2020-09-28
Payer: COMMERCIAL

## 2020-09-28 ENCOUNTER — TELEPHONE (OUTPATIENT)
Dept: DERMATOLOGY | Facility: CLINIC | Age: 45
End: 2020-09-28

## 2020-09-28 ENCOUNTER — OFFICE VISIT (OUTPATIENT)
Dept: FAMILY MEDICINE | Facility: CLINIC | Age: 45
End: 2020-09-28
Payer: COMMERCIAL

## 2020-09-28 VITALS — DIASTOLIC BLOOD PRESSURE: 76 MMHG | SYSTOLIC BLOOD PRESSURE: 122 MMHG

## 2020-09-28 DIAGNOSIS — Z53.9 ERRONEOUS ENCOUNTER--DISREGARD: Primary | ICD-10-CM

## 2020-09-28 DIAGNOSIS — L30.9 ECZEMA, UNSPECIFIED TYPE: Primary | ICD-10-CM

## 2020-09-28 DIAGNOSIS — L21.9 SEBORRHEIC DERMATITIS: ICD-10-CM

## 2020-09-28 LAB
KOH PREP SPEC: NORMAL
SPECIMEN SOURCE: NORMAL

## 2020-09-28 PROCEDURE — 87220 TISSUE EXAM FOR FUNGI: CPT | Performed by: FAMILY MEDICINE

## 2020-09-28 PROCEDURE — 99214 OFFICE O/P EST MOD 30 MIN: CPT | Performed by: FAMILY MEDICINE

## 2020-09-28 RX ORDER — KETOCONAZOLE 20 MG/ML
SHAMPOO TOPICAL
Qty: 120 ML | Refills: 3 | Status: SHIPPED | OUTPATIENT
Start: 2020-09-28 | End: 2020-12-22

## 2020-09-28 RX ORDER — FLUOCINOLONE ACETONIDE 0.1 MG/ML
SOLUTION TOPICAL
Qty: 60 ML | Refills: 3 | Status: SHIPPED | OUTPATIENT
Start: 2020-09-28 | End: 2021-05-12

## 2020-09-28 RX ORDER — TRIAMCINOLONE ACETONIDE 1 MG/G
CREAM TOPICAL 2 TIMES DAILY
Qty: 80 G | Refills: 0 | Status: SHIPPED | OUTPATIENT
Start: 2020-09-28 | End: 2021-05-12

## 2020-09-28 NOTE — PROGRESS NOTES
[facility] - PRIMARY CARE SKIN    CC: rash on hand and neck  SUBJECTIVE:   Kathi Linn is a(n) 45 year old female who presents to clinic today for rash on the left thumb,right lateral neck itchy, She has found the rash is ensitive to some moisturizer.No other areas involved with a rash.  Duration : 2 months.   She has had itching and scaling on the scalp. Treated scalp issue with antidandruff shampoo.     Personal Medical History  Skin Cancer: NO  Eczema Psoriasis Lupus   yes NO NO   Other: asthma  .    Family Medical History  Skin Cancer: GM non melanoma  Eczema Psoriasis Lupus   NO NO NO   Other:       .    Occupation: indoor ().    Refer to electronic medical record (EMR) for past medical history and medications.      ROS: 14 point review of systems was negative except the symptoms listed above in the HPI.        OBJECTIVE:   GENERAL: healthy, alert and no distress.  HEENT: PERRL. Conjunctiva, sclera clear.  SKIN: Oliver Skin Type - II.  Arms and Hands, scalp  examined. The dermatoscope was used to help evaluate pigmented lesions.  Skin Pertinent Findings:  Right side of neck- annular shaped erythematous, scaly patch-4 cm X 3 cm   Left side of thumb- scaly, erythematous eruption  Scalp : thick scales on the vertex of the scalp, patch of erythema on the occipital scalp  Nails- no pitting      Diagnostic Test Results:  Results for orders placed or performed in visit on 09/28/20 (from the past 24 hour(s))   KOH skin hair or nails    Specimen: Skin   Result Value Ref Range    Specimen Description Skin     KOH Skin Hair Nails Test No fungal elements seen        ASSESSMENT:     Encounter Diagnoses   Name Primary?     Eczema, unspecified type Yes     Seborrheic dermatitis      MDM:   .    PLAN:   Patient Instructions   Eczema     cerave in jar apply daily       Triamcinolone 0.1% cream apply to affected areas two times per day for 10-14 days    Scalp -    Ketonazole shampoo 2% use two times per week    Fluocinonide  solution 0.1 % apply to Affected areas on the scalp once per day for 7 days then when needed    Recheck in 8 weeks      TT: 20   minutes.

## 2020-09-28 NOTE — LETTER
9/28/2020         RE: Kathi Linn  [address] [facility] [facility] [address] [facility] [address]        Dear Colleague,    Thank you for referring your patient, Kathi Linn, to the [facility]. Please see a copy of my visit note below.    [facility] - PRIMARY CARE SKIN    CC: rash on hand and neck  SUBJECTIVE:   Kathi Linn is a(n) 45 year old female who presents to clinic today for rash on the left thumb,right lateral neck itchy, She has found the rash is ensitive to some moisturizer.No other areas involved with a rash.  Duration : 2 months.   She has had itching and scaling on the scalp. Treated scalp issue with antidandruff shampoo.     Personal Medical History  Skin Cancer: NO  Eczema Psoriasis Lupus   yes NO NO   Other: asthma  .    Family Medical History  Skin Cancer: GM non melanoma  Eczema Psoriasis Lupus   NO NO NO   Other:       .    Occupation: indoor ().    Refer to electronic medical record (EMR) for past medical history and medications.      ROS: 14 point review of systems was negative except the symptoms listed above in the HPI.        OBJECTIVE:   GENERAL: healthy, alert and no distress.  HEENT: PERRL. Conjunctiva, sclera clear.  SKIN: Oliver Skin Type - II.  Arms and Hands, scalp  examined. The dermatoscope was used to help evaluate pigmented lesions.  Skin Pertinent Findings:  Right side of neck- annular shaped erythematous, scaly patch-4 cm X 3 cm   Left side of thumb- scaly, erythematous eruption  Scalp : thick scales on the vertex of the scalp, patch of erythema on the occipital scalp  Nails- no pitting      Diagnostic Test Results:  Results for orders placed or performed in visit on 09/28/20 (from the past 24 hour(s))   KOH skin hair or nails    Specimen: Skin   Result Value Ref Range    Specimen Description Skin     KOH Skin Hair Nails Test No fungal elements seen        ASSESSMENT:     Encounter Diagnoses   Name Primary?     Eczema, unspecified type Yes      Seborrheic dermatitis      MDM:   .    PLAN:   Patient Instructions   Eczema     cerave in jar apply daily       Triamcinolone 0.1% cream apply to affected areas two times per day for 10-14 days    Scalp -    Ketonazole shampoo 2% use two times per week   Fluocinonide  solution 0.1 % apply to Affected areas on the scalp once per day for 7 days then when needed    Recheck in 8 weeks      TT: 20   minutes.      Again, thank you for allowing me to participate in the care of your patient.        Sincerely,        Cesia Shafer MD

## 2020-09-28 NOTE — PATIENT INSTRUCTIONS
Eczema     cerave in jar apply daily       Triamcinolone 0.1% cream apply to affected areas two times per day for 10-14 days    Scalp -    Ketonazole shampoo 2% use two times per week   Fluocinonide  solution 0.1 % apply to Affected areas on the scalp once per day for 7 days then when needed    Recheck in 8 weeks

## 2020-09-28 NOTE — PROGRESS NOTES
Cancelled appointment. Video visit was not working.  Patient will call dermatology, as she did not see fundfindrt message until after scheduling this appointment

## 2020-09-29 ASSESSMENT — ASTHMA QUESTIONNAIRES: ACT_TOTALSCORE: 25

## 2020-09-29 NOTE — TELEPHONE ENCOUNTER
----- Message from Cesia Shafer MD sent at 9/28/2020 12:56 PM CDT -----  Please call Kathi,     Let her know that her fungal test was negative.     THank you,   Cesia Shafer M.D.  
Called and LM for patient to call back in regards to fungal test results.    MIKE Ford-BSN-PHN  [facility]  [phone]    Patient called back to talk to dermatology regarding test results please call [phone]   
Called and spoke to patient.    Informed patient her fungal test came back negative. Patient reports a/a is improving.    Patient voiced understanding.    MIKE Ford-BSN-PHN  [facility]  [phone]  
Danger to self

## 2020-12-09 ENCOUNTER — TELEPHONE (OUTPATIENT)
Dept: FAMILY MEDICINE | Facility: CLINIC | Age: 45
End: 2020-12-09

## 2020-12-10 ENCOUNTER — VIRTUAL VISIT (OUTPATIENT)
Dept: FAMILY MEDICINE | Facility: CLINIC | Age: 45
End: 2020-12-10
Payer: COMMERCIAL

## 2020-12-10 DIAGNOSIS — L21.9 SEBORRHEIC DERMATITIS: Primary | ICD-10-CM

## 2020-12-10 PROCEDURE — 99213 OFFICE O/P EST LOW 20 MIN: CPT | Mod: 95 | Performed by: FAMILY MEDICINE

## 2020-12-10 RX ORDER — BETAMETHASONE DIPROPIONATE 0.5 MG/ML
LOTION, AUGMENTED TOPICAL
Qty: 60 ML | Refills: 1 | Status: SHIPPED | OUTPATIENT
Start: 2020-12-10 | End: 2022-02-03

## 2020-12-10 NOTE — PATIENT INSTRUCTIONS
Augmented betamethasone diproprinate 0.05% -apply to scalp once per day for 7 days then use only if needed    Recheck in the office in 2 weeks     Stop the ketonazole shampoo 2%     SHAMPOO INSTRUCTIONS    Use one of these OTC (over-the-counter) anti-dandruff shampoos.    Consider alternating use of shampoos with different active ingredients every 4 week(s) (active ingredients underlined):  Coal Tar shampoos : Neutrogena T/Gel,  Medicated Conditioning Coal Tar Formula Shampoo, Denorex, DHS Tar, Ionil-T, Tegrin, X-Seb T, Zetar  Zinc Pyrithione shampoos : Head & Shoulders, Denorex Advance Formula, DHS Zinc, Zincon  Salicylic Acid shampoos : Neutrogena T/Sal, Rugby Sebex Shampoo, Dermarest Psoriasis Medicated Shampoo Plus Conditioner, DHS Sal, Ionil, P&S, Sebulex, X-Seb  Selenium Sulfide shampoos : Selsun Blue shampoo  Sulfur shampoos : Sebulex  Ketoconazole : Nizoral 1%

## 2020-12-10 NOTE — LETTER
"    12/10/2020         RE: Kathi Linn  [address] [address] [facility] [address]        Dear Colleague,    Thank you for referring your patient, Kathi Linn, to the [facility]. Please see a copy of my visit note below.    Kathi Linn is a 45 year old female  who is being evaluated via a billable video visit.    The patient has been notified of following:   \"This video visit will be conducted via a call between you and your physician/provider. We have found that certain health care needs can be provided without the need for an in-person physical exam.  This service lets us provide the care you need with a video conversation.  If a prescription is necessary we can send it directly to your pharmacy.  If lab work is needed we can place an order for that and you can then stop by our lab to have the test done at a later time.  Video visits are billed at different rates depending on your insurance coverage.  Please reach out to your insurance provider with any questions.  If during the course of the call the physician/provider feels a video visit is not appropriate, you will not be charged for this service.\"  Patient has given verbal consent for Video visit? Yes  How would you like to obtain your AVS? [facility]  Patient would like the video invitation sent by: Text to cell phone: [phone]  Teledermatology information:  - Location of patient: home  - Location of teledermatologist:  home  - Reason teledermatology is appropriate: of National Emergency Regarding Coronavirus disease (COVID 19) Outbreak  - The patient's condition can safely be assessed using telemedicine: yes  - Method of transmission: store and forward teledermatology  - Image quality and interpretability: limited  - Physician has received verbal consent for a Video/Photos Visit from the patient? Yes  - In-person dermatology visit recommendation: yes - for physician visit  - Date of images: none  - Service start " "time:9:30am  - Service end time:9:58   - Date of report: December 10, 2020    [facility] - PRIMARY CARE SKIN    CC: rash on hand and neck  SUBJECTIVE:   Kathi Linn is a(n) 45 year old female who presents to clinic today for follow up eczema and seborrheic dermatitis     Scalp is still itching , shampoo helped but still the scalp still feels \"dry \" . The rash at the base of the posterior hairline was irritated by the ketonazole shampoo 2% .Using medicated shampoo once per week.  Duration : 2 months.   Current treatment :  Triamcinolone 0.1% cream bid to base of neck  , scalp-ketonazole shampoo 2% and fluocinonide solution 0.1 %      Personal Medical History  Skin Cancer: NO  Eczema Psoriasis Lupus   yes NO NO   Other: asthma  .    Family Medical History  Skin Cancer: GM non melanoma  Eczema Psoriasis Lupus   NO NO NO   Other:       .    Occupation: indoor ().    Refer to electronic medical record (EMR) for past medical history and medications.  ROS: 14 point review of systems was negative except the symptoms listed above in the HPI.  OBJECTIVE:   GENERAL: healthy, alert and no distress.  HEENT: PERRL. Conjunctiva, sclera clear.  SKIN: Oliver Skin Type - II.  Arms and Hands, scalp  examined. The dermatoscope was used to help evaluate pigmented lesions.  Skin Pertinent Findings:  No digital photos      Diagnostic Test Results:  No results found for this or any previous visit (from the past 24 hour(s)).    ASSESSMENT:     Encounter Diagnosis   Name Primary?     Seborrheic dermatitis Yes     MDM: she has noticed some \" thinning \" of her hair prior to the scalp symptoms, will evaluate that issue further at in office visit  .    PLAN:   Patient Instructions   Augmented betamethasone diproprinate 0.05% -apply to scalp once per day for 7 days then use only if needed    Recheck in the office in 2 weeks     Stop the ketonazole shampoo 2%     SHAMPOO INSTRUCTIONS    Use one of these OTC (over-the-counter) " anti-dandruff shampoos.    Consider alternating use of shampoos with different active ingredients every 4 week(s) (active ingredients underlined):  Coal Tar shampoos : Neutrogena T/Gel,  Medicated Conditioning Coal Tar Formula Shampoo, Denorex, DHS Tar, Ionil-T, Tegrin, X-Seb T, Zetar  Zinc Pyrithione shampoos : Head & Shoulders, Denorex Advance Formula, DHS Zinc, Zincon  Salicylic Acid shampoos : Neutrogena T/Sal, Rugby Sebex Shampoo, Dermarest Psoriasis Medicated Shampoo Plus Conditioner, DHS Sal, Ionil, P&S, Sebulex, X-Seb  Selenium Sulfide shampoos : Selsun Blue shampoo  Sulfur shampoos : Sebulex  Ketoconazole : Nizoral 1%        TT: 20   minutes.        Again, thank you for allowing me to participate in the care of your patient.        Sincerely,        Cesia Shafer MD

## 2020-12-10 NOTE — PROGRESS NOTES
"Kathi Linn is a 45 year old female  who is being evaluated via a billable video visit.    The patient has been notified of following:   \"This video visit will be conducted via a call between you and your physician/provider. We have found that certain health care needs can be provided without the need for an in-person physical exam.  This service lets us provide the care you need with a video conversation.  If a prescription is necessary we can send it directly to your pharmacy.  If lab work is needed we can place an order for that and you can then stop by our lab to have the test done at a later time.  Video visits are billed at different rates depending on your insurance coverage.  Please reach out to your insurance provider with any questions.  If during the course of the call the physician/provider feels a video visit is not appropriate, you will not be charged for this service.\"  Patient has given verbal consent for Video visit? Yes  How would you like to obtain your AVS? Johan  Patient would like the video invitation sent by: Text to cell phone: [phone]  Teledermatology information:  - Location of patient: home  - Location of teledermatologist:  home  - Reason teledermatology is appropriate: of National Emergency Regarding Coronavirus disease (COVID 19) Outbreak  - The patient's condition can safely be assessed using telemedicine: yes  - Method of transmission: store and forward teledermatology  - Image quality and interpretability: limited  - Physician has received verbal consent for a Video/Photos Visit from the patient? Yes  - In-person dermatology visit recommendation: yes - for physician visit  - Date of images: none  - Service start time:9:30am  - Service end time:9:58   - Date of report: December 10, 2020    [facility] - PRIMARY CARE SKIN    CC: rash on hand and neck  SUBJECTIVE:   Kathi Linn is a(n) 45 year old female who presents to clinic today for follow up eczema and " "seborrheic dermatitis     Scalp is still itching , shampoo helped but still the scalp still feels \"dry \" . The rash at the base of the posterior hairline was irritated by the ketonazole shampoo 2% .Using medicated shampoo once per week.  Duration : 2 months.   Current treatment :  Triamcinolone 0.1% cream bid to base of neck  , scalp-ketonazole shampoo 2% and fluocinonide solution 0.1 %      Personal Medical History  Skin Cancer: NO  Eczema Psoriasis Lupus   yes NO NO   Other: asthma  .    Family Medical History  Skin Cancer: GM non melanoma  Eczema Psoriasis Lupus   NO NO NO   Other:       .    Occupation: indoor ().    Refer to electronic medical record (EMR) for past medical history and medications.  ROS: 14 point review of systems was negative except the symptoms listed above in the HPI.  OBJECTIVE:   GENERAL: healthy, alert and no distress.  HEENT: PERRL. Conjunctiva, sclera clear.  SKIN: Oliver Skin Type - II.  Arms and Hands, scalp  examined. The dermatoscope was used to help evaluate pigmented lesions.  Skin Pertinent Findings:  No digital photos      Diagnostic Test Results:  No results found for this or any previous visit (from the past 24 hour(s)).    ASSESSMENT:     Encounter Diagnosis   Name Primary?     Seborrheic dermatitis Yes     MDM: she has noticed some \" thinning \" of her hair prior to the scalp symptoms, will evaluate that issue further at in office visit  .    PLAN:   Patient Instructions   Augmented betamethasone diproprinate 0.05% -apply to scalp once per day for 7 days then use only if needed    Recheck in the office in 2 weeks     Stop the ketonazole shampoo 2%     SHAMPOO INSTRUCTIONS    Use one of these OTC (over-the-counter) anti-dandruff shampoos.    Consider alternating use of shampoos with different active ingredients every 4 week(s) (active ingredients underlined):  Coal Tar shampoos : Neutrogena T/Gel,  Medicated Conditioning Coal Tar Formula Shampoo, Denorex, DHS Tar, " Ionil-T, Tegrin, X-Seb T, Zetar  Zinc Pyrithione shampoos : Head & Shoulders, Denorex Advance Formula, DHS Zinc, Zincon  Salicylic Acid shampoos : Neutrogena T/Sal, Rugby Sebex Shampoo, Dermarest Psoriasis Medicated Shampoo Plus Conditioner, DHS Sal, Ionil, P&S, Sebulex, X-Seb  Selenium Sulfide shampoos : Selsun Blue shampoo  Sulfur shampoos : Sebulex  Ketoconazole : Nizoral 1%        TT: 20   minutes.

## 2020-12-20 NOTE — PROGRESS NOTES
[facility] - PRIMARY CARE SKIN    CC: rash on hand and neck  SUBJECTIVE:   Kathi Linn is a(n) 45 year old female who presents to clinic today for follow up eczema and seborrheic dermatitis . Her previous visit was virtual .     Current treatment :  Augmented betamethasone diproprinate 0.05% solution - not used for 2 days because symptoms has improved  Response to treatment : itchy is resolved, scaling has resolved     Personal Medical History  Skin Cancer: NO  Eczema Psoriasis Lupus   yes NO NO   Other: asthma  .    Family Medical History  Skin Cancer: GM non melanoma  Eczema Psoriasis Lupus   NO NO NO   Other:         Occupation: indoor ().    Refer to electronic medical record (EMR) for past medical history and medications.  ROS: 14 point review of systems was negative except the symptoms listed above in the HPI.  OBJECTIVE:   GENERAL: healthy, alert and no distress.  HEENT: PERRL. Conjunctiva, sclera clear.  SKIN: Oliver Skin Type - II.  Arms and Hands, scalp  examined. The dermatoscope was used to help evaluate pigmented lesions.  Skin Pertinent Findings:  Scalp : patchy hair loss , diffuse but most prominent on vertex, right and left parietal scalp, no erythema and no induration ? Telogen effluvium ? Lichen planopilaris ? Female pattern hair loss ? other   Hair extensions are present    Diagnostic Test Results:  No results found for this or any previous visit (from the past 24 hour(s)).    ASSESSMENT:     Encounter Diagnosis   Name Primary?     Hair loss Yes         PLAN:   Patient Instructions   FUTURE APPOINTMENTS  Schedule for virtual recheck appointment in 2 weeks  Continue with current betamethasone diproprinate 0.05% solution  Follow up in 7-10 days for suture removal with the nurse. You may go to any [place] clinic, but if you go elsewhere, make sure to call ahead of time to get on their nurse schedule.      WOUND CARE INSTRUCTIONS  1. Wash hands before every dressing change.  2.  "Change the dressing after 24 hours and once daily, or earlier if it becomes saturated.  3. Wash the wound area with a mild soap, then rinse.  4. Gently pat dry with a sterile gauze or Q-tip.  5. Using a Q-tip, apply Vaseline or Aquaphor only over entire wound. DO NOT use Neosporin - as many people react to neomycin.  6. Finally, cover with a bandage or sterile non-stick gauze with micropore paper tape.  7. Repeat once daily until wound has healed.      Soap, water and shampoo will not hurt this area.    Do not go swimming or take baths, but showering is encouraged.    Limit use of the area where the procedure was done for a few days to allow for optimal healing.    Signs of Infection:  Infection can occur in any area where skin has been disrupted. If you notice persistent redness, swelling, colored drainage, increasing pain, fever or other signs of infection, please call us at: [phone] and ask to have me or my colleague paged. We will call you back to discuss.    If you experience bleeding:  Wash hands and hold firm pressure on the area for 10 minutes without checking to see if the bleeding has stopped. \"Checking\" pulls off the protective wound clot and restarts the bleeding all over again. Re-apply pressure for 10 minutes if necessary to stop bleeding.  Use additional sterile gauze and tape to maintain pressure once bleeding has stopped.  If bleeding continues, then call back to clinic at [phone].    PATIENT INFORMATION : WOUNDS  During the healing process you will notice a number of changes.     All wounds normally drain.  The larger the wound the more drainage there will be.  After 7-10 days, you will notice the wound beginning to shrink and new skin will begin to grow.  The wound is healed when you can see that skin has formed over the entire area.  A healed wound has a healthy, shiny look to the surface and is red to dark pink in color to normalize.  Wounds may take approximately 4-6 weeks to " heal.  Larger wounds may take 6-8 weeks. After the wound is healed you may discontinue dressing changes.    All wounds develop a small halo of redness surrounding the wound which means that healing is occurring. Severe itching with extensive redness usually indicates sensitivity to the ointment or bandage tape used to dress the wound.  You should call our office if severe itching with extensive redness develops.    Swelling  and/or discoloration around your surgical site is common, particularly when performed around the eye.  You may experience a sensation of tightness as your wound heals. This is normal and will gradually subside.  Your healed wound may be sensitive to temperature changes. This sensitivity improves with time, but if you re having a lot of discomfort, try to avoid temperature extremes.  Patients frequently experience itching after their wound appears to have healed because of the continue healing under the skin.  Plain Vaseline will help relieve the itching.          TT: 20   minutes.

## 2020-12-22 ENCOUNTER — OFFICE VISIT (OUTPATIENT)
Dept: FAMILY MEDICINE | Facility: CLINIC | Age: 45
End: 2020-12-22
Payer: COMMERCIAL

## 2020-12-22 VITALS — SYSTOLIC BLOOD PRESSURE: 110 MMHG | DIASTOLIC BLOOD PRESSURE: 80 MMHG

## 2020-12-22 DIAGNOSIS — L65.9 HAIR LOSS: Primary | ICD-10-CM

## 2020-12-22 LAB
BASOPHILS # BLD AUTO: 0 10E9/L (ref 0–0.2)
BASOPHILS NFR BLD AUTO: 0.1 %
DIFFERENTIAL METHOD BLD: NORMAL
EOSINOPHIL # BLD AUTO: 0.2 10E9/L (ref 0–0.7)
EOSINOPHIL NFR BLD AUTO: 2.5 %
ERYTHROCYTE [DISTWIDTH] IN BLOOD BY AUTOMATED COUNT: 12.5 % (ref 10–15)
HCT VFR BLD AUTO: 38.3 % (ref 35–47)
HGB BLD-MCNC: 12.4 G/DL (ref 11.7–15.7)
LYMPHOCYTES # BLD AUTO: 2.3 10E9/L (ref 0.8–5.3)
LYMPHOCYTES NFR BLD AUTO: 27.2 %
MCH RBC QN AUTO: 30.5 PG (ref 26.5–33)
MCHC RBC AUTO-ENTMCNC: 32.4 G/DL (ref 31.5–36.5)
MCV RBC AUTO: 94 FL (ref 78–100)
MONOCYTES # BLD AUTO: 0.9 10E9/L (ref 0–1.3)
MONOCYTES NFR BLD AUTO: 10.1 %
NEUTROPHILS # BLD AUTO: 5.1 10E9/L (ref 1.6–8.3)
NEUTROPHILS NFR BLD AUTO: 60.1 %
PLATELET # BLD AUTO: 273 10E9/L (ref 150–450)
RBC # BLD AUTO: 4.07 10E12/L (ref 3.8–5.2)
WBC # BLD AUTO: 8.5 10E9/L (ref 4–11)

## 2020-12-22 PROCEDURE — 82728 ASSAY OF FERRITIN: CPT | Performed by: FAMILY MEDICINE

## 2020-12-22 PROCEDURE — 86038 ANTINUCLEAR ANTIBODIES: CPT | Performed by: FAMILY MEDICINE

## 2020-12-22 PROCEDURE — 80050 GENERAL HEALTH PANEL: CPT | Performed by: FAMILY MEDICINE

## 2020-12-22 PROCEDURE — 11104 PUNCH BX SKIN SINGLE LESION: CPT | Performed by: FAMILY MEDICINE

## 2020-12-22 PROCEDURE — 88305 TISSUE EXAM BY PATHOLOGIST: CPT | Performed by: DERMATOLOGY

## 2020-12-22 PROCEDURE — 36415 COLL VENOUS BLD VENIPUNCTURE: CPT | Performed by: FAMILY MEDICINE

## 2020-12-22 PROCEDURE — 11105 PUNCH BX SKIN EA SEP/ADDL: CPT | Performed by: FAMILY MEDICINE

## 2020-12-22 PROCEDURE — 82306 VITAMIN D 25 HYDROXY: CPT | Performed by: FAMILY MEDICINE

## 2020-12-22 PROCEDURE — 99213 OFFICE O/P EST LOW 20 MIN: CPT | Mod: 25 | Performed by: FAMILY MEDICINE

## 2020-12-22 NOTE — LETTER
12/22/2020         RE: Kathi Linn  [id number] [facility] [place] [address]        Dear Colleague,    Thank you for referring your patient, Kathi Linn, to the [facility]. Please see a copy of my visit note below.        [facility] - PRIMARY CARE SKIN    CC: rash on hand and neck  SUBJECTIVE:   Kathi Linn is a(n) 45 year old female who presents to clinic today for follow up eczema and seborrheic dermatitis . Her previous visit was virtual .     Current treatment :  Augmented betamethasone diproprinate 0.05% solution - not used for 2 days because symptoms has improved  Response to treatment : itchy is resolved, scaling has resolved     Personal Medical History  Skin Cancer: NO  Eczema Psoriasis Lupus   yes NO NO   Other: asthma  .    Family Medical History  Skin Cancer: GM non melanoma  Eczema Psoriasis Lupus   NO NO NO   Other:         Occupation: indoor ().    Refer to electronic medical record (EMR) for past medical history and medications.  ROS: 14 point review of systems was negative except the symptoms listed above in the HPI.  OBJECTIVE:   GENERAL: healthy, alert and no distress.  HEENT: PERRL. Conjunctiva, sclera clear.  SKIN: Oliver Skin Type - II.  Arms and Hands, scalp  examined. The dermatoscope was used to help evaluate pigmented lesions.  Skin Pertinent Findings:  Scalp : patchy hair loss , diffuse but most prominent on vertex, right and left parietal scalp, no erythema and no induration ? Telogen effluvium ? Lichen planopilaris ? Female pattern hair loss ? other   Hair extensions are present    Diagnostic Test Results:  No results found for this or any previous visit (from the past 24 hour(s)).    ASSESSMENT:     Encounter Diagnosis   Name Primary?     Hair loss Yes         PLAN:   Patient Instructions   FUTURE APPOINTMENTS  Schedule for virtual recheck appointment in 2 weeks  Continue with current betamethasone diproprinate 0.05%  "solution  Follow up in 7-10 days for suture removal with the nurse. You may go to any [facility] clinic, but if you go elsewhere, make sure to call ahead of time to get on their nurse schedule.      WOUND CARE INSTRUCTIONS  1. Wash hands before every dressing change.  2. Change the dressing after 24 hours and once daily, or earlier if it becomes saturated.  3. Wash the wound area with a mild soap, then rinse.  4. Gently pat dry with a sterile gauze or Q-tip.  5. Using a Q-tip, apply Vaseline or Aquaphor only over entire wound. DO NOT use Neosporin - as many people react to neomycin.  6. Finally, cover with a bandage or sterile non-stick gauze with micropore paper tape.  7. Repeat once daily until wound has healed.      Soap, water and shampoo will not hurt this area.    Do not go swimming or take baths, but showering is encouraged.    Limit use of the area where the procedure was done for a few days to allow for optimal healing.    Signs of Infection:  Infection can occur in any area where skin has been disrupted. If you notice persistent redness, swelling, colored drainage, increasing pain, fever or other signs of infection, please call us at: [phone] and ask to have me or my colleague paged. We will call you back to discuss.    If you experience bleeding:  Wash hands and hold firm pressure on the area for 10 minutes without checking to see if the bleeding has stopped. \"Checking\" pulls off the protective wound clot and restarts the bleeding all over again. Re-apply pressure for 10 minutes if necessary to stop bleeding.  Use additional sterile gauze and tape to maintain pressure once bleeding has stopped.  If bleeding continues, then call back to clinic at [phone].    PATIENT INFORMATION : WOUNDS  During the healing process you will notice a number of changes.     All wounds normally drain.  The larger the wound the more drainage there will be.  After 7-10 days, you will notice the wound beginning to " shrink and new skin will begin to grow.  The wound is healed when you can see that skin has formed over the entire area.  A healed wound has a healthy, shiny look to the surface and is red to dark pink in color to normalize.  Wounds may take approximately 4-6 weeks to heal.  Larger wounds may take 6-8 weeks. After the wound is healed you may discontinue dressing changes.    All wounds develop a small halo of redness surrounding the wound which means that healing is occurring. Severe itching with extensive redness usually indicates sensitivity to the ointment or bandage tape used to dress the wound.  You should call our office if severe itching with extensive redness develops.    Swelling  and/or discoloration around your surgical site is common, particularly when performed around the eye.  You may experience a sensation of tightness as your wound heals. This is normal and will gradually subside.  Your healed wound may be sensitive to temperature changes. This sensitivity improves with time, but if you re having a lot of discomfort, try to avoid temperature extremes.  Patients frequently experience itching after their wound appears to have healed because of the continue healing under the skin.  Plain Vaseline will help relieve the itching.          TT: 20   minutes.      Again, thank you for allowing me to participate in the care of your patient.        Sincerely,        Cesia Shafer MD

## 2020-12-22 NOTE — PROCEDURES
Biopsy Skin (one)  Name : Punch Biopsy  Indication : Biopsy for pathology to evaluate tissue.  Location(s) : Scalp : patchy hair loss , diffuse but most prominent on vertex, right and left parietal scalp, no erythema and no induration ? Telogen effluvium ? Lichen planopilaris ? Female pattern hair loss ? other   Completed by : Valerie Shafer MD  Photo Taken : yes.  Anesthesia : Patient was anesthetized by infiltrating the area surrounding the lesion with 1% lidocaine.   epinephrine [id number] : Yes.  Note : Discussed the risk of pain, infection, scarring, hypo- or hyperpigmentation and recurrence or need for re-treatment. The benefits of treatment and alternative treatments were also discussed.    During this procedure, the universal protocol was utilized. The patient's identity was confirmed by no less than two patient identifiers, correct procedure was verified, correct site was verified and marked as applicable and a final pause was completed.    Sterile technique was used throughout the procedure. The skin was cleaned and prepped with surgical cleanser. Once adequate anesthesia was obtained, the lesion was biopsied using a 4 mm punch and appropriate skin traction. The specimen was sent to pathology.    Direct pressure and  was applied for hemostasis. The skin was closed with simple interrupted sutures of 4-0 Prolene. No bleeding was present upon the completion of the procedure. The wound was coated with antibacterial ointment. A dry sterile dressing was applied.     Biopsy Skin (two )  Name : Punch Biopsy  Indication : Biopsy for pathology to evaluate tissue.  Location(s) : Scalp : patchy hair loss , diffuse but most prominent on vertex, right and left parietal scalp, no erythema and no induration ? Telogen effluvium ? Lichen planopilaris ? Female pattern hair loss ? other   Completed by : Valerie Shafer MD  Photo Taken : yes.  Anesthesia : Patient was anesthetized by infiltrating the area surrounding the lesion  with 1% lidocaine.   epinephrine [id number] : Yes.  Note : Discussed the risk of pain, infection, scarring, hypo- or hyperpigmentation and recurrence or need for re-treatment. The benefits of treatment and alternative treatments were also discussed.    During this procedure, the universal protocol was utilized. The patient's identity was confirmed by no less than two patient identifiers, correct procedure was verified, correct site was verified and marked as applicable and a final pause was completed.    Sterile technique was used throughout the procedure. The skin was cleaned and prepped with surgical cleanser. Once adequate anesthesia was obtained, the lesion was biopsied using a 4 mm punch and appropriate skin traction. The specimen was sent to pathology.    Direct pressure and  was applied for hemostasis. The skin was closed with simple interrupted sutures of 4-0 Prolene. No bleeding was present upon the completion of the procedure. The wound was coated with antibacterial ointment. A dry sterile dressing was applied.

## 2020-12-22 NOTE — PATIENT INSTRUCTIONS
"FUTURE APPOINTMENTS  Schedule for virtual recheck appointment in 2 weeks  Continue with current betamethasone diproprinate 0.05% solution  Follow up in 7-10 days for suture removal with the nurse. You may go to any [facility] clinic, but if you go elsewhere, make sure to call ahead of time to get on their nurse schedule.      WOUND CARE INSTRUCTIONS  1. Wash hands before every dressing change.  2. Change the dressing after 24 hours and once daily, or earlier if it becomes saturated.  3. Wash the wound area with a mild soap, then rinse.  4. Gently pat dry with a sterile gauze or Q-tip.  5. Using a Q-tip, apply Vaseline or Aquaphor only over entire wound. DO NOT use Neosporin - as many people react to neomycin.  6. Finally, cover with a bandage or sterile non-stick gauze with micropore paper tape.  7. Repeat once daily until wound has healed.      Soap, water and shampoo will not hurt this area.    Do not go swimming or take baths, but showering is encouraged.    Limit use of the area where the procedure was done for a few days to allow for optimal healing.    Signs of Infection:  Infection can occur in any area where skin has been disrupted. If you notice persistent redness, swelling, colored drainage, increasing pain, fever or other signs of infection, please call us at: [phone] and ask to have me or my colleague paged. We will call you back to discuss.    If you experience bleeding:  Wash hands and hold firm pressure on the area for 10 minutes without checking to see if the bleeding has stopped. \"Checking\" pulls off the protective wound clot and restarts the bleeding all over again. Re-apply pressure for 10 minutes if necessary to stop bleeding.  Use additional sterile gauze and tape to maintain pressure once bleeding has stopped.  If bleeding continues, then call back to clinic at [phone].    PATIENT INFORMATION : WOUNDS  During the healing process you will notice a number of changes.     All wounds " normally drain.  The larger the wound the more drainage there will be.  After 7-10 days, you will notice the wound beginning to shrink and new skin will begin to grow.  The wound is healed when you can see that skin has formed over the entire area.  A healed wound has a healthy, shiny look to the surface and is red to dark pink in color to normalize.  Wounds may take approximately 4-6 weeks to heal.  Larger wounds may take 6-8 weeks. After the wound is healed you may discontinue dressing changes.    All wounds develop a small halo of redness surrounding the wound which means that healing is occurring. Severe itching with extensive redness usually indicates sensitivity to the ointment or bandage tape used to dress the wound.  You should call our office if severe itching with extensive redness develops.    Swelling  and/or discoloration around your surgical site is common, particularly when performed around the eye.  You may experience a sensation of tightness as your wound heals. This is normal and will gradually subside.  Your healed wound may be sensitive to temperature changes. This sensitivity improves with time, but if you re having a lot of discomfort, try to avoid temperature extremes.  Patients frequently experience itching after their wound appears to have healed because of the continue healing under the skin.  Plain Vaseline will help relieve the itching.

## 2020-12-23 LAB
ALBUMIN SERPL-MCNC: 3.3 G/DL (ref 3.4–5)
ALP SERPL-CCNC: 77 U/L (ref 40–150)
ALT SERPL W P-5'-P-CCNC: 34 U/L (ref 0–50)
ANION GAP SERPL CALCULATED.3IONS-SCNC: 4 MMOL/L (ref 3–14)
AST SERPL W P-5'-P-CCNC: 17 U/L (ref 0–45)
BILIRUB SERPL-MCNC: 1.1 MG/DL (ref 0.2–1.3)
BUN SERPL-MCNC: 17 MG/DL (ref 7–30)
CALCIUM SERPL-MCNC: 8.5 MG/DL (ref 8.5–10.1)
CHLORIDE SERPL-SCNC: 108 MMOL/L (ref 94–109)
CO2 SERPL-SCNC: 26 MMOL/L (ref 20–32)
CREAT SERPL-MCNC: 0.77 MG/DL (ref 0.52–1.04)
FERRITIN SERPL-MCNC: 16 NG/ML (ref 8–252)
GFR SERPL CREATININE-BSD FRML MDRD: >90 ML/MIN/{1.73_M2}
GLUCOSE SERPL-MCNC: 81 MG/DL (ref 70–99)
POTASSIUM SERPL-SCNC: 4.5 MMOL/L (ref 3.4–5.3)
PROT SERPL-MCNC: 6.5 G/DL (ref 6.8–8.8)
SODIUM SERPL-SCNC: 138 MMOL/L (ref 133–144)
TSH SERPL DL<=0.005 MIU/L-ACNC: 1.85 MU/L (ref 0.4–4)

## 2020-12-24 LAB
ANA SER QL IF: NEGATIVE
DEPRECATED CALCIDIOL+CALCIFEROL SERPL-MC: 95 UG/L (ref 20–75)

## 2020-12-28 ENCOUNTER — TELEPHONE (OUTPATIENT)
Dept: FAMILY MEDICINE | Facility: CLINIC | Age: 45
End: 2020-12-28

## 2020-12-28 NOTE — TELEPHONE ENCOUNTER
----- Message from Cesia Shafer MD sent at 12/28/2020  9:33 AM CST -----  Please call ,     Lab work so far looks ok. The serum ferritin is a little on the low side, ideally would like it more around 40 ug/ml . Will discuss this at her follow up visit.     The tissue reports are still pending.     Thank you,   Cesia Shafer M.D.

## 2020-12-28 NOTE — TELEPHONE ENCOUNTER
Sent mychart message    Liana MCKEONRN BSN  [facility]  [phone]

## 2021-01-04 ENCOUNTER — TELEPHONE (OUTPATIENT)
Dept: FAMILY MEDICINE | Facility: CLINIC | Age: 46
End: 2021-01-04

## 2021-01-04 ENCOUNTER — ALLIED HEALTH/NURSE VISIT (OUTPATIENT)
Dept: NURSING | Facility: CLINIC | Age: 46
End: 2021-01-04
Payer: COMMERCIAL

## 2021-01-04 DIAGNOSIS — Z48.02 VISIT FOR SUTURE REMOVAL: Primary | ICD-10-CM

## 2021-01-04 LAB — COPATH REPORT: NORMAL

## 2021-01-04 PROCEDURE — 99207 PR NO CHARGE NURSE ONLY: CPT

## 2021-01-04 NOTE — TELEPHONE ENCOUNTER
Patient notified of test results and providers message, patient has no further questions.  Appointment scheduled to discuss results   Liana MCKEONRN BSN  [facility]  [phone]

## 2021-01-04 NOTE — TELEPHONE ENCOUNTER
----- Message from Cesia Shafer MD sent at 1/4/2021  3:20 PM CST -----  Please call :     Good news, no evidence of scarring hair loss, will review the tissue report at her follow up visit.     Thank you,  Cesia Shafer M.D.

## 2021-01-04 NOTE — NURSING NOTE
Kathi Linn presents to the clinic for removal of sutures and sutures,staples, steri strips. The patient has had sutures in place for 12 days. There has been no patient reported signs or symptoms of infection or drainage. 4  sutures and sutures,staples, staple, steri strips are seen and located on the scalp. Tetanus status is up to date. All sutures and sutures,staples, steri strips were easily removed today. Routine wound care discussed by the RN or provider. The patient will follow up as needed.

## 2021-01-10 NOTE — PROGRESS NOTES
[facility] - PRIMARY CARE SKIN    CC: rash on hand and neck  SUBJECTIVE:   Kathi Linn is a(n) 45 year old female who presents to clinic today for follow up eczema and seborrheic dermatitis .            Tissue report :             Skin, right parietal scalp, punch:   - Nonscarring alopecia with a non-anagen shift and apparent   miniaturization of sebaceous glands - (see   comment)     COMMENT:   Chart history and clinical photos were reviewed with this case.  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  The differential   diagnosis also includes female pattern   hair loss with a concurrent telogen effluvium (potentially owing to   seborrheic dermatitis, given the history).    Clinical correlation is recommended.  Current treatment :  Augmented betamethasone diproprinate 0.05% solution - not used for 2 days because symptoms has improved   Response to treatment : itchy is resolved, scaling has resolved . Has not noted significant hair loss     Personal Medical History  Skin Cancer: NO  Eczema Psoriasis Lupus   yes NO NO   Other: asthma  .    Family Medical History  Skin Cancer: GM non melanoma  Eczema Psoriasis Lupus   NO NO NO   Other:         Occupation: indoor ().    Refer to electronic medical record (EMR) for past medical history and medications.  ROS: 14 point review of systems was negative except the symptoms listed above in the HPI.  OBJECTIVE:   GENERAL: healthy, alert and no distress.  HEENT: PERRL. Conjunctiva, sclera clear.  SKIN: Oliver Skin Type - II.  Arms and Hands, scalp  examined. The dermatoscope was used to help evaluate pigmented lesions.  Skin Pertinent Findings:  Scalp : patchy  hair loss , diffuse but most prominent on vertex, right and left parietal scalp, no erythema and no induration  No scaling or plaques  . Evidence of new hair growth on the parietal scalp. No plaques.    Diagnostic Test Results:  No results found for this or any previous visit (from the past 24 hour(s)).    Refer to electronic medical record (EMR) for past medical history and medications.  ROS: 14 point review of systems was negative except the symptoms listed above in the HPI.  OBJECTIVE:   GENERAL: healthy, alert and no distress.  HEENT: PERRL. Conjunctiva, sclera clear.  SKIN: Oliver Skin Type - II.  Arms and Hands, scalp  examined. The dermatoscope was used to help evaluate pigmented lesions.  Skin Pertinent Findings:  Scalp : patchy hair loss , diffuse but most prominent on vertex, right and left parietal scalp, no erythema and no induration ? Telogen effluvium ? Lichen planopilaris ? Female pattern hair loss ? other   Hair extensions are present    Diagnostic Test Results:  No results found for this or any previous visit (from the past 24 hour(s)).  MDM :  Discussed telogen effluvium and female pattern hair loss- treatment options for telogen effluvium and female pattern hair loss. Clearing the seborrheic dermatitis with topical steroid. Would favor this diagnosis over psoriatic alopecias because no clinical findings at this time to support this.   ASSESSMENT:     Encounter Diagnosis   Name Primary?     Hair loss Yes         PLAN:   Patient Instructions   Apply betamethasone diproprinate 0.05% lotion when needed to areas of itching and scaling   Recheck in 3-4 months  MINOXIDIL (ROGAINE) 5% INSTRUCTIONS  Apply the topical solution to the scalp two hours prior to bedtime to allow the medication to soak in and dry.      Typically during the first 2-8 weeks of treatment, continued shedding may occur. Do not stop application of the medication, as this will resolve over time. This occurs because hair in the resting phase (the hair that is being shed) is gradually shifting into a growth phase.    Initially, there may also be irritation to the  scalp. If irritation is not tolerable, decrease application of Rogaine to once every other day for 2 weeks; then, increase to once per day. However, if excessive irritation continues to occur with decreased application, then stop applying Rogaine and contact me.    Other side effects may include : scaling and itchiness of the scalp.      There are two prescription strengths of Rogaine, 2% (typically used by females) and 5% (typically used by males). It has been recommended that you use the 5% strength.    Increased facial hair growth is more common in women that use higher strengths of Rogaine 5%, occuring in about 14% of women that use 5% strength. This side effect will resolve within 4 months after stopping use of Rogaine.      If Rogaine is discontinued, then the hair growth that has been stimulated will be lost.        TT: 20   minutes.

## 2021-01-11 ENCOUNTER — OFFICE VISIT (OUTPATIENT)
Dept: FAMILY MEDICINE | Facility: CLINIC | Age: 46
End: 2021-01-11
Payer: COMMERCIAL

## 2021-01-11 VITALS — SYSTOLIC BLOOD PRESSURE: 106 MMHG | DIASTOLIC BLOOD PRESSURE: 68 MMHG

## 2021-01-11 DIAGNOSIS — L65.9 HAIR LOSS: Primary | ICD-10-CM

## 2021-01-11 PROCEDURE — 99213 OFFICE O/P EST LOW 20 MIN: CPT | Performed by: FAMILY MEDICINE

## 2021-01-11 NOTE — PATIENT INSTRUCTIONS
Apply betamethasone diproprinate 0.05% lotion when needed to areas of itching and scaling   Recheck in 3-4 months  MINOXIDIL (ROGAINE) 5% INSTRUCTIONS  Apply the topical solution to the scalp two hours prior to bedtime to allow the medication to soak in and dry.      Typically during the first 2-8 weeks of treatment, continued shedding may occur. Do not stop application of the medication, as this will resolve over time. This occurs because hair in the resting phase (the hair that is being shed) is gradually shifting into a growth phase.    Initially, there may also be irritation to the scalp. If irritation is not tolerable, decrease application of Rogaine to once every other day for 2 weeks; then, increase to once per day. However, if excessive irritation continues to occur with decreased application, then stop applying Rogaine and contact me.    Other side effects may include : scaling and itchiness of the scalp.      There are two prescription strengths of Rogaine, 2% (typically used by females) and 5% (typically used by males). It has been recommended that you use the 5% strength.    Increased facial hair growth is more common in women that use higher strengths of Rogaine 5%, occuring in about 14% of women that use 5% strength. This side effect will resolve within 4 months after stopping use of Rogaine.      If Rogaine is discontinued, then the hair growth that has been stimulated will be lost.

## 2021-01-11 NOTE — LETTER
1/11/2021         RE: Kathi Linn  [id number] [facility] [id number]        Dear Colleague,    Thank you for referring your patient, Kathi Linn, to the [facility]. Please see a copy of my visit note below.        [facility] - PRIMARY CARE SKIN    CC: rash on hand and neck  SUBJECTIVE:   Kathi Linn is a(n) 45 year old female who presents to clinic today for follow up eczema and seborrheic dermatitis .            Tissue report :             Skin, right parietal scalp, punch:   - Nonscarring alopecia with a non-anagen shift and apparent   miniaturization of sebaceous glands - (see   comment)     COMMENT:   Chart history and clinical photos were reviewed with this case.  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  The differential   diagnosis also includes female pattern   hair loss with a concurrent telogen effluvium (potentially owing to   seborrheic dermatitis, given the history).    Clinical correlation is recommended.  Current treatment :  Augmented betamethasone diproprinate 0.05% solution - not used for 2 days because symptoms has improved   Response to treatment : itchy is resolved, scaling has resolved . Has not noted significant hair loss     Personal Medical History  Skin Cancer: NO  Eczema Psoriasis Lupus   yes NO NO   Other: asthma  .    Family Medical History  Skin Cancer: GM non melanoma  Eczema Psoriasis Lupus   NO NO NO   Other:         Occupation: indoor ().    Refer to electronic medical record (EMR) for past medical history and medications.  ROS: 14 point review of systems was negative except the symptoms listed above in the HPI.  OBJECTIVE:   GENERAL: healthy, alert and no distress.  HEENT: PERRL. Conjunctiva, sclera clear.  SKIN: Oliver Skin Type - II.  Arms and  Hands, scalp  examined. The dermatoscope was used to help evaluate pigmented lesions.  Skin Pertinent Findings:  Scalp : patchy  hair loss , diffuse but most prominent on vertex, right and left parietal scalp, no erythema and no induration  No scaling or plaques . Evidence of new hair growth on the parietal scalp. No plaques.    Diagnostic Test Results:  No results found for this or any previous visit (from the past 24 hour(s)).    Refer to electronic medical record (EMR) for past medical history and medications.  ROS: 14 point review of systems was negative except the symptoms listed above in the HPI.  OBJECTIVE:   GENERAL: healthy, alert and no distress.  HEENT: PERRL. Conjunctiva, sclera clear.  SKIN: Oliver Skin Type - II.  Arms and Hands, scalp  examined. The dermatoscope was used to help evaluate pigmented lesions.  Skin Pertinent Findings:  Scalp : patchy hair loss , diffuse but most prominent on vertex, right and left parietal scalp, no erythema and no induration ? Telogen effluvium ? Lichen planopilaris ? Female pattern hair loss ? other   Hair extensions are present    Diagnostic Test Results:  No results found for this or any previous visit (from the past 24 hour(s)).  MDM :  Discussed telogen effluvium and female pattern hair loss- treatment options for telogen effluvium and female pattern hair loss. Clearing the seborrheic dermatitis with topical steroid. Would favor this diagnosis over psoriatic alopecias because no clinical findings at this time to support this.   ASSESSMENT:     Encounter Diagnosis   Name Primary?     Hair loss Yes         PLAN:   Patient Instructions   Apply betamethasone diproprinate 0.05% lotion when needed to areas of itching and scaling   Recheck in 3-4 months  MINOXIDIL (ROGAINE) 5% INSTRUCTIONS  Apply the topical solution to the scalp two hours prior to bedtime to allow the medication to soak in and dry.      Typically during the first 2-8 weeks of treatment, continued  shedding may occur. Do not stop application of the medication, as this will resolve over time. This occurs because hair in the resting phase (the hair that is being shed) is gradually shifting into a growth phase.    Initially, there may also be irritation to the scalp. If irritation is not tolerable, decrease application of Rogaine to once every other day for 2 weeks; then, increase to once per day. However, if excessive irritation continues to occur with decreased application, then stop applying Rogaine and contact me.    Other side effects may include : scaling and itchiness of the scalp.      There are two prescription strengths of Rogaine, 2% (typically used by females) and 5% (typically used by males). It has been recommended that you use the 5% strength.    Increased facial hair growth is more common in women that use higher strengths of Rogaine 5%, occuring in about 14% of women that use 5% strength. This side effect will resolve within 4 months after stopping use of Rogaine.      If Rogaine is discontinued, then the hair growth that has been stimulated will be lost.        TT: 20   minutes.      Again, thank you for allowing me to participate in the care of your patient.        Sincerely,        Cesia Shafer MD

## 2021-01-15 ENCOUNTER — HEALTH MAINTENANCE LETTER (OUTPATIENT)
Age: 46
End: 2021-01-15

## 2021-04-12 ENCOUNTER — APPOINTMENT (OUTPATIENT)
Dept: CT IMAGING | Facility: CLINIC | Age: 46
End: 2021-04-12
Attending: PHYSICIAN ASSISTANT
Payer: COMMERCIAL

## 2021-04-12 ENCOUNTER — NURSE TRIAGE (OUTPATIENT)
Dept: NURSING | Facility: CLINIC | Age: 46
End: 2021-04-12

## 2021-04-12 ENCOUNTER — HOSPITAL ENCOUNTER (EMERGENCY)
Facility: CLINIC | Age: 46
Discharge: HOME | End: 2021-04-13
Attending: PHYSICIAN ASSISTANT | Admitting: PHYSICIAN ASSISTANT
Payer: COMMERCIAL

## 2021-04-12 DIAGNOSIS — R05.9 COUGH: ICD-10-CM

## 2021-04-12 DIAGNOSIS — R06.02 SOB (SHORTNESS OF BREATH): ICD-10-CM

## 2021-04-12 DIAGNOSIS — U07.1 2019 NOVEL CORONAVIRUS DISEASE (COVID-19): ICD-10-CM

## 2021-04-12 LAB
ALBUMIN SERPL-MCNC: 2.9 G/DL (ref 3.4–5)
ALP SERPL-CCNC: 84 U/L (ref 40–150)
ALT SERPL W P-5'-P-CCNC: 40 U/L (ref 0–50)
ANION GAP SERPL CALCULATED.3IONS-SCNC: <1 MMOL/L (ref 3–14)
AST SERPL W P-5'-P-CCNC: 28 U/L (ref 0–45)
B-HCG FREE SERPL-ACNC: <5 IU/L
BASOPHILS # BLD AUTO: 0 10E9/L (ref 0–0.2)
BASOPHILS NFR BLD AUTO: 0 %
BILIRUB SERPL-MCNC: 0.5 MG/DL (ref 0.2–1.3)
BUN SERPL-MCNC: 14 MG/DL (ref 7–30)
CALCIUM SERPL-MCNC: 8.3 MG/DL (ref 8.5–10.1)
CHLORIDE SERPL-SCNC: 109 MMOL/L (ref 94–109)
CO2 SERPL-SCNC: 29 MMOL/L (ref 20–32)
CREAT SERPL-MCNC: 0.81 MG/DL (ref 0.52–1.04)
D DIMER PPP FEU-MCNC: 0.6 UG/ML FEU (ref 0–0.5)
DIFFERENTIAL METHOD BLD: NORMAL
EOSINOPHIL # BLD AUTO: 0 10E9/L (ref 0–0.7)
EOSINOPHIL NFR BLD AUTO: 1 %
ERYTHROCYTE [DISTWIDTH] IN BLOOD BY AUTOMATED COUNT: 11.7 % (ref 10–15)
GFR SERPL CREATININE-BSD FRML MDRD: 87 ML/MIN/{1.73_M2}
GLUCOSE SERPL-MCNC: 80 MG/DL (ref 70–99)
HCT VFR BLD AUTO: 39.2 % (ref 35–47)
HGB BLD-MCNC: 12.6 G/DL (ref 11.7–15.7)
LACTATE BLD-SCNC: 0.6 MMOL/L (ref 0.7–2)
LYMPHOCYTES # BLD AUTO: 1.4 10E9/L (ref 0.8–5.3)
LYMPHOCYTES NFR BLD AUTO: 33 %
MCH RBC QN AUTO: 29.9 PG (ref 26.5–33)
MCHC RBC AUTO-ENTMCNC: 32.1 G/DL (ref 31.5–36.5)
MCV RBC AUTO: 93 FL (ref 78–100)
MONOCYTES # BLD AUTO: 0.3 10E9/L (ref 0–1.3)
MONOCYTES NFR BLD AUTO: 7 %
NEUTROPHILS # BLD AUTO: 2.5 10E9/L (ref 1.6–8.3)
NEUTROPHILS NFR BLD AUTO: 59 %
PLATELET # BLD AUTO: 194 10E9/L (ref 150–450)
PLATELET # BLD EST: NORMAL 10*3/UL
POTASSIUM SERPL-SCNC: 4.1 MMOL/L (ref 3.4–5.3)
PROT SERPL-MCNC: 6.5 G/DL (ref 6.8–8.8)
RBC # BLD AUTO: 4.22 10E12/L (ref 3.8–5.2)
RBC MORPH BLD: NORMAL
SODIUM SERPL-SCNC: 138 MMOL/L (ref 133–144)
TROPONIN I SERPL-MCNC: <0.015 UG/L (ref 0–0.04)
WBC # BLD AUTO: 4.3 10E9/L (ref 4–11)

## 2021-04-12 PROCEDURE — 71275 CT ANGIOGRAPHY CHEST: CPT

## 2021-04-12 PROCEDURE — 258N000003 HC RX IP 258 OP 636: Performed by: PHYSICIAN ASSISTANT

## 2021-04-12 PROCEDURE — 99285 EMERGENCY DEPT VISIT HI MDM: CPT | Mod: 25

## 2021-04-12 PROCEDURE — 93005 ELECTROCARDIOGRAM TRACING: CPT

## 2021-04-12 PROCEDURE — 85379 FIBRIN DEGRADATION QUANT: CPT | Performed by: PHYSICIAN ASSISTANT

## 2021-04-12 PROCEDURE — 96360 HYDRATION IV INFUSION INIT: CPT | Mod: 59

## 2021-04-12 PROCEDURE — 96361 HYDRATE IV INFUSION ADD-ON: CPT

## 2021-04-12 PROCEDURE — 84702 CHORIONIC GONADOTROPIN TEST: CPT

## 2021-04-12 PROCEDURE — 80053 COMPREHEN METABOLIC PANEL: CPT | Performed by: PHYSICIAN ASSISTANT

## 2021-04-12 PROCEDURE — 250N000011 HC RX IP 250 OP 636: Performed by: PHYSICIAN ASSISTANT

## 2021-04-12 PROCEDURE — 85025 COMPLETE CBC W/AUTO DIFF WBC: CPT | Performed by: PHYSICIAN ASSISTANT

## 2021-04-12 PROCEDURE — 84484 ASSAY OF TROPONIN QUANT: CPT | Performed by: PHYSICIAN ASSISTANT

## 2021-04-12 PROCEDURE — 83605 ASSAY OF LACTIC ACID: CPT | Performed by: PHYSICIAN ASSISTANT

## 2021-04-12 RX ORDER — IOPAMIDOL 755 MG/ML
500 INJECTION, SOLUTION INTRAVASCULAR ONCE
Status: COMPLETED | OUTPATIENT
Start: 2021-04-12 | End: 2021-04-12

## 2021-04-12 RX ORDER — SODIUM CHLORIDE 9 MG/ML
INJECTION, SOLUTION INTRAVENOUS CONTINUOUS
Status: DISCONTINUED | OUTPATIENT
Start: 2021-04-12 | End: 2021-04-13 | Stop reason: HOSPADM

## 2021-04-12 RX ADMIN — IOPAMIDOL 63 ML: 755 INJECTION, SOLUTION INTRAVENOUS at 23:38

## 2021-04-12 RX ADMIN — SODIUM CHLORIDE 1000 ML: 9 INJECTION, SOLUTION INTRAVENOUS at 22:47

## 2021-04-12 ASSESSMENT — ENCOUNTER SYMPTOMS
MYALGIAS: 1
FEVER: 1
SHORTNESS OF BREATH: 1
COUGH: 1
FATIGUE: 1
HEADACHES: 1

## 2021-04-12 NOTE — TELEPHONE ENCOUNTER
Pt called in states she diagnosed with covid-19 4/5/2021.  The Pt has chest tightness.  Has Shortness of breath, cough.  Low grade ever.  Pt temp 99.6 temporal.  The chest tightness is constant.  The worse symptom chest tightness and pain on her back.  Shortness of breath constat and at rest.  Pt has history of asthma.  No chills, has fatigue.  has headache.  The disposition is to go ED.  Care advice given per protocol.  Patient agrees with care advice given.   Agreed to call back if he has additional symptoms or questions.    Blas Islas [place] Nurse Advisor 4/12/2021 6:28 PM      Reason for Disposition    MODERATE difficulty breathing (e.g., speaks in phrases, SOB even at rest, pulse 100-120)    SEVERE or constant chest pain or pressure (Exception: mild central chest pain, present only when coughing)    Additional Information    Negative: SEVERE difficulty breathing (e.g., struggling for each breath, speaks in single words)    Negative: Difficult to awaken or acting confused (e.g., disoriented, slurred speech)    Negative: Bluish (or gray) lips or face now    Negative: Shock suspected (e.g., cold/pale/clammy skin, too weak to stand, low BP, rapid pulse)    Negative: Sounds like a life-threatening emergency to the triager    Negative: [1] COVID-19 exposure AND [2] no symptoms    Negative: [1] Lives with someone known to have influenza (flu test positive) AND [2] flu-like symptoms (e.g., cough, runny nose, sore throat, SOB; with or without fever)    Negative: [1] Adult with possible COVID-19 symptoms AND [2] triager concerned about severity of symptoms or other causes    Negative: COVID-19 vaccine reaction suspected (e.g., fever, headache, muscle aches) occurring during days 1-3 after getting vaccine    Negative: COVID-19 vaccine, questions about    Negative: COVID-19 and breastfeeding, questions about    Protocols used: CORONAVIRUS (COVID-19) DIAGNOSED OR [id number] 1.3

## 2021-04-13 VITALS
SYSTOLIC BLOOD PRESSURE: 130 MMHG | RESPIRATION RATE: 20 BRPM | OXYGEN SATURATION: 100 % | TEMPERATURE: 98.2 F | HEART RATE: 72 BPM | DIASTOLIC BLOOD PRESSURE: 85 MMHG

## 2021-04-13 LAB — INTERPRETATION ECG - MUSE: NORMAL

## 2021-04-13 RX ORDER — BENZONATATE 100 MG/1
100 CAPSULE ORAL 3 TIMES DAILY PRN
Qty: 21 CAPSULE | Refills: 0 | Status: SHIPPED | OUTPATIENT
Start: 2021-04-13 | End: 2021-05-26

## 2021-04-13 NOTE — DISCHARGE INSTRUCTIONS
Discharge Instructions  COVID-19    COVID-19 is the disease caused by a new coronavirus. The virus spreads from person-to-person primarily by droplets when an infected person coughs or sneezes and the droplet either lands on another person or that other person touches a surface with the droplet on it. There are tests available to diagnose COVID-19. There is no specific treatment or medicine for the disease.    You may have been diagnosed with COVID, may be being tested for COVID and have a pending test result, or may have been exposed to COVID.    Symptoms of COVID-19    Many people have no symptoms or mild symptoms.  Symptoms may usually appear 4 to 5 days (up to 14 days) after contact with a person with COVID-19. Some people will get severe symptoms and pneumonia. Usual symptoms are:     ? Fever  ? Cough  ? Trouble breathing    Less common symptoms are: Headache, body aches, sore throat, sneezing, diarrhea, loss of taste or smell.    Isolation and Quarantine    You were seen because you have symptoms, had an exposure, or had some other concern about possible COVID. The best way to stop the spread of the virus is to avoid contact with others.  Isolation refers to sick people staying away from people who are not sick. A person in quarantine is limiting activity because they were exposed and are waiting to see if they might become sick.    If you test positive for COVID, you should stay home (isolation) for at least 10 days after your symptoms began, and for 24 hours with no fever and improvement of symptoms--whichever is longer. (Your fever should be gone for 24 hours without using fever-reducing medicine). If you have no symptoms, you should stay home (isolation) for 10 days from the day of the test.    For example, if you have a fever and cough for 6 days, you need to stay home 4 more days with no fever for a total of 10 days. Or, if you have a fever and cough for 10 days, you need to stay home one more day with  no fever for a total of 11 days.    If you have a high-risk exposure to COVID (you spent 15 minutes or more within six feet of somebody who has COVID), you should stay home (quarantine) for 14 days. Even if you test negative for COVID, the CDC recommends a 14-day quarantine from the time of your last exposure to that individual. There are options for a shortened (<14 day quarantine) you can review at:    https://www.health.[facility].mn./diseases/coronavirus/close.html#long    If you have symptoms but a negative test, you should stay at home until you are symptom-free and without fever for 24 hours, using the same judgment you would for when it is safe to return to work/school from strep throat, influenza, or the common cold. If you worsen, you should consider being re-evaluated.    If you are being tested for COVID and your test is pending, you should stay home until you know your test result.    How should I protect myself and others?    Do not go to work or school. Have a friend or relative do your shopping. Do not use public transportation (bus, train) or ridesharing (Lyft, Uber).    Separate yourself from other people in your home. As much as possible, you should stay in one room and away from other people in your home. Also, use a separate bathroom, if possible. Avoid handling pets or other animals while sick.     Wear a facemask if you need to be around other people and cover your mouth and nose with a tissue when you cough or sneeze.     Avoid sharing personal household items. You should not share dishes, drinking glasses, forks/knives/spoons, towels, or bedding with other people in your home. After using these items, they should be washed with soap and water. Clean parts of your home that are touched often (doorknobs, faucets, countertops, etc.) daily.     Wash your hands often with soap and water for at least 20 seconds or use an alcohol-based hand  containing at least 60% alcohol.     Avoid touching  your face.    Treat your symptoms. You can take Acetaminophen (Tylenol) to treat body aches and fever as needed for comfort. Ibuprofen (Advil or Motrin) can be used as well if you still have symptoms after taking Tylenol. Drink fluids. Rest.    Watch for worsening symptoms such as shortness of breath/difficulty breathing or very severe weakness.    Employers/workplaces are being asked by the Centers for Disease Control (CDC) to not request notes/documentation for you to return to work or prove that you were ill. You may choose to show your employer this paperwork. Also, repeat testing should not be required to return to work.    Exercise/Sports in rare cases, COVID could affect your heart in a way that makes exercise or participation in sports dangerous.  If you have a mild COVID illness (fever, cough, sore throat, and similar symptoms but no difficulty breathing or abnormalities of the lung): After your COVID symptoms have resolved, wait 14-days before returning to activity.  If you have more than a mild illness (meaning that you have problems with your breathing or lungs) or if you participate in competitive or strenuous activity or have a history of heart disease: Please see your primary doctor/provider prior to return to activity/competition.    Return to the Emergency Department if:    If you are developing worsening breathing, shortness of breath, or feel worse you should seek medical attention.  If you are uncertain, contact your health care provider/clinic. If you need emergency medical attention, call 911 and tell them you have been ill.

## 2021-04-13 NOTE — ED NOTES
Pt verbalizes understanding of discharge plan, follow up and s/s to return to ER. Pt verbalizes proper use of home pulse ox. Discharged ambulating well

## 2021-04-13 NOTE — ED PROVIDER NOTES
History   Chief Complaint:  Covid Concern     The history is provided by the patient.      Kathi Linn is a 45 year old female with history of asthma who presents with worsening cough and shortness of breath in the setting of recent COVID diagnosis on . She also reports constant mid sternal chest pain radiating to her back. The pain is more sharp in nature and is exacerbated by exertion. She also reports fevers with Tmax of 102 F, fatigue, body aches and headaches. She has been experiencing a headache behind her eyes since . She has been taking Tylenol and ibuprofen for her symptoms. Not on blood thinners.     Review of Systems   Constitutional: Positive for fatigue and fever.   Respiratory: Positive for cough and shortness of breath.    Cardiovascular: Positive for chest pain.   Musculoskeletal: Positive for myalgias.   Neurological: Positive for headaches.   All other systems reviewed and are negative.      Allergies:  Corticosteroids    Medications:  Albuterol inhaler  Fergon    Past Medical History:    Asthma      Past Surgical History:    Duodeneal switch   section  Cholecystectomy  Herniorrhaphy ventral      Family History:    Father - lymphoma, obesity  Mother - hypertension, cerebrovascular disease, depression, osteoporosis  Sister(s) - depression, hypertension     Social History:  The patient was unaccompanied to the ED.     Physical Exam     Patient Vitals for the past 24 hrs:   BP Temp Temp src Pulse Resp SpO2   21 2240 -- -- -- -- -- 98 %   21 2230 -- -- -- -- -- 97 %   21 2220 -- -- -- -- -- 97 %   21 2215 129/76 -- -- 66 -- 97 %   21 1913 (!) 138/95 98.2  F (36.8  C) Oral 80 20 99 %       Physical Exam  Constitutional: Pleasant. Cooperative.   Eyes: Pupils equally round and reactive  HENT: Head is normal in appearance. Oropharynx is normal with moist mucus membranes.  Cardiovascular: Regular rate and rhythm and without murmurs.  Respiratory: Normal  respiratory effort, lungs are clear bilaterally.  GI: Abdomen is soft, non-tender, non-distended. No guarding, rebound, or rigidity.  Musculoskeletal: No asymmetry of the lower extremities, no tenderness to palpation.   Skin: Normal, without rash.  Neurologic: Cranial nerves grossly intact, normal cognition, no focal deficits. Alert and oriented x 3.   Psychiatric: Normal affect.  Nursing notes and vital signs reviewed.    Emergency Department Course   ECG  ECG taken at 2206, ECG read at 2209  Normal sinus rhythm  Normal ECG   Rate 65 bpm. MS interval 174 ms. QRS duration 84 ms. QT/QTc 396/411 ms. P-R-T axes 18 -7 26.     Imaging:  CT Chest Pulmonary Embolism w Contrast   Final Result   IMPRESSION:   1.  No evidence for pulmonary embolism.    2.  Moderate pneumonia, appearance compatible with COVID 19 pneumonia.          Laboratory:  Labs Ordered and Resulted from Time of ED Arrival Up to the Time of Departure from the ED   D DIMER QUANTITATIVE - Abnormal; Notable for the following components:       Result Value    D Dimer 0.6 (*)     All other components within normal limits   COMPREHENSIVE METABOLIC PANEL - Abnormal; Notable for the following components:    Anion Gap <1 (*)     Calcium 8.3 (*)     Albumin 2.9 (*)     Protein Total 6.5 (*)     All other components within normal limits   LACTIC ACID WHOLE BLOOD - Abnormal; Notable for the following components:    Lactic Acid 0.6 (*)     All other components within normal limits   CBC WITH PLATELETS DIFFERENTIAL   TROPONIN I   ISTAT HCG QUANTITATIVE PREGNANCY NURSING POCT   CARDIAC CONTINUOUS MONITORING   ISTAT HCG QUANTITATIVE PREGNANCY POCT       Emergency Department Course:    Reviewed:  2128 I reviewed nursing notes, vitals, past medical history and care everywhere    Assessments:  2130 I obtained history and examined the patient as noted above.   0013 I rechecked the patient and explained findings.     Interventions:  Medications   0.9% sodium chloride BOLUS  (1,000 mLs Intravenous New Bag 4/12/21 2247)     Followed by   sodium chloride 0.9% infusion (has no administration in time range)   sodium chloride (PF) 0.9% PF flush 100 mL (82 mLs Intravenous Given 4/12/21 2338)   iopamidol (ISOVUE-370) solution 500 mL (63 mLs Intravenous Given 4/12/21 2338)     Disposition:  The patient was discharged to home.       Impression & Plan     Medical Decision Making:  Kathi Linn is a 45 year old female who presents to the ED for evaluation of cough and shortness of breath in the setting of recent Covid diagnosis.  Patient notes associated chest pain.  See HPI as above for additional details.  Vitals and physical exam as above.  Differential was broad and included ACS, PE, pneumonia, pneumothorax, MSK, among others.  Work-up as above.  CT reveals evidence for pneumonia, compatible with Covid pneumonia.  No evidence for complication at this point in time.  Patient request something for cough for home, and this is provided as below.  [place] patient was safe for discharge to home. Discussed reasons to return. All questions answered. Patient discharged to home in stable condition.    Covid-19  Kathi Linn was evaluated during a global COVID-19 pandemic, which necessitated consideration that the patient might be at risk for infection with the SARS-CoV-2 virus that causes COVID-19.   Applicable protocols for evaluation were followed during the patient's care.   COVID-19 was considered as part of the patient's evaluation. The plan for testing is:  a test was obtained at a previous visit and reviewed & considered today.    Diagnosis:    ICD-10-CM    1. 2019 novel coronavirus disease (COVID-19)  U07.1    2. SOB (shortness of breath)  R06.02    3. Cough  R05        Discharge Medications:  New Prescriptions    BENZONATATE (TESSALON) 100 MG CAPSULE    Take 1 capsule (100 mg) by mouth 3 times daily as needed for cough       Scribe Disclosure:  Dionne JUAREZ, am serving as a scribe at  9:40 PM on 4/12/2021 to document services personally performed by Alfie Jason PA-C based on my observations and the provider's statements to me.     This record was created at least in part using electronic voice recognition software, so please excuse any typographical errors.       Alfie Jason PA-C  04/13/21 0015

## 2021-04-13 NOTE — ED TRIAGE NOTES
Pt states tested positive for COVID-19 one week pta with symptom onset 8 days pta. Pt states worsening fever, bodyaches, and dyspnea. ABCs intact GCS 15

## 2021-04-28 ENCOUNTER — MYC MEDICAL ADVICE (OUTPATIENT)
Dept: INTERNAL MEDICINE | Facility: CLINIC | Age: 46
End: 2021-04-28

## 2021-05-11 NOTE — PROGRESS NOTES
"[facility] - PRIMARY CARE SKIN    CC: hair loss  SUBJECTIVE:   Kathi Linn is a(n) 45 year old female who presents to clinic today for follow up of hair loss ? Tissue report was psoriatic alopecia, female pattern hair loss with concurrent telogen effluvium ( later is favored ) . There were no scaly plaques or erythema on the scalp that would suggest psoriatic process .  Current treatment :   Response to treatment :no itching, stopped minoxidil and betamethasone diproprinate 0.05% lotion. Has appreciated new hair growtth.  Issue two : had Covid 19 in 7 weeks and had significant symptoms. Still feels \" brain fog \", lack of energy.So far no increased hair loss. Uses shampoo for hair loss and uses anti dandruff shampoo.        Personal Medical History  Skin Cancer: NO  Eczema Psoriasis Lupus   yes NO NO   Other: asthma  .    Family Medical History  Skin Cancer: GM non melanoma  Eczema Psoriasis Lupus   NO NO NO   Other:     Occupation: indoor     Refer to electronic medical record (EMR) for past medical history and medications.  ROS: 14 point review of systems was negative except the symptoms listed above in the HPI.  OBJECTIVE:   GENERAL: healthy, alert and no distress.  HEENT: PERRL. Conjunctiva, sclera clear.  SKIN: Oliver Skin Type - II.  Arms and Hands, scalp  examined. The dermatoscope was used to help evaluate pigmented lesions.  Skin Pertinent Findings:  Scalp : evidence of previous patchy hair loss , areas of new hair growth in the parietal, occipital and frontal scalp., no erythema and no induration  No scaling or plaques . There is more hair growth since the last visit.     Face and neck - scattered erythematous lightly scaly patches consistent with eczema      MDM :  Discussed that she could see more hair loss because of the COVID 19 infection but it would be temporary  ASSESSMENT:     Encounter Diagnoses   Name Primary?     Seborrheic dermatitis Yes     Telogen effluvium      Intrinsic " eczema      Eczema, unspecified type          PLAN:   Patient Instructions   Do not start the minoxidil solution 5 % unless you notice hair loss.    Face       Triamcinolone 0.1% cream apply two times per day for 7-10 days only    Eyelids       1 % hydrocortisone ointment ( OTC ) apply to eyelids two times per day for ONLY 3-5 days    Vitamin D 2000 international unit(s) per day

## 2021-05-12 ENCOUNTER — OFFICE VISIT (OUTPATIENT)
Dept: FAMILY MEDICINE | Facility: CLINIC | Age: 46
End: 2021-05-12
Payer: COMMERCIAL

## 2021-05-12 VITALS — SYSTOLIC BLOOD PRESSURE: 102 MMHG | DIASTOLIC BLOOD PRESSURE: 80 MMHG

## 2021-05-12 DIAGNOSIS — L21.9 SEBORRHEIC DERMATITIS: Primary | ICD-10-CM

## 2021-05-12 DIAGNOSIS — L20.84 INTRINSIC ECZEMA: ICD-10-CM

## 2021-05-12 DIAGNOSIS — L30.9 ECZEMA, UNSPECIFIED TYPE: ICD-10-CM

## 2021-05-12 DIAGNOSIS — L65.0 TELOGEN EFFLUVIUM: ICD-10-CM

## 2021-05-12 PROCEDURE — 99214 OFFICE O/P EST MOD 30 MIN: CPT | Performed by: FAMILY MEDICINE

## 2021-05-12 RX ORDER — TRIAMCINOLONE ACETONIDE 1 MG/G
CREAM TOPICAL 2 TIMES DAILY
Qty: 80 G | Refills: 0 | Status: SHIPPED | OUTPATIENT
Start: 2021-05-12 | End: 2022-03-09

## 2021-05-12 RX ORDER — FLUOCINOLONE ACETONIDE 0.1 MG/ML
SOLUTION TOPICAL
Qty: 60 ML | Refills: 3 | Status: SHIPPED | OUTPATIENT
Start: 2021-05-12 | End: 2022-02-03

## 2021-05-12 NOTE — PATIENT INSTRUCTIONS
Do not start the minoxidil solution 5 % unless you notice hair loss.    Face       Triamcinolone 0.1% cream apply two times per day for 7-10 days only    Eyelids       1 % hydrocortisone ointment ( OTC ) apply to eyelids two times per day for ONLY 3-5 days    Vitamin D 2000 international unit(s) per day

## 2021-05-12 NOTE — LETTER
"    5/12/2021         RE: Kathi Linn  [address] [facility] [id number]        Dear Colleague,    Thank you for referring your patient, Kathi Linn, to the [facility]. Please see a copy of my visit note below.        [facility] - PRIMARY CARE SKIN    CC: hair loss  SUBJECTIVE:   Kathi Linn is a(n) 45 year old female who presents to clinic today for follow up of hair loss ? Tissue report was psoriatic alopecia, female pattern hair loss with concurrent telogen effluvium ( later is favored ) . There were no scaly plaques or erythema on the scalp that would suggest psoriatic process .  Current treatment :   Response to treatment :no itching, stopped minoxidil and betamethasone diproprinate 0.05% lotion. Has appreciated new hair growtth.  Issue two : had Covid 19 in 7 weeks and had significant symptoms. Still feels \" brain fog \", lack of energy.So far no increased hair loss. Uses shampoo for hair loss and uses anti dandruff shampoo.        Personal Medical History  Skin Cancer: NO  Eczema Psoriasis Lupus   yes NO NO   Other: asthma  .    Family Medical History  Skin Cancer: GM non melanoma  Eczema Psoriasis Lupus   NO NO NO   Other:     Occupation: indoor     Refer to electronic medical record (EMR) for past medical history and medications.  ROS: 14 point review of systems was negative except the symptoms listed above in the HPI.  OBJECTIVE:   GENERAL: healthy, alert and no distress.  HEENT: PERRL. Conjunctiva, sclera clear.  SKIN: Oliver Skin Type - II.  Arms and Hands, scalp  examined. The dermatoscope was used to help evaluate pigmented lesions.  Skin Pertinent Findings:  Scalp : evidence of previous patchy hair loss , areas of new hair growth in the parietal, occipital and frontal scalp., no erythema and no induration  No scaling or plaques . There is more hair growth since the last visit.     Face and neck - scattered erythematous lightly scaly patches " consistent with eczema      MDM :  Discussed that she could see more hair loss because of the COVID 19 infection but it would be temporary  ASSESSMENT:     Encounter Diagnoses   Name Primary?     Seborrheic dermatitis Yes     Telogen effluvium      Intrinsic eczema      Eczema, unspecified type          PLAN:   Patient Instructions   Do not start the minoxidil solution 5 % unless you notice hair loss.    Face       Triamcinolone 0.1% cream apply two times per day for 7-10 days only    Eyelids       1 % hydrocortisone ointment ( OTC ) apply to eyelids two times per day for ONLY 3-5 days    Vitamin D 2000 international unit(s) per day            Again, thank you for allowing me to participate in the care of your patient.        Sincerely,        Cesia Shafer MD

## 2021-05-26 ENCOUNTER — OFFICE VISIT (OUTPATIENT)
Dept: INTERNAL MEDICINE | Facility: CLINIC | Age: 46
End: 2021-05-26
Payer: COMMERCIAL

## 2021-05-26 VITALS
BODY MASS INDEX: 25.62 KG/M2 | OXYGEN SATURATION: 98 % | WEIGHT: 173 LBS | HEART RATE: 84 BPM | TEMPERATURE: 98.2 F | HEIGHT: 69 IN | SYSTOLIC BLOOD PRESSURE: 124 MMHG | DIASTOLIC BLOOD PRESSURE: 89 MMHG

## 2021-05-26 DIAGNOSIS — R53.83 FATIGUE, UNSPECIFIED TYPE: ICD-10-CM

## 2021-05-26 DIAGNOSIS — R41.89 COGNITIVE CHANGES: ICD-10-CM

## 2021-05-26 DIAGNOSIS — U07.1 INFECTION DUE TO 2019 NOVEL CORONAVIRUS: Primary | ICD-10-CM

## 2021-05-26 PROCEDURE — 99214 OFFICE O/P EST MOD 30 MIN: CPT | Performed by: INTERNAL MEDICINE

## 2021-05-26 ASSESSMENT — MIFFLIN-ST. JEOR: SCORE: 1484.1

## 2021-05-26 NOTE — PROGRESS NOTES
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 I suspect she needs some work restrictions but she could not give me good guidance as to what she needs so will leave that up to the Covid clinic to address.   - PHYSICAL THERAPY REFERRAL; Future  - ADULT POST COVID CLINIC REFERRAL; Future    Cognitive changes  as above.   - PHYSICAL THERAPY REFERRAL; Future  - ADULT POST COVID CLINIC REFERRAL; Future    Fatigue, unspecified type  as above.   - PHYSICAL THERAPY REFERRAL; Future  - ADULT POST COVID CLINIC REFERRAL; Future           BMI:   Estimated body mass index is 25.55 kg/m  as calculated from the following:    Height as of this encounter: 1.753 m (5' 9\").    Weight as of this encounter: 78.5 kg (173 lb).           Return in about 2 months (around 7/26/2021).    Marti Belle MD  [facility] QUAN Armenta is a 45 year old who presents for the following health issues     HPI     ED/UC Followup:    Facility:  [facility]  Date of visit: 4-  Reason for visit: Covid         She developed significant covid with fever, body aches, cough, shortness of breath and pulmonary infiltrates in April. Since then she feels like she is still sick. She has no cough or shortness of breath but she says she has severe brains fog. Often after a meeting at work the top of her head will actually ache and she feels almost confused. She is havinmg problems with reading comprehension. She really cannot work longer than 6 hrs. If she has a busy day one day she cannot think straight the next day.     She is sleeping 10 hrs a day and wakes up exhausted. She used to sleep 7 hours and be ready to go. She is in a dance class but again besides fatigue is having cognitive " "problems with the timing that she did not have before.     She is looking for help and direction as to how to best address this.     Review of Systems   Constitutional, HEENT, cardiovascular, pulmonary, GI, , musculoskeletal, neuro, skin, endocrine and psych systems are negative, except as otherwise noted.      Objective    /89 (BP Location: Left arm, Patient Position: Chair, Cuff Size: Adult Regular)   Pulse 84   Temp 98.2  F (36.8  C) (Oral)   Ht 1.753 m (5' 9\")   Wt 78.5 kg (173 lb)   SpO2 98%   Breastfeeding No   BMI 25.55 kg/m    Body mass index is 25.55 kg/m .  Physical Exam   GENERAL: healthy, alert and no distress  NECK: no adenopathy, no asymmetry, masses, or scars and thyroid normal to palpation  RESP: lungs clear to auscultation - no rales, rhonchi or wheezes  CV: regular rate and rhythm, normal S1 S2, no S3 or S4, no murmur, click or rub, no peripheral edema and peripheral pulses strong  ABDOMEN: soft, nontender, no hepatosplenomegaly, no masses and bowel sounds normal  MS: no gross musculoskeletal defects noted, no edema  NEURO: Normal strength and tone, mentation intact and speech normal  PSYCH: mentation appears normal, affect normal/bright              "

## 2021-07-13 ENCOUNTER — VIRTUAL VISIT (OUTPATIENT)
Dept: NEUROLOGY | Facility: CLINIC | Age: 46
End: 2021-07-13
Attending: INTERNAL MEDICINE
Payer: COMMERCIAL

## 2021-07-13 DIAGNOSIS — R53.83 FATIGUE, UNSPECIFIED TYPE: ICD-10-CM

## 2021-07-13 DIAGNOSIS — R41.89 COGNITIVE CHANGES: ICD-10-CM

## 2021-07-13 DIAGNOSIS — U07.1 INFECTION DUE TO 2019 NOVEL CORONAVIRUS: ICD-10-CM

## 2021-07-13 PROCEDURE — 99204 OFFICE O/P NEW MOD 45 MIN: CPT | Mod: GT | Performed by: PSYCHIATRY & NEUROLOGY

## 2021-07-13 NOTE — LETTER
7/13/2021       RE: Kathi Linn  [address] [facility] [address]     Dear Colleague,    Thank you for referring your patient, Kathi Linn, to the [facility]. Please see a copy of my visit note below.    Kathi is a 45 year old who is being evaluated via a billable video visit.      How would you like to obtain your AVS? MyChart  If the video visit is dropped, the invitation should be resent by: Send to e-mail at:   Video-Visit Details    Type of service:  Video Visit x 35 min        Platform used for Video Visit: [facility]      Chief Complaint   Patient presents with     Consult     POST COVID VIDEO VISIT      Marcio Ortiz        Again, thank you for allowing me to participate in the care of your patient.      Sincerely,    Alfonso Brian MD

## 2021-07-13 NOTE — PROGRESS NOTES
Service Date: 2021    To Whom It May Concern    RE:      Kathi Garcia  MRN:  [id number]  :   1975    To Whom It May Concern:    This letter certifies that Ms. Kathi Garcia suffers from post-COVID syndrome, and it is recommended that she works half-time for at least 4 weeks in order to allow for recovery to occur.  She is capable of doing that and she has actually been pushing herself to keep a full schedule, but this has aggravated her condition.  Any information can be obtained through me with her prior approval.    Sincerely,    Alfonso Brian MD        D: 2021   T: 2021   MT: al    Name:     KATHI GARCIA  MRN:      [id number]        Account:      [id number]   :      1975           Service Date: 2021       Document: [id number]

## 2021-07-13 NOTE — PROGRESS NOTES
Kathi is a 45 year old who is being evaluated via a billable video visit.      How would you like to obtain your AVS? MyChart  If the video visit is dropped, the invitation should be resent by: Send to e-mail at:   Video-Visit Details    Type of service:  Video Visit x 35 min        Platform used for Video Visit: [employer]      Chief Complaint   Patient presents with     Consult     POST COVID VIDEO VISIT      Marcio Ortiz

## 2021-07-13 NOTE — PROGRESS NOTES
Service Date: 2021    Aida Clement MD  [facility]  [address] [address]    RE:     Kathi Linn  MRN:  [id number]  :   1975    Dear Dr. Clement:    We had the privilege of doing a virtual video visit over 40 minutes on Mrs. Kathi Linn after a COVID attack in April of this year.  She said that , she developed body aches, fever, pressure in the eye.  There was no loss of taste, cough, lightheadedness.  She was seen in the ER and sent home with an oximeter, but never passed 91.  After the COVID symptoms were over, she started to have headaches, fatigue and she continues to have a myriad of symptoms.  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  She is also pushing herself to run and is able to run, but has been really wiped out after the run.  Her headaches are all over as well.  She has been tried on anti-inflammatory diet, which is considered spinach, kale, avocado, no processed salmon, fresh salmon, and fruits.  She feels that this diet has helped her with the generalized constitutional symptoms.  Her past medical history indicates she has been relatively healthy.  She has had a gastric bypass in  with duodenal work, has had mostly elective surgery for herniorrhaphy, abdominal surgery,  and cholecystectomy.    ALLERGIES:  Corticosteroids only.    MEDICATIONS:  Her medication now includes albuterol inhaler, Biotene, calcium and vitamins, ibuprofen, magnesium, Naprosyn, vitamin E, etc.  She says she has lost some weight lately.  She sleeps an awful lot, going through the night and into the morning easy.  She is not interested in stimulants.    FAMILY HISTORY: Indicates the father  of cancer in  with lymphoma, obesity in the father with type 2 diabetes.  Maternal grandmother had congestive failure.  Social drinking, occasionally will have a drink.    She has most trouble with multimodality  tasking.    PHYSICAL EXAMINATION:  Her exam shows a very pleasant woman.  She looks in very good physical shape.  Blood pressure 124/89.  Mental status exam is normal.  Cranial nerves are all normal as well.  Coordination is good.  She is very coordinated with both hands.  She can walk around and do tandem walking very well and walk on heels and toes well.  Coordination is very good as well.    In summary, she does have a number of issues that would make her compatible with long-haulers syndrome.  We recommend that she  a work program and also recommended that she works with our [facility] cognitive retraining program.  The patient will be followed if needed.  We will do a letter for her employer recommending to work half time.  We will see how she does with the cognitive retraining program including the CPT and speech therapy evaluation for word finding.    Thank you for referring her to Neurology.    Sincerely,      Alfonso Brian MD        D: 2021   T: 2021   MT: Norm    Name:     DORCAS GARCIA  MRN:      [id number]        Account:      [id number]   :      1975           Service Date: 2021       Document: [id number]

## 2021-07-23 DIAGNOSIS — R41.89 COGNITIVE CHANGES: Primary | ICD-10-CM

## 2021-07-26 ENCOUNTER — HOSPITAL ENCOUNTER (OUTPATIENT)
Dept: OCCUPATIONAL THERAPY | Facility: CLINIC | Age: 46
End: 2021-07-26
Payer: COMMERCIAL

## 2021-07-26 DIAGNOSIS — Z78.9 IMPAIRED INSTRUMENTAL ACTIVITIES OF DAILY LIVING (IADL): Primary | ICD-10-CM

## 2021-07-26 DIAGNOSIS — R41.89 COGNITIVE CHANGES: ICD-10-CM

## 2021-07-26 PROCEDURE — 97110 THERAPEUTIC EXERCISES: CPT | Mod: GO | Performed by: OCCUPATIONAL THERAPIST

## 2021-07-26 PROCEDURE — 97166 OT EVAL MOD COMPLEX 45 MIN: CPT | Mod: GO | Performed by: OCCUPATIONAL THERAPIST

## 2021-07-26 PROCEDURE — 97535 SELF CARE MNGMENT TRAINING: CPT | Mod: GO | Performed by: OCCUPATIONAL THERAPIST

## 2021-07-29 ENCOUNTER — TELEPHONE (OUTPATIENT)
Dept: NEUROLOGY | Facility: CLINIC | Age: 46
End: 2021-07-29

## 2021-07-29 NOTE — TELEPHONE ENCOUNTER
[facility] Call Center    Phone Message    May a detailed message be left on voicemail: yes     Reason for Call: Patient had an appointment with Dr. Brian on 7/13/21.  Per patient, at the appointment, Dr. Brian wanted patient to quit working for one month.  Patient stated she couldn't do that so they agreed on 4 hours a day of working instead.  Patient is frustrated, mad and wants this resolved.  Patient has sent many messages to Dr. Garcia and regards to getting an updated letter.  Employer needs a letter stating specifically a start date and specifically stating half days 4 hours a day.      Second item patient is frustrated about is that he feels Dr. Brian wasn't listening to her.  She said the letter sent to her Doctor stated incorrect information.  Patient is not running and has not ran since before covid.  She asked Dr. Brian if she could run and Dr. Brian said no.  She didn't say anything besides asking the question.    Patient has a meeting with her employer on Monday, 8/2 and would like to get this resolved by tomorrow, 7/30.    Please reach out to patient.p     Action Taken: Message routed to:  [facility] Neuro    Travel Screening: Not Applicable

## 2021-07-31 NOTE — PROGRESS NOTES
07/26/21 1500   Quick Adds   Type of Visit Initial Outpatient Occupational Therapy Evaluation   General Information   Start Of Care Date 07/26/21   Referring Physician Alfonso Brian MD   Orders Evaluate and treat as indicated   Orders Date 07/23/21   Medical Diagnosis Cognitive Changes/post covid-19 syndrome   Onset of Illness/Injury or Date of Surgery 04/05/21   Surgical/Medical History Reviewed Yes   Additional Occupational Profile Info/Pertinent History of Current Problem Pt is 47 yo female with post covid syndrome with complaints of a variety of symptoms including fatigue, marciano fog, lack of focus, memory loss, headache, sensitivity to light, and confusion.  Pt notes she is easily distracted and has trouble with multi tasking and retaining new information/reading comprehension. Pt has a past med hx that includes two C sections, weight loss surgery, hernia repair, gall baldder removal, tummy tuck  and joint repair. Pt was previously able to hold a high stress job and keep up with a rigid exercise schedule but now has trouble managing either and has stopped running.  Pt lives alone but has adult children in the nearby vicinity.   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    Role/Living Environment   Current Community Support Family/friend caregiver   Patient role/Employment history Employed   Current Living Environment Apartment/condo   Prior Level - ADLS Independent   Prior Responsibilities - IADL Meal Preparation;Housekeeping;Laundry;Shopping;Yardwork;Medication management;Finances;Driving;Work   Role/Living Environment Comments Pt struggles with mutiple symptoms including fatigue/ headache, sluggishness, lack of focus and so forth  (Grown children and friends notice she can't remember before)   Patient/family Goals Statement Pt would like to maximize  return to prior level of function  (Pt used to run 4 miles 3x per week)   Pain   Patient currently in pain Yes   Pain location head ache   Pain rating 2/6   Pain comments Pt notices headache increases if she needs to think hard.   Fall Risk Screen   Fall screen completed by OT   Have you fallen 2 or more times in the past year? No   Have you fallen and had an injury in the past year? No   Is patient a fall risk? No   Abuse Screen (yes response referral indicated)   Feels Unsafe at Home or Work/School no   Feels Threatened by Someone no   Does Anyone Try to Keep You From Having Contact with Others or Doing Things Outside Your Home? no   Physical Signs of Abuse Present no   Cognitive Status Examination   Orientation Orientation to person, place and time   Level of Consciousness Alert   Follows Commands and Answers Questions 100% of the time   Memory Comments Pt notes she notices memory deficits   Cognitive Comment Pt completed MoCA with WNl score of 28/30 with pt noting that she struggled on many questions and required increased time which was a drastic change compared to her previous level of function.   Range of Motion (ROM)   ROM Comments WFL   Strength   Strength Comments WFL   Hand Strength   Left Hand  (pounds) 63 pounds   Right Hand  (pounds) 47 pounds   Hand Strength Comments Strength below average for age and gender, primarily on R,  large difference between strength on R and L   Coordination   Left Hand, Nine Hole Peg Test (seconds) 20   Right Hand, Nine Hole Peg Test (seconds) 24   Coordination Comments fmc BNL compared to norms for age and gender   Bathing   Level of [place] - Bathing independent   Upper Body Dressing   Level of [place]: Dress Upper Body independent   Lower Body Dressing   Level of [place]: Dress Lower Body independent   Toileting   Level of [place]: Toilet independent   Grooming   Level of [place]: Grooming independent   Eating/Self-Feeding   Level of  [place]: Eating independent   Planned Therapy Interventions   Planned Therapy Interventions ADL training;IADL training;Coordination training;Self care/Home management;Strengthening    OT Goal 1   Goal Identifier HEP   Goal Description Pt will demonstrate good follow through at home of a B UE HEP for strength  and  coordination with SBA and min cues to increase functional strength and coordination for ADLs/IADLS (manipulating buttons, zippers, typing skills, etc.)   Target Date 10/23/21    OT Goal 2   Goal Identifier Symptom management strategies   Goal Description Patient will identify and independently demonstrate 3 strategies (computer adaptations, self-awareness and self-management symptoms, etc.) to decrease  post-Covid syndrome symptoms( visual sensitivity, fatigue, forgetfulness, decreased focus , sensitivity to noise, upset stomach, dizziness,mental fog, increased processing time,  and head ache)   Target Date 10/23/21    OT Goal 3   Goal Identifier Energy conservation/fatigue management strategies   Goal Description Patient to verbalize and independently utilize 3 strategies for energy conservation/work simplification and fatigue management for improved independence with ADL/IADLs at home and in the community, and demonstrate use of these tasks during session (Errands List, taking breaks prn during session, etc.)   Target Date 10/23/21   OT Goal 4   Goal Identifier Money management skills/   Goal Description Patient will demonstrate the ability to complete simple to moderately complex money management tasks with 90% accuracy for increased attention to detail and problem solving skills to resume finances primarily in the community.   Target Date 10/23/21   OT Goal 5   Goal Identifier Multi tasking/kitchen tasks   Goal Description Pt to be able to alternate between 2-3 different tasks ( eg bill paying, word puzzle and making phone calls) x 15 minutes with 90% accuracy on all three tasks, to stimulate  return to higher level work, cooking/home care, ability to grocery shop   Target Date 10/23/21    OT Goal 6   Goal Identifier Toleration for work tasks   Goal Description Patient will demonstrate WFLs visual scanning (organized scanning pattern) and visual reaction time skills using compensatory strategies prn, for safe independent community mobility and increased ability tolerate work tasks by scoring WNLs on all modes of the Dynavison and pen/paper scanning tasks.   Target Date 10/23/21   OT Goal 7   Goal Identifier Handwriting AE or techniques   Goal Description Pt will demonstrate at least three handwriting techniques or use of pieces of adaptive equipment for ease of writing to improve handwriting performance.    Target Date 10/23/21   Clinical Impression   Criteria for Skilled Therapeutic Interventions Met Yes, treatment indicated   OT Diagnosis decreased ADLs/IADLs   Influenced by the following impairments Fatigue, decreased fmc, decreased concentration, forgetfulness, light sensitivity, head ache, fogginess,    Assessment of Occupational Performance 3-5 Performance Deficits   Identified Performance Deficits decreased activity tolerance, decreased tolerance for computer screens, decreased abiltiy to multi task,  decreased self care performance requiring more time/ effort   Clinical Decision Making (Complexity) Moderate complexity   Therapy Frequency 1x/wk   Predicted Duration of Therapy Intervention (days/wks) 12 weeks   Risks and Benefits of Treatment have been explained. Yes   Patient, Family & other staff in agreement with plan of care Yes   Clinical Impression Comments Pt will benefit from OT services to address the above goals   Education Assessment   Barriers To Learning No Barriers   Total Evaluation Time   OT Levar Moderate Complexity Minutes (08132) 45

## 2021-08-02 NOTE — TELEPHONE ENCOUNTER
[facility] Call Center    Phone Message    May a detailed message be left on voicemail: yes     Reason for Call: Other: Kathi calling to check status of hearing back from Dr. Brian and care team. Patient states frustration of not hearing back from care team for 3 weeks and not getting a letter written for employer. States that original details that were provided from Dr. Brian would be to go on a leave, but states that it is not specifying what part time is. States that her and Dr. Brian agreed to go to part time work. States that work needs to hear specifically from Dr. Brian what part time is. States that they discussed 4 hours a day. States that they need to know part time and she has yet to receive any documentation. States that she has not gotten a letter and she has sent multiple messages last week alone and none have been responded to and also called.     Patient states that she was told that she would see Physical Therapy per Dr. Brian reccomendations but then was called and said it wasn't physical therapy and it was occupation therapy that she needed, which was delayed for a week. Incorrect documentation to primary care doctor stating that she was running but states that she has not ran since before she had COVID. States that it has been 3 weeks for a letter for a month leave, and no responses from care team from last week. Patient states frustration as she doesn't know where to go. Writer provided number for patient relations. Patient also not sure what follow up care is for this.     Patient states that she would like a recommendation for switching providers as she doesn't feel comfortable seeing Dr. Brian again. Patient wondering what the turn around time would be for this. States that no one has explained expectations for turn around.     States that she is not sure what is typically done for sending letter to employer. Patient assuming this would be a letter for employer.     Patient would like letter updated  to [place].     States that she would like a letter today 8/2. Would like a call back by tomorrow 8/3 from someone with an update on who the new doctor she will be working with is and what the next steps are. States that it took 6 weeks to get appointment with Dr. Brian.     Please advise and call Kathi back at your earliest convenience to discuss further     Action Taken: Other: [facility]    Travel Screening: Not Applicable

## 2021-08-02 NOTE — TELEPHONE ENCOUNTER
11:45 am I returned a call to Kathi and left a voice message asking her to call me back so we can discuss exactly what is needed on her return to work letter.  I also let her know that I held a spot for her with Dr Baca on 9/2 at the [facility].  She is wanting to see a different provider and is expressing a lot of frustration with Dr Brian.

## 2021-08-02 NOTE — TELEPHONE ENCOUNTER
"[facility] Call Center    Phone Message    May a detailed message be left on voicemail: yes     Reason for Call: Other: TIME SENSITIVE:  pt calling the clinic again, so frustrated that she is not getting from Dr. Brain--what she has asked him for:   This is what the pt needs for her HR Employer:    #1  Define Part-time work. If this means 4 hrs/day, Dr. Brian has to state just that (\"4 hrs day, \"half days.\")  #2  Roc has to indicate a \"start date\" for the PT status.    Pt needs this updated letter with these corrections for her employer sent to her asap via [email or web address].     VERY IMPORTANT:   pt saw a letter that Dr. Brian sent to Dr. Clement which indicated that \"pt has been running (since gladys covid)\"  Which, per pt, is FALSE.   pt has NOT been running since gladys covid.  Pt wants this corrected by Dr. Brian. Pt has not been running since before 4/05/21.   And this information sent to Dr. Clement was written by Dr. Brian on 7/15/21 (in his progress notes).  Pt is very upset.       Action Taken: Message routed to:  [facility]    Travel Screening: Not Applicable                                                                      "

## 2021-08-10 ENCOUNTER — CARE COORDINATION (OUTPATIENT)
Dept: NEUROLOGY | Facility: CLINIC | Age: 46
End: 2021-08-10

## 2021-08-10 NOTE — PROGRESS NOTES
FMLA forms signed by Dr Brian.  I scanned the forms into the system and emailed them back to the pt as requested.

## 2021-08-16 ENCOUNTER — OFFICE VISIT (OUTPATIENT)
Dept: FAMILY MEDICINE | Facility: CLINIC | Age: 46
End: 2021-08-16
Payer: COMMERCIAL

## 2021-08-16 VITALS
HEART RATE: 89 BPM | WEIGHT: 172 LBS | DIASTOLIC BLOOD PRESSURE: 88 MMHG | RESPIRATION RATE: 16 BRPM | BODY MASS INDEX: 25.48 KG/M2 | SYSTOLIC BLOOD PRESSURE: 109 MMHG | HEIGHT: 69 IN | TEMPERATURE: 98.1 F | OXYGEN SATURATION: 97 %

## 2021-08-16 DIAGNOSIS — J01.00 ACUTE MAXILLARY SINUSITIS, RECURRENCE NOT SPECIFIED: ICD-10-CM

## 2021-08-16 DIAGNOSIS — J02.9 SORE THROAT: Primary | ICD-10-CM

## 2021-08-16 LAB
DEPRECATED S PYO AG THROAT QL EIA: NEGATIVE
GROUP A STREP BY PCR: NOT DETECTED

## 2021-08-16 PROCEDURE — 87651 STREP A DNA AMP PROBE: CPT | Performed by: PHYSICIAN ASSISTANT

## 2021-08-16 PROCEDURE — 99213 OFFICE O/P EST LOW 20 MIN: CPT | Performed by: PHYSICIAN ASSISTANT

## 2021-08-16 ASSESSMENT — MIFFLIN-ST. JEOR: SCORE: 1474.57

## 2021-08-16 NOTE — PATIENT INSTRUCTIONS
Take the antibiotic if over the counter medications are not helpful within a few days.     Recommend Sudafed and Flonase.      Upper respiratory infections are usually caused by viruses and, sometimes certain bacteria.  Antibiotics don't help the vast majority of people recover any quicker even when caused by a bacteria.  The body will fight this infection but it needs to be treated well in order to help heal itself.  Rest as needed.  It is ok to reduce food intake if appetite is poor but it is quite important to maintain/increase fluid intake.    For cough, dextromethorphan/guaifenesin combinations help loosen secretions and suppress cough safely without significant risk of sedation. Often 2 puffs four times daily of an albuterol inhaler will help with bronchitis.  This is a prescription medicine.    For nasal congestion and sinus pressure, pseudoephedrine (Sudafed) or phenylephrine is often helpful but it can cause elevations in blood pressure and insomnia.  Short courses of a nasal decongestant spray (Afrin or Neosinephrine) can be appropriate but their use should be restricted to 3 days due to the high risk of nasal addiction.    For pain and fevers, acetaminophen (Tylenol) is most appropriate.  Ibuprofen (Advil) or naproxen (Aleve) are useful too and last longer but they can cause elevation of blood pressure or stomach problems.    Antihistamines (Benadryl, Dimetapp, etc.) cause sedation, confusion, bowel and urinary abnormalities and are of little use for infectious causes of cough and nasal congestion.  Their use should be reserved for allergic symptoms.

## 2021-08-16 NOTE — PROGRESS NOTES
Assessment & Plan     Sore throat    Likely from drainage.    - Streptococcus A Rapid Scr w Reflx to PCR - Lab Collect; Future  - Streptococcus A Rapid Scr w Reflx to PCR - Lab Collect  - Group A Streptococcus PCR Throat Swab      Acute maxillary sinusitis, recurrence not specified    Likely viral. Since one sided, consider treating with antibiotic if over the counter medications are not helpful. Use sudafed or other sinus medications. Also can try Flonase.    - amoxicillin-clavulanate (AUGMENTIN) 875-125 MG tablet; Take 1 tablet by mouth 2 times daily for 10 days             Patient Instructions   Take the antibiotic if over the counter medications are not helpful within a few days.     Recommend Sudafed and Flonase.      Upper respiratory infections are usually caused by viruses and, sometimes certain bacteria.  Antibiotics don't help the vast majority of people recover any quicker even when caused by a bacteria.  The body will fight this infection but it needs to be treated well in order to help heal itself.  Rest as needed.  It is ok to reduce food intake if appetite is poor but it is quite important to maintain/increase fluid intake.    For cough, dextromethorphan/guaifenesin combinations help loosen secretions and suppress cough safely without significant risk of sedation. Often 2 puffs four times daily of an albuterol inhaler will help with bronchitis.  This is a prescription medicine.    For nasal congestion and sinus pressure, pseudoephedrine (Sudafed) or phenylephrine is often helpful but it can cause elevations in blood pressure and insomnia.  Short courses of a nasal decongestant spray (Afrin or Neosinephrine) can be appropriate but their use should be restricted to 3 days due to the high risk of nasal addiction.    For pain and fevers, acetaminophen (Tylenol) is most appropriate.  Ibuprofen (Advil) or naproxen (Aleve) are useful too and last longer but they can cause elevation of blood pressure or  "stomach problems.    Antihistamines (Benadryl, Dimetapp, etc.) cause sedation, confusion, bowel and urinary abnormalities and are of little use for infectious causes of cough and nasal congestion.  Their use should be reserved for allergic symptoms.        Return if symptoms worsen or fail to improve.    [facility]    Jessy Armenta is a 46 year old who presents for the following health issues     HPI     Acute Illness  Acute illness concerns: sore throat   Onset/Duration: 3 days ago  Symptoms:  Fever: no  Chills/Sweats: no  Headache (location?): YES- left side  Sinus Pressure: no  Conjunctivitis:  no  Ear Pain: YES: left  Rhinorrhea: no  Congestion: YES- left side  Sore Throat: YES- with painful swallowing  Cough: no  Wheeze: no  Decreased Appetite: no  Nausea: no  Vomiting: no  Diarrhea: no  Dysuria/Freq.: no  Dysuria or Hematuria: no  Fatigue/Achiness: no  Sick/Strep Exposure: no  Therapies tried and outcome: Tylenol, ibuprofen without much improvement      Review of Systems   Constitutional, HEENT, cardiovascular, pulmonary, gi and gu systems are negative, except as otherwise noted.        Objective    /88 (BP Location: Right arm, Patient Position: Sitting, Cuff Size: Adult Regular)   Pulse 89   Temp 98.1  F (36.7  C) (Oral)   Resp 16   Ht 1.753 m (5' 9\")   Wt 78 kg (172 lb)   SpO2 97%   BMI 25.40 kg/m    Body mass index is 25.4 kg/m .       Physical Exam   GENERAL: healthy, alert and no distress  EYES: Eyes grossly normal to inspection, PERRL and conjunctivae and sclerae normal  HENT: normal cephalic/atraumatic, right ear: normal: no effusions, no erythema, normal landmarks, left ear: clear effusion, nose and mouth without ulcers or lesions, oropharynx clear and oral mucous membranes moist  RESP: lungs clear to auscultation - no rales, rhonchi or wheezes  CV: regular rate and rhythm, normal S1 S2, no S3 or S4, no murmur, click or rub, no " peripheral edema and peripheral pulses strong  MS: no gross musculoskeletal defects noted, no edema  SKIN: no suspicious lesions or rashes  NEURO: Normal strength and tone, mentation intact and speech normal  PSYCH: mentation appears normal, affect normal/bright  LYMPH: no cervical, supraclavicular, axillary, or inguinal adenopathy    Results for orders placed or performed in visit on 08/16/21 (from the past 24 hour(s))   Streptococcus A Rapid Scr w Reflx to PCR - Lab Collect    Specimen: Throat; Swab   Result Value Ref Range    Group A Strep antigen Negative Negative

## 2021-09-01 ENCOUNTER — HOSPITAL ENCOUNTER (OUTPATIENT)
Dept: OCCUPATIONAL THERAPY | Facility: CLINIC | Age: 46
End: 2021-09-01
Payer: COMMERCIAL

## 2021-09-01 DIAGNOSIS — R41.89 COGNITIVE CHANGES: Primary | ICD-10-CM

## 2021-09-01 DIAGNOSIS — Z78.9 IMPAIRED INSTRUMENTAL ACTIVITIES OF DAILY LIVING (IADL): ICD-10-CM

## 2021-09-01 PROCEDURE — 97110 THERAPEUTIC EXERCISES: CPT | Mod: GO | Performed by: OCCUPATIONAL THERAPIST

## 2021-09-01 PROCEDURE — 97535 SELF CARE MNGMENT TRAINING: CPT | Mod: GO | Performed by: OCCUPATIONAL THERAPIST

## 2021-09-02 ENCOUNTER — OFFICE VISIT (OUTPATIENT)
Dept: NEUROLOGY | Facility: CLINIC | Age: 46
End: 2021-09-02
Payer: COMMERCIAL

## 2021-09-02 VITALS
WEIGHT: 171 LBS | HEART RATE: 57 BPM | DIASTOLIC BLOOD PRESSURE: 85 MMHG | SYSTOLIC BLOOD PRESSURE: 126 MMHG | BODY MASS INDEX: 25.25 KG/M2

## 2021-09-02 DIAGNOSIS — U07.1 2019 NOVEL CORONAVIRUS DISEASE (COVID-19): Primary | ICD-10-CM

## 2021-09-02 DIAGNOSIS — G93.31 POSTVIRAL FATIGUE SYNDROME: ICD-10-CM

## 2021-09-02 DIAGNOSIS — R41.3 MEMORY CHANGE: ICD-10-CM

## 2021-09-02 PROCEDURE — 99215 OFFICE O/P EST HI 40 MIN: CPT | Performed by: INTERNAL MEDICINE

## 2021-09-02 PROCEDURE — 99417 PROLNG OP E/M EACH 15 MIN: CPT | Performed by: INTERNAL MEDICINE

## 2021-09-02 ASSESSMENT — PAIN SCALES - GENERAL: PAINLEVEL: MODERATE PAIN (4)

## 2021-09-02 NOTE — LETTER
"    9/2/2021         RE: Kathi Linn  [address] [address] [facility] [address]        Dear Colleague,    Thank you for referring your patient, Kathi Linn, to the [facility] [place]. Please see a copy of my visit note below.    [facility] Neurology Consultation    Kathi Linn MRN# [id number]   Age: 46 year old YOB: 1975     Requesting physician: No ref. provider found  Aida Clement     Reason for Consultation: post COVID symptoms      History of Presenting Symptoms:   Kathi Linn is a 46 year old female who presents today for evaluation of post COVID symptoms.     Patient had COVID in 4/2021. In the first week she had severe body aches, chest tightness, body aches, fevers. In the second week she had worsening of chest tightness and she was found to have pneumonia and severe fatigue. She had some improvement by the 4th week, but has had lingering symptoms since then. She continues to have fatigue, lack of energy, and cognitive concerns. Cognitively she forgets a lot. Her speech is \"hit or miss\". She will have a hard time thinking of words. She has more pauses in her speech. This is worse if she is more busy at work. She gets easily distracted. 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   She is doing cognitive therapy with occupational therapy.        Previously she was a 6-7 hour sleeper. Now she sleeps about 9 hours. She naps more frequently compared to previous.      Things are mildly improved compared to a few months ago, but it has been a very slow recovery.     She previously was running 3-5 times a week, about 4 miles each time. Now she tries to do some walks, but she has to force herself to do it.    She gets daily headaches. They are worse when she is more cognitively stressed. Occasionally she will take a medication, ibuprofen or tylenol, which helps. This new prior to COVID.    She doesn't " feel overly depressed, but isn't as motivated. She still gets enjoyment out of certain activities. Her son got  last weekend, which was enjoyable.       Past Medical History:     Patient Active Problem List   Diagnosis   (none) - all problems resolved or deleted     Past Medical History:   Diagnosis Date     Uncomplicated asthma 2006        Past Surgical History:     Past Surgical History:   Procedure Laterality Date     ABDOMEN SURGERY      DUODENAL SWITCH       C/SECTION, CLASSICAL  ,      CHOLECYSTECTOMY       HERNIORRHAPHY VENTRAL  10/2/2013    Procedure: HERNIORRHAPHY VENTRAL;  Open Ventral hernia Repair with mesh;  Surgeon: Orly Dunlap MD;  Location:  OR        Social History:     Social History     Tobacco Use     Smoking status: Former Smoker     Packs/day: 0.10     Years: 2.00     Pack years: 0.20     Types: Cigarettes, Clove cigarettes or kreteks     Quit date: 1998     Years since quittin.6     Smokeless tobacco: Never Used     Tobacco comment: QUIT in    Vaping Use     Vaping Use: Never used   Substance Use Topics     Alcohol use: Yes     Alcohol/week: 0.0 standard drinks     Comment: 3 DRINKS PER WEEK     Drug use: No        Family History:     Family History   Problem Relation Age of Onset     Lymphoma Father      Other Cancer Father          1997     Obesity Father      Hypertension Mother      Cerebrovascular Disease Mother      Depression Mother      Osteoporosis Mother      Depression Sister      Cerebrovascular Disease Paternal Aunt      Cerebrovascular Disease Maternal Grandmother      Melanoma Maternal Grandmother      Depression Maternal Grandmother      Lung Cancer Paternal Grandmother      Lung Cancer Maternal Grandfather      Breast Cancer Paternal Aunt         post-menopausal     Hypertension Sister      Depression Sister         Medications:     Current Outpatient Medications   Medication Sig     albuterol (PROAIR  HFA/PROVENTIL HFA/VENTOLIN HFA) 108 (90 BASE) MCG/ACT Inhaler Inhale 1-2 puffs into the lungs every 4 hours as needed for shortness of breath / dyspnea or wheezing     augmented betamethasone dipropionate (DIPROLENE) 0.05 % external lotion Apply to scalp once per day for 7 days and then when needed     BIOTIN 1 tablet daily      CALCIUM 500 MG OR TABS      cholecalciferol (VITAMIN D3) 5000 UNITS CAPS capsule Take 5,000 Units by mouth daily     ferrous gluconate (FERGON) 324 (38 FE) MG tablet Take 1 tablet (324 mg) by mouth 2 times daily     fluocinolone (SYNALAR) 0.01 % solution Apply to scalp once per day for 7 days then when needed     ibuprofen (ADVIL,MOTRIN) 200 MG tablet Take 1-2 tablets (200-400 mg) by mouth every 6 hours as needed for other (mild pain)     Magnesium Hydroxide (MAGNESIA PO) Take 1 tablet by mouth daily      MULTIVITAMIN TABS   OR      naproxen sodium (ANAPROX) 550 MG tablet      triamcinolone (KENALOG) 0.1 % external cream Apply topically 2 times daily Apply to AA on neck, hands bid for 10-14 days then when needed     VITAMIN A EX 5000 1 tab qd      VITAMIN E 1 tablet daily.     VITAMIN K PO Take 1 tablet by mouth daily.     Zinc Acetate, Oral, (ZINC ACETATE PO)      No current facility-administered medications for this visit.        Allergies:     Allergies   Allergen Reactions     Corticosteroids      flovent, pulmacort rash on face        Review of Systems:   As above      Physical Exam:   Vitals: /85 (BP Location: Right arm, Patient Position: Sitting, Cuff Size: Adult Regular)   Pulse 57   Wt 77.6 kg (171 lb)   BMI 25.25 kg/m     General: Seated comfortably in no acute distress.  HEENT: Optic discs sharp and vasculature normal on funduscopic exam.   Lungs: breathing comfortably  Extremities: no edema  Skin: No rashes  Neurologic:     Mental Status: Fully alert, attentive and oriented. Normal memory and fund of knowledge. Language normal, speech clear and fluent, no paraphasic  errors. MoCA 29/30 (-1 delayed recall).     Cranial Nerves: Visual fields intact. PERRL. EOMI with normal smooth pursuit. Facial sensation intact/symmetric. Facial movements symmetric. Hearing not formally tested but intact to conversation. Palate elevation symmetric, uvula midline. No dysarthria. Shoulder shrug strong bilaterally. Tongue protrusion midline.     Motor: No tremors or other abnormal movements observed. Muscle tone normal throughout. Normal/symmetric rapid finger tapping. Strength 5/5 throughout upper and lower extremities.     Deep Tendon Reflexes: 2+/symmetric throughout upper and lower extremities. No clonus.      Sensory: Intact/symmetric to light touch, temperature, vibration throughout upper and lower extremities. Negative Romberg.      Coordination: Finger-nose-finger and heel-shin intact without dysmetria. Rapid alternating movements intact/symmetric with normal speed and rhythm.     Gait: Normal, steady casual gait. Able to walk on toes, heels and tandem without difficulty.         Data: Pertinent prior to visit   Imaging:  CT chest 4/2021  IMPRESSION:  1.  No evidence for pulmonary embolism.   2.  Moderate pneumonia, appearance compatible with COVID 19 pneumonia.    Procedures:  EKG sinus rhythm 4/2021    Laboratory:  TSH normal 12/2020  CBC/CMP unremarkable 4/2021         Assessment and Plan:   Assessment:  Kathi Linn is a 46 year old female who presents today for evaluation of post COVID symptoms. Patient had COVID in 4/2021. She has had lingering fatigue and cognitive complaints since then. Symptoms are compatible with post COVID syndrome. We discussed doing lab work today to evaluate for alternative metabolic causes of cognitive complaints. She is currently in OT for cognitive rehab. She is interested in starting the PT post COVID program as well. Given symptoms she should continue part-time 4 hours per day, 5 days per week for the next month. After that we will re-evaluate possibly  reducing work restrictions.       Plan:  - PT post COVID program  - CBC, CMP, B12, TSH    Follow up in Neurology clinic in 2 months or earlier as needed should new concerns arise.    Jose Angel Baca MD   of Neurology  [facility]      The total time of this encounter today amounted to 70 minutes. This time included time spent with the patient, prep work, ordering tests, and performing post visit documentation.        Again, thank you for allowing me to participate in the care of your patient.        Sincerely,        Jose Angel Baca MD

## 2021-09-02 NOTE — PROGRESS NOTES
"[facility] Neurology Consultation    Kathi Linn MRN# [id number]   Age: 46 year old YOB: 1975     Requesting physician: No ref. provider found  Aida Clement     Reason for Consultation: post COVID symptoms      History of Presenting Symptoms:   Kathi Linn is a 46 year old female who presents today for evaluation of post COVID symptoms.     Patient had COVID in 4/2021. In the first week she had severe body aches, chest tightness, body aches, fevers. In the second week she had worsening of chest tightness and she was found to have pneumonia and severe fatigue. She had some improvement by the 4th week, but has had lingering symptoms since then. She continues to have fatigue, lack of energy, and cognitive concerns. Cognitively she forgets a lot. Her speech is \"hit or miss\". She will have a hard time thinking of words. She has more pauses in her speech. This is worse if she is more busy at work. She gets easily distracted. 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   She is doing cognitive therapy with occupational therapy.        Previously she was a 6-7 hour sleeper. Now she sleeps about 9 hours. She naps more frequently compared to previous.      Things are mildly improved compared to a few months ago, but it has been a very slow recovery.     She previously was running 3-5 times a week, about 4 miles each time. Now she tries to do some walks, but she has to force herself to do it.    She gets daily headaches. They are worse when she is more cognitively stressed. Occasionally she will take a medication, ibuprofen or tylenol, which helps. This new prior to COVID.    She doesn't feel overly depressed, but isn't as motivated. She still gets enjoyment out of certain activities. Her son got  last weekend, which was enjoyable.       Past Medical History:     Patient Active Problem List   Diagnosis   (none) - all problems resolved or " deleted     Past Medical History:   Diagnosis Date     Uncomplicated asthma 2006        Past Surgical History:     Past Surgical History:   Procedure Laterality Date     ABDOMEN SURGERY      DUODENAL SWITCH       C/SECTION, CLASSICAL  ,      CHOLECYSTECTOMY       HERNIORRHAPHY VENTRAL  10/2/2013    Procedure: HERNIORRHAPHY VENTRAL;  Open Ventral hernia Repair with mesh;  Surgeon: Orly Dunlap MD;  Location:  OR        Social History:     Social History     Tobacco Use     Smoking status: Former Smoker     Packs/day: 0.10     Years: 2.00     Pack years: 0.20     Types: Cigarettes, Clove cigarettes or kreteks     Quit date: 1998     Years since quittin.6     Smokeless tobacco: Never Used     Tobacco comment: QUIT in    Vaping Use     Vaping Use: Never used   Substance Use Topics     Alcohol use: Yes     Alcohol/week: 0.0 standard drinks     Comment: 3 DRINKS PER WEEK     Drug use: No        Family History:     Family History   Problem Relation Age of Onset     Lymphoma Father      Other Cancer Father          1997     Obesity Father      Hypertension Mother      Cerebrovascular Disease Mother      Depression Mother      Osteoporosis Mother      Depression Sister      Cerebrovascular Disease Paternal Aunt      Cerebrovascular Disease Maternal Grandmother      Melanoma Maternal Grandmother      Depression Maternal Grandmother      Lung Cancer Paternal Grandmother      Lung Cancer Maternal Grandfather      Breast Cancer Paternal Aunt         post-menopausal     Hypertension Sister      Depression Sister         Medications:     Current Outpatient Medications   Medication Sig     albuterol (PROAIR HFA/PROVENTIL HFA/VENTOLIN HFA) 108 (90 BASE) MCG/ACT Inhaler Inhale 1-2 puffs into the lungs every 4 hours as needed for shortness of breath / dyspnea or wheezing     augmented betamethasone dipropionate (DIPROLENE) 0.05 % external lotion Apply to scalp once per day for 7  days and then when needed     BIOTIN 1 tablet daily      CALCIUM 500 MG OR TABS      cholecalciferol (VITAMIN D3) 5000 UNITS CAPS capsule Take 5,000 Units by mouth daily     ferrous gluconate (FERGON) 324 (38 FE) MG tablet Take 1 tablet (324 mg) by mouth 2 times daily     fluocinolone (SYNALAR) 0.01 % solution Apply to scalp once per day for 7 days then when needed     ibuprofen (ADVIL,MOTRIN) 200 MG tablet Take 1-2 tablets (200-400 mg) by mouth every 6 hours as needed for other (mild pain)     Magnesium Hydroxide (MAGNESIA PO) Take 1 tablet by mouth daily      MULTIVITAMIN TABS   OR      naproxen sodium (ANAPROX) 550 MG tablet      triamcinolone (KENALOG) 0.1 % external cream Apply topically 2 times daily Apply to AA on neck, hands bid for 10-14 days then when needed     VITAMIN A EX 5000 1 tab qd      VITAMIN E 1 tablet daily.     VITAMIN K PO Take 1 tablet by mouth daily.     Zinc Acetate, Oral, (ZINC ACETATE PO)      No current facility-administered medications for this visit.        Allergies:     Allergies   Allergen Reactions     Corticosteroids      flovent, pulmacort rash on face        Review of Systems:   As above      Physical Exam:   Vitals: /85 (BP Location: Right arm, Patient Position: Sitting, Cuff Size: Adult Regular)   Pulse 57   Wt 77.6 kg (171 lb)   BMI 25.25 kg/m     General: Seated comfortably in no acute distress.  HEENT: Optic discs sharp and vasculature normal on funduscopic exam.   Lungs: breathing comfortably  Extremities: no edema  Skin: No rashes  Neurologic:     Mental Status: Fully alert, attentive and oriented. Normal memory and fund of knowledge. Language normal, speech clear and fluent, no paraphasic errors. MoCA 29/30 (-1 delayed recall).     Cranial Nerves: Visual fields intact. PERRL. EOMI with normal smooth pursuit. Facial sensation intact/symmetric. Facial movements symmetric. Hearing not formally tested but intact to conversation. Palate elevation symmetric, uvula  midline. No dysarthria. Shoulder shrug strong bilaterally. Tongue protrusion midline.     Motor: No tremors or other abnormal movements observed. Muscle tone normal throughout. Normal/symmetric rapid finger tapping. Strength 5/5 throughout upper and lower extremities.     Deep Tendon Reflexes: 2+/symmetric throughout upper and lower extremities. No clonus.      Sensory: Intact/symmetric to light touch, temperature, vibration throughout upper and lower extremities. Negative Romberg.      Coordination: Finger-nose-finger and heel-shin intact without dysmetria. Rapid alternating movements intact/symmetric with normal speed and rhythm.     Gait: Normal, steady casual gait. Able to walk on toes, heels and tandem without difficulty.         Data: Pertinent prior to visit   Imaging:  CT chest 4/2021  IMPRESSION:  1.  No evidence for pulmonary embolism.   2.  Moderate pneumonia, appearance compatible with COVID 19 pneumonia.    Procedures:  EKG sinus rhythm 4/2021    Laboratory:  TSH normal 12/2020  CBC/CMP unremarkable 4/2021         Assessment and Plan:   Assessment:  Kathi Linn is a 46 year old female who presents today for evaluation of post COVID symptoms. Patient had COVID in 4/2021. She has had lingering fatigue and cognitive complaints since then. Symptoms are compatible with post COVID syndrome. We discussed doing lab work today to evaluate for alternative metabolic causes of cognitive complaints. She is currently in OT for cognitive rehab. She is interested in starting the PT post COVID program as well. Given symptoms she should continue part-time 4 hours per day, 5 days per week for the next month. After that we will re-evaluate possibly reducing work restrictions.       Plan:  - PT post COVID program  - CBC, CMP, B12, TSH    Follow up in Neurology clinic in 2 months or earlier as needed should new concerns arise.    Jose Angel Baca MD   of Neurology  [facility]      The total  time of this encounter today amounted to 70 minutes. This time included time spent with the patient, prep work, ordering tests, and performing post visit documentation.

## 2021-09-02 NOTE — LETTER
September 2, 2021    Kathi Linn  [address] [facility] [address]    To whom it may concern,     Kathi is a patient of mine who follows in Neurology clinic with diagnosis of post-COVID syndrome. She has significant fatigue and cognitive difficulties that impair her from working full time at her job. She is currently allowed to work 4 hours per day, 5 days a week. I would like to continue these work restrictions for the next month through October 1st. At this junction we will re-evaluate any work restrictions.     Please reach out to me with any questions or concerns.       Jose Angel Baca MD   of Neurology  [facility]

## 2021-09-04 ENCOUNTER — HEALTH MAINTENANCE LETTER (OUTPATIENT)
Age: 46
End: 2021-09-04

## 2021-09-07 ENCOUNTER — LAB (OUTPATIENT)
Dept: LAB | Facility: CLINIC | Age: 46
End: 2021-09-07
Payer: COMMERCIAL

## 2021-09-07 DIAGNOSIS — R41.3 MEMORY CHANGE: ICD-10-CM

## 2021-09-07 LAB
ERYTHROCYTE [DISTWIDTH] IN BLOOD BY AUTOMATED COUNT: 11.8 % (ref 10–15)
HCT VFR BLD AUTO: 38 % (ref 35–47)
HGB BLD-MCNC: 12.5 G/DL (ref 11.7–15.7)
MCH RBC QN AUTO: 30.6 PG (ref 26.5–33)
MCHC RBC AUTO-ENTMCNC: 32.9 G/DL (ref 31.5–36.5)
MCV RBC AUTO: 93 FL (ref 78–100)
PLATELET # BLD AUTO: 288 10E3/UL (ref 150–450)
RBC # BLD AUTO: 4.09 10E6/UL (ref 3.8–5.2)
WBC # BLD AUTO: 6.8 10E3/UL (ref 4–11)

## 2021-09-07 PROCEDURE — 36415 COLL VENOUS BLD VENIPUNCTURE: CPT

## 2021-09-07 PROCEDURE — 80053 COMPREHEN METABOLIC PANEL: CPT

## 2021-09-07 PROCEDURE — 82607 VITAMIN B-12: CPT

## 2021-09-07 PROCEDURE — 85027 COMPLETE CBC AUTOMATED: CPT

## 2021-09-07 PROCEDURE — 84443 ASSAY THYROID STIM HORMONE: CPT

## 2021-09-08 LAB
ALBUMIN SERPL-MCNC: 3.2 G/DL (ref 3.4–5)
ALP SERPL-CCNC: 77 U/L (ref 40–150)
ALT SERPL W P-5'-P-CCNC: 31 U/L (ref 0–50)
ANION GAP SERPL CALCULATED.3IONS-SCNC: 6 MMOL/L (ref 3–14)
AST SERPL W P-5'-P-CCNC: 23 U/L (ref 0–45)
BILIRUB SERPL-MCNC: 1 MG/DL (ref 0.2–1.3)
BUN SERPL-MCNC: 13 MG/DL (ref 7–30)
CALCIUM SERPL-MCNC: 8 MG/DL (ref 8.5–10.1)
CHLORIDE BLD-SCNC: 102 MMOL/L (ref 94–109)
CO2 SERPL-SCNC: 24 MMOL/L (ref 20–32)
CREAT SERPL-MCNC: 0.84 MG/DL (ref 0.52–1.04)
GFR SERPL CREATININE-BSD FRML MDRD: 82 ML/MIN/1.73M2
GLUCOSE BLD-MCNC: 72 MG/DL (ref 70–99)
POTASSIUM BLD-SCNC: 4.3 MMOL/L (ref 3.4–5.3)
PROT SERPL-MCNC: 6.9 G/DL (ref 6.8–8.8)
SODIUM SERPL-SCNC: 132 MMOL/L (ref 133–144)
TSH SERPL DL<=0.005 MIU/L-ACNC: 1.88 MU/L (ref 0.4–4)
VIT B12 SERPL-MCNC: 679 PG/ML (ref 193–986)

## 2021-09-15 ENCOUNTER — HOSPITAL ENCOUNTER (OUTPATIENT)
Dept: OCCUPATIONAL THERAPY | Facility: CLINIC | Age: 46
End: 2021-09-15
Payer: COMMERCIAL

## 2021-09-15 DIAGNOSIS — Z78.9 IMPAIRED INSTRUMENTAL ACTIVITIES OF DAILY LIVING (IADL): ICD-10-CM

## 2021-09-15 DIAGNOSIS — R41.89 COGNITIVE CHANGES: Primary | ICD-10-CM

## 2021-09-15 PROCEDURE — 97535 SELF CARE MNGMENT TRAINING: CPT | Mod: GO | Performed by: OCCUPATIONAL THERAPIST

## 2021-09-20 ENCOUNTER — HOSPITAL ENCOUNTER (OUTPATIENT)
Dept: PHYSICAL THERAPY | Facility: CLINIC | Age: 46
End: 2021-09-20
Attending: INTERNAL MEDICINE
Payer: COMMERCIAL

## 2021-09-20 DIAGNOSIS — U07.1 2019 NOVEL CORONAVIRUS DISEASE (COVID-19): ICD-10-CM

## 2021-09-20 DIAGNOSIS — R53.1 DECREASED STRENGTH, ENDURANCE, AND MOBILITY: ICD-10-CM

## 2021-09-20 DIAGNOSIS — Z74.09 DECREASED STRENGTH, ENDURANCE, AND MOBILITY: ICD-10-CM

## 2021-09-20 DIAGNOSIS — G93.31 POSTVIRAL FATIGUE SYNDROME: Primary | ICD-10-CM

## 2021-09-20 DIAGNOSIS — R68.89 DECREASED STRENGTH, ENDURANCE, AND MOBILITY: ICD-10-CM

## 2021-09-20 PROCEDURE — 97112 NEUROMUSCULAR REEDUCATION: CPT | Mod: GP | Performed by: PHYSICAL THERAPIST

## 2021-09-20 PROCEDURE — 97161 PT EVAL LOW COMPLEX 20 MIN: CPT | Mod: GP | Performed by: PHYSICAL THERAPIST

## 2021-09-20 PROCEDURE — 97110 THERAPEUTIC EXERCISES: CPT | Mod: GP | Performed by: PHYSICAL THERAPIST

## 2021-09-20 ASSESSMENT — 6 MINUTE WALK TEST (6MWT): COMMENTS: NEXT SESSION
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
## 2021-09-28 ENCOUNTER — HOSPITAL ENCOUNTER (OUTPATIENT)
Dept: PHYSICAL THERAPY | Facility: CLINIC | Age: 46
End: 2021-09-28
Payer: COMMERCIAL

## 2021-09-28 DIAGNOSIS — U07.1 2019 NOVEL CORONAVIRUS DISEASE (COVID-19): ICD-10-CM

## 2021-09-28 DIAGNOSIS — Z74.09 DECREASED STRENGTH, ENDURANCE, AND MOBILITY: Primary | ICD-10-CM

## 2021-09-28 DIAGNOSIS — R53.1 DECREASED STRENGTH, ENDURANCE, AND MOBILITY: Primary | ICD-10-CM

## 2021-09-28 DIAGNOSIS — R68.89 DECREASED STRENGTH, ENDURANCE, AND MOBILITY: Primary | ICD-10-CM

## 2021-09-28 DIAGNOSIS — G93.31 POSTVIRAL FATIGUE SYNDROME: ICD-10-CM

## 2021-09-28 PROCEDURE — 97110 THERAPEUTIC EXERCISES: CPT | Mod: GP | Performed by: PHYSICAL THERAPIST

## 2021-09-30 ENCOUNTER — HOSPITAL ENCOUNTER (OUTPATIENT)
Dept: OCCUPATIONAL THERAPY | Facility: CLINIC | Age: 46
End: 2021-09-30
Payer: COMMERCIAL

## 2021-09-30 DIAGNOSIS — R41.89 COGNITIVE CHANGES: Primary | ICD-10-CM

## 2021-09-30 DIAGNOSIS — Z78.9 IMPAIRED INSTRUMENTAL ACTIVITIES OF DAILY LIVING (IADL): ICD-10-CM

## 2021-09-30 PROCEDURE — 97535 SELF CARE MNGMENT TRAINING: CPT | Mod: GO | Performed by: OCCUPATIONAL THERAPIST

## 2021-09-30 PROCEDURE — 97110 THERAPEUTIC EXERCISES: CPT | Mod: GO | Performed by: OCCUPATIONAL THERAPIST

## 2021-10-11 ENCOUNTER — HOSPITAL ENCOUNTER (OUTPATIENT)
Dept: PHYSICAL THERAPY | Facility: CLINIC | Age: 46
End: 2021-10-11
Payer: COMMERCIAL

## 2021-10-11 DIAGNOSIS — R68.89 DECREASED STRENGTH, ENDURANCE, AND MOBILITY: Primary | ICD-10-CM

## 2021-10-11 DIAGNOSIS — R53.1 DECREASED STRENGTH, ENDURANCE, AND MOBILITY: Primary | ICD-10-CM

## 2021-10-11 DIAGNOSIS — Z74.09 DECREASED STRENGTH, ENDURANCE, AND MOBILITY: Primary | ICD-10-CM

## 2021-10-11 PROCEDURE — 97110 THERAPEUTIC EXERCISES: CPT | Mod: GP | Performed by: PHYSICAL THERAPIST

## 2021-10-11 PROCEDURE — 97530 THERAPEUTIC ACTIVITIES: CPT | Mod: GP | Performed by: PHYSICAL THERAPIST

## 2021-10-13 ENCOUNTER — HOSPITAL ENCOUNTER (OUTPATIENT)
Dept: OCCUPATIONAL THERAPY | Facility: CLINIC | Age: 46
End: 2021-10-13
Payer: COMMERCIAL

## 2021-10-13 DIAGNOSIS — R41.89 COGNITIVE CHANGES: Primary | ICD-10-CM

## 2021-10-13 DIAGNOSIS — Z78.9 IMPAIRED INSTRUMENTAL ACTIVITIES OF DAILY LIVING (IADL): ICD-10-CM

## 2021-10-13 PROCEDURE — 97110 THERAPEUTIC EXERCISES: CPT | Mod: GO | Performed by: OCCUPATIONAL THERAPIST

## 2021-10-13 PROCEDURE — 97535 SELF CARE MNGMENT TRAINING: CPT | Mod: GO | Performed by: OCCUPATIONAL THERAPIST

## 2021-10-20 ENCOUNTER — HOSPITAL ENCOUNTER (OUTPATIENT)
Dept: OCCUPATIONAL THERAPY | Facility: CLINIC | Age: 46
End: 2021-10-20
Payer: COMMERCIAL

## 2021-10-20 DIAGNOSIS — R41.89 COGNITIVE CHANGES: Primary | ICD-10-CM

## 2021-10-20 DIAGNOSIS — Z78.9 IMPAIRED INSTRUMENTAL ACTIVITIES OF DAILY LIVING (IADL): ICD-10-CM

## 2021-10-20 PROCEDURE — 97535 SELF CARE MNGMENT TRAINING: CPT | Mod: GO | Performed by: OCCUPATIONAL THERAPIST

## 2021-10-20 PROCEDURE — 97110 THERAPEUTIC EXERCISES: CPT | Mod: GO | Performed by: OCCUPATIONAL THERAPIST

## 2021-10-25 NOTE — PROGRESS NOTES
Outpatient Occupational Therapy Discharge Note     Patient: Kathi Linn  : 1975    Beginning/End Dates of Reporting Period:  2021 to 10/20/2021    Referring Provider: Alfonso Brian MD    Therapy Diagnosis: decreased AdLs/IADLs    Client Self Report: Pt notes that her business trip in [place] was a challenge, sitting through presentations and she found she needed to take rest breaks to get through.     Objective Measurements:     Objective Measure: FACIT   Details: 38  WNL where WNL is 40 plus or minus 10.  An increase of 28 points since evaluaiton   Objective Measure:    Details: R 94# L 75 #  WNL   Objective Measure: 9HPT   Details: R 19 sec L 21 sec WNL   Objective Measure: Dynavision 10/13   Details:  Mode A 66 Mode B 67  Mode B with divided attention 63 10/10  All WNL                          Goals:     Goal Identifier HEP   Goal Description Pt will demonstrate good follow through at home of a B UE HEP for strength  and  coordination with SBA and min cues to increase functional strength and coordination for ADLs/IADLS (manipulating buttons, zippers, typing skills, etc.)   Target Date 10/23/21   Date Met  10/20/21   Progress (detail required for progress note): WNL  and FMC see objective measures     Goal Identifier Symptom management strategies   Goal Description Patient will identify and independently demonstrate 3 strategies (computer adaptations, self-awareness and self-management symptoms, etc.) to decrease  post-Covid syndrome symptoms( visual sensitivity, fatigue, forgetfulness, decreased focus , sensitivity to noise, upset stomach, dizziness,mental fog, increased processing time,  and head ache)   Target Date 10/23/21   Date Met  10/20/21   Progress (detail required for progress note):  Pt demonstrated success with implementing strategies learned  In therapy     Goal Identifier Energy conservation/fatigue management strategies   Goal Description Patient to verbalize and  independently utilize 3 strategies for energy conservation/work simplification and fatigue management for improved independence with ADL/IADLs at home and in the community, and demonstrate use of these tasks during session (Errands List, taking breaks prn during session, etc.)   Target Date 10/23/21   Date Met  10/20/21   Progress (detail required for progress note):  Pt reports use of strategies at home for symptom reduction/management     Goal Identifier Money management skills/   Goal Description Patient will demonstrate the ability to complete simple to moderately complex money management tasks with 90% accuracy for increased attention to detail and problem solving skills to resume finances primarily in the community.   Target Date 10/23/21   Date Met  10/20/21   Progress (detail required for progress note):  Pt demonstrates good accuracy with fxl math and return to work tasks     Goal Identifier Multi tasking/kitchen tasks   Goal Description Pt to be able to alternate between 2-3 different tasks ( eg bill paying, word puzzle and making phone calls) x 15 minutes with 90% accuracy on all three tasks, to stimulate return to higher level work, cooking/home care, ability to grocery shop   Target Date 10/23/21   Date Met  10/20/21   Progress (detail required for progress note):  Goal met.  Pt demonstrating ability for multi tasking in the clinic, though she is still challenged at times     Goal Identifier Toleration for work tasks   Goal Description Patient will demonstrate WFLs visual scanning (organized scanning pattern) and visual reaction time skills using compensatory strategies prn, for safe independent community mobility and increased ability tolerate work tasks by scoring WNLs on all modes of the Dynavison and pen/paper scanning tasks.   Target Date 10/23/21   Date Met  10/20/21   Progress (detail required for progress note):  Goal Met     Goal Identifier Handwriting AE or techniques   Goal Description Pt  will demonstrate at least three handwriting techniques or use of pieces of adaptive equipment for ease of writing to improve handwriting performance.    Target Date 10/23/21   Date Met  10/20/21   Progress (detail required for progress note):  Goal Met.  Pt using AE triangular        Plan:  Discharge from therapy. See notes above and objective measures for progress Pt seen for full treatment plan, was compliant with home programming and met all goals.    Discharge:    Reason for Discharge: Patient has met all goals.    Equipment Issued:     Discharge Plan: Patient to continue home program as needed.

## 2021-10-28 ENCOUNTER — VIRTUAL VISIT (OUTPATIENT)
Dept: NEUROLOGY | Facility: CLINIC | Age: 46
End: 2021-10-28
Payer: COMMERCIAL

## 2021-10-28 DIAGNOSIS — U07.1 2019 NOVEL CORONAVIRUS DISEASE (COVID-19): Primary | ICD-10-CM

## 2021-10-28 DIAGNOSIS — G93.31 POSTVIRAL FATIGUE SYNDROME: ICD-10-CM

## 2021-10-28 PROCEDURE — 99213 OFFICE O/P EST LOW 20 MIN: CPT | Mod: GT | Performed by: INTERNAL MEDICINE

## 2021-10-28 RX ORDER — PERPHENAZINE 16 MG
600 TABLET ORAL
COMMUNITY

## 2021-10-28 RX ORDER — CALCIUM CARBONATE/VITAMIN D3 500-10/5ML
1 LIQUID (ML) ORAL DAILY
COMMUNITY

## 2021-10-28 NOTE — LETTER
10/28/2021         RE: Kathi Linn  [address] [facility] [address]        Dear Colleague,    Thank you for referring your patient, Kathi Linn, to the [facility]UR[facility] [place]. Please see a copy of my visit note below.    [facility] Neurology Follow Up Visit    Kathi Linn MRN# [id number]   Age: 46 year old YOB: 1975     Brief history of symptoms: The patient was initially seen in neurologic consultation on 9/2/2021 for evaluation of post-COVID symptoms. Please see the comprehensive neurologic consultation note from that date in the Epic records for details.     Interval history:  Overall things are a little better. Symptoms fluctuate, but on average she is about 75-80% improved from a fatigue and cognitive standpoint.    Cognitive rest has helped. She has also made diet changes which have been helpful. She has limited sugars. Mediation and exercise have also been helpful.     She went to work full time the first week in October. She was a little fatigued the first week. 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    She is better in the morning.     She completed OT earlier this month. She noticed when she played Tetris, it helped turn on the brain.      She is still in PT for a few more weeks. This has been helpful in increasing her cardio.       Past Medical History:     Patient Active Problem List   Diagnosis   (none) - all problems resolved or deleted     Past Medical History:   Diagnosis Date     Occipital neuralgia      Uncomplicated asthma 9/19/2006        Past Surgical History:     Past Surgical History:   Procedure Laterality Date     ABDOMEN SURGERY      DUODENAL SWITCH  2007     C/SECTION, CLASSICAL  1999, 2002     CHOLECYSTECTOMY       HERNIORRHAPHY VENTRAL  10/2/2013    Procedure: HERNIORRHAPHY VENTRAL;  Open Ventral hernia Repair with mesh;  Surgeon: Orly Dunlap MD;  Location:  OR        Social History:      Social History     Tobacco Use     Smoking status: Former Smoker     Packs/day: 0.10     Years: 2.00     Pack years: 0.20     Types: Cigarettes, Clove cigarettes or kreteks     Quit date: 1998     Years since quittin.8     Smokeless tobacco: Never Used     Tobacco comment: QUIT in    Vaping Use     Vaping Use: Never used   Substance Use Topics     Alcohol use: Yes     Alcohol/week: 0.0 standard drinks     Comment: 3 DRINKS PER WEEK     Drug use: No        Family History:     Family History   Problem Relation Age of Onset     Lymphoma Father      Other Cancer Father          1997     Obesity Father      Hypertension Mother      Cerebrovascular Disease Mother      Depression Mother      Osteoporosis Mother      Depression Sister      Cerebrovascular Disease Paternal Aunt      Cerebrovascular Disease Maternal Grandmother      Melanoma Maternal Grandmother      Depression Maternal Grandmother      Lung Cancer Paternal Grandmother      Lung Cancer Maternal Grandfather      Breast Cancer Paternal Aunt         post-menopausal     Hypertension Sister      Depression Sister         Medications:     Current Outpatient Medications   Medication Sig     albuterol (PROAIR HFA/PROVENTIL HFA/VENTOLIN HFA) 108 (90 BASE) MCG/ACT Inhaler Inhale 1-2 puffs into the lungs every 4 hours as needed for shortness of breath / dyspnea or wheezing     augmented betamethasone dipropionate (DIPROLENE) 0.05 % external lotion Apply to scalp once per day for 7 days and then when needed     BIOTIN 1 tablet daily      CALCIUM 500 MG OR TABS      cholecalciferol (VITAMIN D3) 5000 UNITS CAPS capsule Take 5,000 Units by mouth daily     ferrous gluconate (FERGON) 324 (38 FE) MG tablet Take 1 tablet (324 mg) by mouth 2 times daily     fluocinolone (SYNALAR) 0.01 % solution Apply to scalp once per day for 7 days then when needed     ibuprofen (ADVIL,MOTRIN) 200 MG tablet Take 1-2 tablets (200-400 mg) by mouth every 6 hours as  needed for other (mild pain)     Magnesium Hydroxide (MAGNESIA PO) Take 1 tablet by mouth daily      MULTIVITAMIN TABS   OR      naproxen sodium (ANAPROX) 550 MG tablet      triamcinolone (KENALOG) 0.1 % external cream Apply topically 2 times daily Apply to AA on neck, hands bid for 10-14 days then when needed     VITAMIN A EX 5000 1 tab qd      VITAMIN E 1 tablet daily.     VITAMIN K PO Take 1 tablet by mouth daily.     Zinc Acetate, Oral, (ZINC ACETATE PO)      No current facility-administered medications for this visit.        Allergies:     Allergies   Allergen Reactions     Corticosteroids      flovent, pulmacort rash on face        Review of Systems:   As above     Physical Exam:   Vitals: There were no vitals taken for this visit.   No examination given nature of virtual visit     Data reviewed on previous visits    Imaging:  CT chest 4/2021  IMPRESSION:  1.  No evidence for pulmonary embolism.   2.  Moderate pneumonia, appearance compatible with COVID 19 pneumonia.     Procedures:  EKG sinus rhythm 4/2021    Laboratory:  TSH normal 12/2020  CBC/CMP unremarkable 4/2021    Pertinent Investigations since last visit:   9/2021 - CBC, CMP, B12, TSH with Na of 132, otherwise normal/unremarkable         Assessment and Plan:   Kathi Linn is a 46 year old female who presents today for follow-up of post COVID symptoms. Patient had COVID in 4/2021. She had lingering fatigue and cognitive complaints after COVID, which have slowly improved. She is currently about 75-80% improved. Symptoms are compatible with improving post COVID syndrome. She is back working full time. She is continuing exercises/strategies learned in physical/occupational therapy and has made lifestyle changes to aid in recovery. I encouraged patient to reach back out if there worsening of symptoms in the future.    Follow up in Neurology clinic as needed should new concerns arise.    Jose Angel Baca MD   of Neurology  [facility]    The total time of this encounter today amounted to 26 minutes. This time included time spent with the patient, prep work, ordering tests, and performing post visit documentation.      Kathi is a 46 year old who is being evaluated via a billable video visit.      How would you like to obtain your AVS? MyChart  If the video visit is dropped, the invitation should be resent by: Text to cell phone: [phone]  Will anyone else be joining your video visit? No      Total video time: 20 minutes.       Again, thank you for allowing me to participate in the care of your patient.        Sincerely,        Jose Angel Baca MD

## 2021-10-28 NOTE — PROGRESS NOTES
Kathi is a 46 year old who is being evaluated via a billable video visit.      How would you like to obtain your AVS? [employer]  If the video visit is dropped, the invitation should be resent by: Text to cell phone: [phone]  Will anyone else be joining your video visit? No      Total video time: 20 minutes.

## 2021-10-28 NOTE — PROGRESS NOTES
[facility] Neurology Follow Up Visit    Kathi Linn MRN# [id number]   Age: 46 year old YOB: 1975     Brief history of symptoms: The patient was initially seen in neurologic consultation on 2021 for evaluation of post-COVID symptoms. Please see the comprehensive neurologic consultation note from that date in the Epic records for details.     Interval history:  Overall things are a little better. Symptoms fluctuate, but on average she is about 75-80% improved from a fatigue and cognitive standpoint.    Cognitive rest has helped. She has also made diet changes which have been helpful. She has limited sugars. Mediation and exercise have also been helpful.     She went to work full time the first week in October. She was a little fatigued the first week. 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    She is better in the morning.     She completed OT earlier this month. She noticed when she played Tetris, it helped turn on the brain.      She is still in PT for a few more weeks. This has been helpful in increasing her cardio.       Past Medical History:     Patient Active Problem List   Diagnosis   (none) - all problems resolved or deleted     Past Medical History:   Diagnosis Date     Occipital neuralgia      Uncomplicated asthma 2006        Past Surgical History:     Past Surgical History:   Procedure Laterality Date     ABDOMEN SURGERY      DUODENAL SWITCH       C/SECTION, CLASSICAL  ,      CHOLECYSTECTOMY       HERNIORRHAPHY VENTRAL  10/2/2013    Procedure: HERNIORRHAPHY VENTRAL;  Open Ventral hernia Repair with mesh;  Surgeon: Orly Dunlap MD;  Location:  OR        Social History:     Social History     Tobacco Use     Smoking status: Former Smoker     Packs/day: 0.10     Years: 2.00     Pack years: 0.20     Types: Cigarettes, Clove cigarettes or kreteks     Quit date: 1998     Years since quittin.8     Smokeless tobacco: Never Used      Tobacco comment: QUIT in    Vaping Use     Vaping Use: Never used   Substance Use Topics     Alcohol use: Yes     Alcohol/week: 0.0 standard drinks     Comment: 3 DRINKS PER WEEK     Drug use: No        Family History:     Family History   Problem Relation Age of Onset     Lymphoma Father      Other Cancer Father          1997     Obesity Father      Hypertension Mother      Cerebrovascular Disease Mother      Depression Mother      Osteoporosis Mother      Depression Sister      Cerebrovascular Disease Paternal Aunt      Cerebrovascular Disease Maternal Grandmother      Melanoma Maternal Grandmother      Depression Maternal Grandmother      Lung Cancer Paternal Grandmother      Lung Cancer Maternal Grandfather      Breast Cancer Paternal Aunt         post-menopausal     Hypertension Sister      Depression Sister         Medications:     Current Outpatient Medications   Medication Sig     albuterol (PROAIR HFA/PROVENTIL HFA/VENTOLIN HFA) 108 (90 BASE) MCG/ACT Inhaler Inhale 1-2 puffs into the lungs every 4 hours as needed for shortness of breath / dyspnea or wheezing     augmented betamethasone dipropionate (DIPROLENE) 0.05 % external lotion Apply to scalp once per day for 7 days and then when needed     BIOTIN 1 tablet daily      CALCIUM 500 MG OR TABS      cholecalciferol (VITAMIN D3) 5000 UNITS CAPS capsule Take 5,000 Units by mouth daily     ferrous gluconate (FERGON) 324 (38 FE) MG tablet Take 1 tablet (324 mg) by mouth 2 times daily     fluocinolone (SYNALAR) 0.01 % solution Apply to scalp once per day for 7 days then when needed     ibuprofen (ADVIL,MOTRIN) 200 MG tablet Take 1-2 tablets (200-400 mg) by mouth every 6 hours as needed for other (mild pain)     Magnesium Hydroxide (MAGNESIA PO) Take 1 tablet by mouth daily      MULTIVITAMIN TABS   OR      naproxen sodium (ANAPROX) 550 MG tablet      triamcinolone (KENALOG) 0.1 % external cream Apply topically 2 times daily Apply to AA on neck,  hands bid for 10-14 days then when needed     VITAMIN A EX 5000 1 tab qd      VITAMIN E 1 tablet daily.     VITAMIN K PO Take 1 tablet by mouth daily.     Zinc Acetate, Oral, (ZINC ACETATE PO)      No current facility-administered medications for this visit.        Allergies:     Allergies   Allergen Reactions     Corticosteroids      flovent, pulmacort rash on face        Review of Systems:   As above     Physical Exam:   Vitals: There were no vitals taken for this visit.   No examination given nature of virtual visit     Data reviewed on previous visits    Imaging:  CT chest 4/2021  IMPRESSION:  1.  No evidence for pulmonary embolism.   2.  Moderate pneumonia, appearance compatible with COVID 19 pneumonia.     Procedures:  EKG sinus rhythm 4/2021    Laboratory:  TSH normal 12/2020  CBC/CMP unremarkable 4/2021    Pertinent Investigations since last visit:   9/2021 - CBC, CMP, B12, TSH with Na of 132, otherwise normal/unremarkable         Assessment and Plan:   Kathi Linn is a 46 year old female who presents today for follow-up of post COVID symptoms. Patient had COVID in 4/2021. She had lingering fatigue and cognitive complaints after COVID, which have slowly improved. She is currently about 75-80% improved. Symptoms are compatible with improving post COVID syndrome. She is back working full time. She is continuing exercises/strategies learned in physical/occupational therapy and has made lifestyle changes to aid in recovery. I encouraged patient to reach back out if there worsening of symptoms in the future.    Follow up in Neurology clinic as needed should new concerns arise.    Jose Angel Baca MD   of Neurology  [facility]    The total time of this encounter today amounted to 26 minutes. This time included time spent with the patient, prep work, ordering tests, and performing post visit documentation.

## 2021-10-30 ENCOUNTER — HEALTH MAINTENANCE LETTER (OUTPATIENT)
Age: 46
End: 2021-10-30

## 2021-11-10 ENCOUNTER — HOSPITAL ENCOUNTER (OUTPATIENT)
Dept: PHYSICAL THERAPY | Facility: CLINIC | Age: 46
End: 2021-11-10
Payer: COMMERCIAL

## 2021-11-10 DIAGNOSIS — U07.1 2019 NOVEL CORONAVIRUS DISEASE (COVID-19): ICD-10-CM

## 2021-11-10 DIAGNOSIS — R53.1 DECREASED STRENGTH, ENDURANCE, AND MOBILITY: Primary | ICD-10-CM

## 2021-11-10 DIAGNOSIS — Z74.09 DECREASED STRENGTH, ENDURANCE, AND MOBILITY: Primary | ICD-10-CM

## 2021-11-10 DIAGNOSIS — R68.89 DECREASED STRENGTH, ENDURANCE, AND MOBILITY: Primary | ICD-10-CM

## 2021-11-10 DIAGNOSIS — G93.31 POSTVIRAL FATIGUE SYNDROME: ICD-10-CM

## 2021-11-10 PROCEDURE — 97110 THERAPEUTIC EXERCISES: CPT | Mod: GP | Performed by: PHYSICAL THERAPIST

## 2021-11-10 PROCEDURE — 97140 MANUAL THERAPY 1/> REGIONS: CPT | Mod: GP | Performed by: PHYSICAL THERAPIST

## 2021-11-17 ENCOUNTER — HOSPITAL ENCOUNTER (OUTPATIENT)
Dept: PHYSICAL THERAPY | Facility: CLINIC | Age: 46
End: 2021-11-17
Payer: COMMERCIAL

## 2021-11-17 DIAGNOSIS — G93.31 POSTVIRAL FATIGUE SYNDROME: ICD-10-CM

## 2021-11-17 DIAGNOSIS — U07.1 2019 NOVEL CORONAVIRUS DISEASE (COVID-19): ICD-10-CM

## 2021-11-17 DIAGNOSIS — Z74.09 DECREASED STRENGTH, ENDURANCE, AND MOBILITY: Primary | ICD-10-CM

## 2021-11-17 DIAGNOSIS — R68.89 DECREASED STRENGTH, ENDURANCE, AND MOBILITY: Primary | ICD-10-CM

## 2021-11-17 DIAGNOSIS — R53.1 DECREASED STRENGTH, ENDURANCE, AND MOBILITY: Primary | ICD-10-CM

## 2021-11-17 PROCEDURE — 97110 THERAPEUTIC EXERCISES: CPT | Mod: GP | Performed by: PHYSICAL THERAPIST

## 2021-11-17 PROCEDURE — 97750 PHYSICAL PERFORMANCE TEST: CPT | Mod: GP | Performed by: PHYSICAL THERAPIST

## 2021-11-17 NOTE — PROGRESS NOTES
"Outpatient Physical Therapy Discharge Note     Patient: Kathi Linn  : 1975    Beginning/End Dates of Reporting Period:  2021 to 2021    Referring Provider: Jose Angel Baca MD    Therapy Diagnosis: Fatigue, decreased strength and balance, endurance, impaired flexibility, pain     Client Self Report: Pt reports she had a \"bad flare up\" of sciatica after last session with strengthening exercises. Went for a walk as well at good pace yesterday, now feeling \"mostly recovered\".  Overall, was seen for 5 sessions centering on education regarding progression of activity/exercise at home with good effect, pt reporting less fatigue on FACIT scale and improved balance/pain levels. Limited currently by back pain/hip pain.     Goals:  Goal Identifier Home program   Goal Description Pt independent in home program  to facilitate continued gains in strength, flexilbity, endurance, balance outside of therapy   Target Date 21 (met today)   Date Met   2021        Goal Identifier FACIT   Goal Description Patient will report less fatigue while performing daily activites and mobilty as indicated by improe FACIT score of at least 20   Target Date 21 (met today)   Date Met   2021   Pt scored a 33/52 this date (up from  at evaluation)     Goal Identifier 6' walk   Goal Description   Patient will have improved tolerance for community ambulation as demonstrated by a 70m increase in distance on 6' walk test.   Target Date 21   Date Met      Progress:Pt did not make improvement in 6MWT d/t very high score at initial attempt, however did report minimal improvement in fatigue based on OMNI scale      Goal Identifier STS   Goal Description Patient will demonstrate increased tolerance and ease of transfers as well as improved B LE strength adn endurance by completing at least 15 STS in 30\" without UE support   Target Date 21   Date Met      Progress: Not assessed today d/t sciatica " "    Goal Identifier Balance/ SLS   Goal Description Patient will be able to maintain SLS x 30\" nearing normal limits and demonstrating improved postural stability and decreased risk of falls   Target Date 11/19/21 (Met, able to complete greater than 30 seconds each side )   Date Met               Plan:  Discharge from therapy.    Discharge:    Reason for Discharge: Patient has met all goals.    Equipment Issued: Home exercise program recommendations    Discharge Plan: Patient to continue home program. Will continue to slowly increase activity as fatigue and sciatica allow; if no significant improvement in hip/low back pain over the next few months, will seek out orthopedic PT referral at that time. Pt agrees with this plan.   "

## 2022-02-04 ENCOUNTER — VIRTUAL VISIT (OUTPATIENT)
Dept: INTERNAL MEDICINE | Facility: CLINIC | Age: 47
End: 2022-02-04
Payer: COMMERCIAL

## 2022-02-04 DIAGNOSIS — R53.83 DECREASED ENERGY: ICD-10-CM

## 2022-02-04 DIAGNOSIS — K30 INDIGESTION: ICD-10-CM

## 2022-02-04 DIAGNOSIS — K21.9 GASTROESOPHAGEAL REFLUX DISEASE WITHOUT ESOPHAGITIS: ICD-10-CM

## 2022-02-04 DIAGNOSIS — M25.50 PAIN IN JOINT, MULTIPLE SITES: ICD-10-CM

## 2022-02-04 DIAGNOSIS — R21 RASH: Primary | ICD-10-CM

## 2022-02-04 PROCEDURE — 99214 OFFICE O/P EST MOD 30 MIN: CPT | Mod: GT | Performed by: INTERNAL MEDICINE

## 2022-02-04 NOTE — PROGRESS NOTES
VIDEO VISIT                                                       ASSESSMENT/PLAN                                                      (R21) Rash  (primary encounter diagnosis)  (K30) Indigestion  (K21.9) Gastroesophageal reflux disease without esophagitis  (M25.50) Pain in joint, multiple sites  (R53.83) Decreased energy  Comment: etiology of symptoms unclear, but they certainly deserves further work-up.  Plan:    - referred to dermatology for further evaluation of rash(es) - patient to schedule.    - referred to allergy to evaluate for possible food allergies - patient to schedule.    - labs ordered to evaluate for metabolic, nutritional, inflammatory, and infectious pathology.    Total time of video call between patient and provider was 25 minutes (4:22-4:47pm). Provider location: office. Patient location: home. Platform: [employer].     Estrella Marino MD   [facility]  [address] [address]  T: [phone], F: [phone]    SERGIO Linn is a very pleasant 46 year old female who requested a video visit to discuss several concerns:    Patient was made aware that this visit will be billed the same as an in-person visit and has given verbal consent to proceed with this video visit.     Patient has had a rash between her eyebrows for some time now. Rash is pink and sometimes flaky. No associated pruritus or discomfort/pain. No new glasses, make-up, or topical treatments. No treatments tried to date. Also with some isolated bumps left chin - dissimilar to rash between her eyebrows (more similar to acne).    Patient also feels like something is going on in her body but cannot put her finger on it. She suffers from joint pain, various rashes, decreased energy level compared to a couple years ago, acid reflux, and indigestion.    PSH significant for duodenal switch surgery.    OBJECTIVE                                                        Vitals: No vitals were obtained today due to virtual visit.    General: appears healthy, alert and no distress  Psychiatric: mentation normal, affect normal/bright, judgement and insight intact, normal speech and appearance well-groomed    ---    (Note was completed, in part, with [employer] voice-recognition software. Documentation reviewed, but some grammatical, spelling, and word errors may remain.)

## 2022-02-09 ENCOUNTER — ANCILLARY PROCEDURE (OUTPATIENT)
Dept: MAMMOGRAPHY | Facility: CLINIC | Age: 47
End: 2022-02-09
Payer: COMMERCIAL

## 2022-02-09 DIAGNOSIS — Z12.31 VISIT FOR SCREENING MAMMOGRAM: ICD-10-CM

## 2022-02-09 PROCEDURE — 77067 SCR MAMMO BI INCL CAD: CPT | Mod: TC | Performed by: RADIOLOGY

## 2022-02-09 PROCEDURE — 77063 BREAST TOMOSYNTHESIS BI: CPT | Mod: TC | Performed by: RADIOLOGY

## 2022-02-16 ENCOUNTER — LAB (OUTPATIENT)
Dept: LAB | Facility: CLINIC | Age: 47
End: 2022-02-16
Payer: COMMERCIAL

## 2022-02-16 DIAGNOSIS — R21 RASH: ICD-10-CM

## 2022-02-16 DIAGNOSIS — M25.50 PAIN IN JOINT, MULTIPLE SITES: ICD-10-CM

## 2022-02-16 DIAGNOSIS — R53.83 DECREASED ENERGY: ICD-10-CM

## 2022-02-16 DIAGNOSIS — K30 INDIGESTION: ICD-10-CM

## 2022-02-16 DIAGNOSIS — K21.9 GASTROESOPHAGEAL REFLUX DISEASE WITHOUT ESOPHAGITIS: ICD-10-CM

## 2022-02-16 LAB
BASOPHILS # BLD AUTO: 0 10E3/UL (ref 0–0.2)
BASOPHILS NFR BLD AUTO: 0 %
CRP SERPL-MCNC: <2.9 MG/L (ref 0–8)
DEPRECATED CALCIDIOL+CALCIFEROL SERPL-MC: 76 UG/L (ref 20–75)
EOSINOPHIL # BLD AUTO: 0.2 10E3/UL (ref 0–0.7)
EOSINOPHIL NFR BLD AUTO: 3 %
ERYTHROCYTE [DISTWIDTH] IN BLOOD BY AUTOMATED COUNT: 12.3 % (ref 10–15)
ERYTHROCYTE [SEDIMENTATION RATE] IN BLOOD BY WESTERGREN METHOD: 10 MM/HR (ref 0–20)
HCT VFR BLD AUTO: 39.7 % (ref 35–47)
HGB BLD-MCNC: 12.6 G/DL (ref 11.7–15.7)
LYMPHOCYTES # BLD AUTO: 1.7 10E3/UL (ref 0.8–5.3)
LYMPHOCYTES NFR BLD AUTO: 25 %
MCH RBC QN AUTO: 29.7 PG (ref 26.5–33)
MCHC RBC AUTO-ENTMCNC: 31.7 G/DL (ref 31.5–36.5)
MCV RBC AUTO: 94 FL (ref 78–100)
MONOCYTES # BLD AUTO: 0.7 10E3/UL (ref 0–1.3)
MONOCYTES NFR BLD AUTO: 10 %
NEUTROPHILS # BLD AUTO: 4 10E3/UL (ref 1.6–8.3)
NEUTROPHILS NFR BLD AUTO: 61 %
PLATELET # BLD AUTO: 286 10E3/UL (ref 150–450)
RBC # BLD AUTO: 4.24 10E6/UL (ref 3.8–5.2)
VIT B12 SERPL-MCNC: 1036 PG/ML (ref 193–986)
WBC # BLD AUTO: 6.5 10E3/UL (ref 4–11)

## 2022-02-16 PROCEDURE — 82728 ASSAY OF FERRITIN: CPT

## 2022-02-16 PROCEDURE — 83550 IRON BINDING TEST: CPT

## 2022-02-16 PROCEDURE — 83735 ASSAY OF MAGNESIUM: CPT

## 2022-02-16 PROCEDURE — 84100 ASSAY OF PHOSPHORUS: CPT

## 2022-02-16 PROCEDURE — 86617 LYME DISEASE ANTIBODY: CPT | Mod: 90

## 2022-02-16 PROCEDURE — 86140 C-REACTIVE PROTEIN: CPT

## 2022-02-16 PROCEDURE — 80050 GENERAL HEALTH PANEL: CPT

## 2022-02-16 PROCEDURE — 85652 RBC SED RATE AUTOMATED: CPT

## 2022-02-16 PROCEDURE — 86618 LYME DISEASE ANTIBODY: CPT

## 2022-02-16 PROCEDURE — 82306 VITAMIN D 25 HYDROXY: CPT

## 2022-02-16 PROCEDURE — 99000 SPECIMEN HANDLING OFFICE-LAB: CPT

## 2022-02-16 PROCEDURE — 82607 VITAMIN B-12: CPT

## 2022-02-16 PROCEDURE — 36415 COLL VENOUS BLD VENIPUNCTURE: CPT

## 2022-02-17 LAB
ALBUMIN SERPL-MCNC: 3.3 G/DL (ref 3.4–5)
ALP SERPL-CCNC: 87 U/L (ref 40–150)
ALT SERPL W P-5'-P-CCNC: 34 U/L (ref 0–50)
ANION GAP SERPL CALCULATED.3IONS-SCNC: 4 MMOL/L (ref 3–14)
AST SERPL W P-5'-P-CCNC: 12 U/L (ref 0–45)
B BURGDOR IGG+IGM SER QL: 1.2
BILIRUB SERPL-MCNC: 1.3 MG/DL (ref 0.2–1.3)
BUN SERPL-MCNC: 10 MG/DL (ref 7–30)
CALCIUM SERPL-MCNC: 8.4 MG/DL (ref 8.5–10.1)
CHLORIDE BLD-SCNC: 109 MMOL/L (ref 94–109)
CO2 SERPL-SCNC: 24 MMOL/L (ref 20–32)
CREAT SERPL-MCNC: 0.82 MG/DL (ref 0.52–1.04)
FERRITIN SERPL-MCNC: 9 NG/ML (ref 8–252)
GFR SERPL CREATININE-BSD FRML MDRD: 88 ML/MIN/1.73M2
GLUCOSE BLD-MCNC: 79 MG/DL (ref 70–99)
IRON SATN MFR SERPL: 37 % (ref 15–46)
IRON SERPL-MCNC: 144 UG/DL (ref 35–180)
MAGNESIUM SERPL-MCNC: 2.1 MG/DL (ref 1.6–2.3)
PHOSPHATE SERPL-MCNC: 3.9 MG/DL (ref 2.5–4.5)
POTASSIUM BLD-SCNC: 4.7 MMOL/L (ref 3.4–5.3)
PROT SERPL-MCNC: 6.9 G/DL (ref 6.8–8.8)
SODIUM SERPL-SCNC: 137 MMOL/L (ref 133–144)
TIBC SERPL-MCNC: 394 UG/DL (ref 240–430)
TSH SERPL DL<=0.005 MIU/L-ACNC: 2.21 MU/L (ref 0.4–4)

## 2022-02-18 ENCOUNTER — TRANSFERRED RECORDS (OUTPATIENT)
Dept: HEALTH INFORMATION MANAGEMENT | Facility: CLINIC | Age: 47
End: 2022-02-18
Payer: COMMERCIAL

## 2022-02-18 LAB
B BURGDOR IGG SER QL IB: NEGATIVE
B BURGDOR IGM SER QL IB: NEGATIVE

## 2022-02-19 ENCOUNTER — HEALTH MAINTENANCE LETTER (OUTPATIENT)
Age: 47
End: 2022-02-19

## 2022-03-08 ENCOUNTER — NURSE TRIAGE (OUTPATIENT)
Dept: INTERNAL MEDICINE | Facility: CLINIC | Age: 47
End: 2022-03-08
Payer: COMMERCIAL

## 2022-03-08 ENCOUNTER — OFFICE VISIT (OUTPATIENT)
Dept: URGENT CARE | Facility: URGENT CARE | Age: 47
End: 2022-03-08
Payer: COMMERCIAL

## 2022-03-08 ENCOUNTER — ANCILLARY PROCEDURE (OUTPATIENT)
Dept: GENERAL RADIOLOGY | Facility: CLINIC | Age: 47
End: 2022-03-08
Attending: PHYSICIAN ASSISTANT
Payer: COMMERCIAL

## 2022-03-08 VITALS
OXYGEN SATURATION: 99 % | SYSTOLIC BLOOD PRESSURE: 126 MMHG | TEMPERATURE: 98.1 F | BODY MASS INDEX: 27.17 KG/M2 | WEIGHT: 184 LBS | HEART RATE: 65 BPM | DIASTOLIC BLOOD PRESSURE: 72 MMHG

## 2022-03-08 DIAGNOSIS — M79.675 PAIN OF TOE OF LEFT FOOT: ICD-10-CM

## 2022-03-08 DIAGNOSIS — S92.515A CLOSED NONDISPLACED FRACTURE OF PROXIMAL PHALANX OF LESSER TOE OF LEFT FOOT, INITIAL ENCOUNTER: Primary | ICD-10-CM

## 2022-03-08 PROCEDURE — 73660 X-RAY EXAM OF TOE(S): CPT | Mod: LT | Performed by: RADIOLOGY

## 2022-03-08 PROCEDURE — 99213 OFFICE O/P EST LOW 20 MIN: CPT | Performed by: PHYSICIAN ASSISTANT

## 2022-03-08 NOTE — TELEPHONE ENCOUNTER
Nurse Triage SBAR    Is this a 2nd Level Triage? YES, LICENSED PRACTITIONER REVIEW IS REQUIRED    Situation : Pt ran into dresser this morning and hit her left toe/foot. She is able to walk on it but there is 5/10 pain with this. She notes the pinky toe is bruised and there is swelling. The pain radiates up the foot toward her ankle.     Background : Just occurred this morning about 7:30AM. At the time of injury she heard a snap sound.     Assessment : see below    (See information below for more triage details.)    Recommendation : Routing to provider for recommendation on protocol states be seen today. No appointments left today can this wait for a same day slot this week or does pt need to go to  today?    Protocol Recommended Disposition: Immediate office evaluation    OK to leave detailed vm     Reason for Disposition    A 'snap' or 'pop' was heard at the time of injury    Additional Information    Negative: Major bleeding (actively dripping or spurting) that can't be stopped    Negative: Amputation or bone sticking through the skin    Negative: Looks like a dislocated joint (crooked or deformed)    Negative: Serious injury with multiple fractures (broken bones)    Negative: Sounds like a life-threatening emergency to the triager    Negative: Wound looks infected    Negative: Caused by an animal bite    Negative: Puncture wound of foot    Negative: Toe injury is the main symptom    Negative: Cast problems or questions    Negative: Bullet, stabbed by knife or other serious penetrating wound    Negative: Can't stand (bear weight) or walk (e.g., 4 steps)    Negative: Skin is split open or gaping (length > 1/2 inch or 12 mm)    Negative: Bleeding won't stop after 10 minutes of direct pressure (using correct technique)    Negative: Dirt in the wound and not removed after 15 minutes of scrubbing    Negative: Numbness (new loss of sensation) of toe(s)    Negative: Looks infected (e.g., spreading redness, pus, red  "streak)    Negative: Sounds like a serious injury to the triager    Answer Assessment - Initial Assessment Questions  1. MECHANISM: \"How did the injury happen?\" (e.g., twisting injury, direct blow)         Pt running out of room and hit her dresser.     2. ONSET: \"When did the injury happen?\" (Minutes or hours ago)         This morning about 7:30AM.     3. LOCATION: \"Where is the injury located?\"         Left foot mainly on the top of the foot toward the outer part of the foot more so toward the top of the foot toward the toes.     4. APPEARANCE of INJURY: \"What does the injury look like?\"         Toe is slightly bruised, swelling also noted from pinky toe up a little bit of her foot.     5. WEIGHT-BEARING: \"Can you put weight on that foot?\" \"Can you walk (four steps or more)?\"          Can put weight on, but not without pain.     6. SIZE: For cuts, bruises, or swelling, ask: \"How large is it?\" (e.g., inches or centimeters;  entire joint)         All of the pinky toe.     7. PAIN: \"Is there pain?\" If so, ask: \"How bad is the pain?\"    (e.g., Scale 1-10; or mild, moderate, severe)        3/10 when not doing anything, ache. When standing/walking 5/10. Radiates up the foot toward the ankle.     8. TETANUS: For any breaks in the skin, ask: \"When was the last tetanus booster?\"        No breaks in skin.     9. OTHER SYMPTOMS: \"Do you have any other symptoms?\"         None.     10. PREGNANCY: \"Is there any chance you are pregnant?\" \"When was your last menstrual period?\"          Ablation done in Sept, IUD placed. Not pregnant at this time.    Protocols used: ANKLE AND FOOT INJURY-A-OH    SEE TODAY IN OFFICE:   * You need to be examined today. Let me give you an appointment.  * IF NO AVAILABLE APPOINTMENTS: You need to be seen in the Urgent Care Center. Go there today. A nearby Urgent Care Center is often a good source of care. Another choice is to go to the ER.      CALL BACK IF:  * Pain becomes severe  * Pain does not " improve after 3 days  * Pain or swelling lasts more than 2 weeks  * You become worse        Patient/Caregiver understands and will follow care advice? Other, see documentation     Ericka MADSEN RN  [facility]

## 2022-03-08 NOTE — TELEPHONE ENCOUNTER
Please clarify - did she hit her toe or her foot on the dresser? Or is she not sure.    If it was just her pinky toe, she likely broke it and there is not much to do about it. Can buddy tape it to 4th toe and use cool compresses and ibuprofen as needed for pain relief.    If she hit her foot she should be seen in UC for further evaluation of her foot (as those fractures may need intervention).

## 2022-03-08 NOTE — TELEPHONE ENCOUNTER
Palliative Medicine Consult  [place]: [phone] ([phone])    Patient Name: Rachel Chung  YOB: 1979    Date of Initial Consult: 6/27/17  Reason for Consult: overwhelming symptoms  Requesting Provider: Adam Raymond NP  Primary Care Physician: Milady Ulrich MD      SUMMARY:   Rachel Chung is a 42 y.o. with a past history of Crohn's disease,anxiety,  chronic pain, hx DVT, multiple (4) small bowel resections, s/p recent urgent dx lap, with lysis of adhesions, repair of suspected perf 6/7/17 , who was admitted on 6/25/2017 from home with a diagnosis of worsening abdominal pain, elevated WBC and MRSA wound infection. Initially consulted for pain management thought to be possible Crohn's flare. Initial CT on 6/25 w/out acute findings but repeat on 6/28 showing ischemic bowl. S/p ex lap but ischemic bowl unresectable, s/p SMA stent. Extubated on 6/30. Most recently s/p ex lap, KATHRINE, feeding tube, fistula exclusion and wound vac placement for EC fistula. Patient is followed chronically for pain management by Dr. Marquis Soliman at [address] [place] [address]. Home regimen for chronic abdominal pain is MS Contin 60 Q8 and MS IR 30 Q6 PRN. She also takes Xanax 1mg TID prn anxiety.  reviewed, opioids last prescribed on 5/19/17. PALLIATIVE DIAGNOSES:     1. Abd pain, acute on chronic   2. Anxiety, acute on chronic   3. Nausea  4. Crohn's disease  5. Chronic constipation (at home on [employer])  6. MRSA wound        PLAN:   1. Patient appears comfortable. Reports pain being well controlled. No new complaints. She has started eating ice chips, scared to try anything more. 2. Recommend continuing Morphine PCA 4mg/h basal, 5mg every 10 min demand. Seems to control the pain to some degree and w/out confusion or drowsiness (this has been a problem before). 3. Cont prn Dilaudid 1mg IV every 1h prn pain that is not controlled by PCA- hortencia before any dressing changes.  - required 4 doses yesterday and Spoke to patient with providers message.     Patient hit toe head-on and heard a snap. Pain currently travels up side of foot and there's a particular spot that seems very tender to the touch.     Advised patient to be seen in UC. Patient agrees and is heading to UC now.    5 today due to wound vac change  4. Pt feels that Morphine works better than Dilaudid (at equivalent doses). 5. Bowel regimen per Surgery team- has chronic constipation now on PCA. 6. Psychosocial support- pt suffering from loss of function and identity. Feels people are judging her for her pain medication requirement. Discussed with team to address concerns about opioids. 7. On Ativan 1 mg TID for anxiety  8. Communicated plan of care with: Palliative IDT; have been in communication w/ Dr aHlima Post. GOALS OF CARE / TREATMENT PREFERENCES:   [====Goals of Care====]  GOALS OF CARE:  Patient / health care proxy stated goals: pain control  Recovery from acute issues      TREATMENT PREFERENCES:   Code Status: Full Code    Advance Care Planning:  Advance Care Planning 6/26/2017   Patient's Healthcare Decision Maker is: Legal Next of Camilo 67   Primary Decision Maker Name Italia Bailey   Primary Decision Maker Phone Number [phone]   Primary Decision Maker Relationship to Patient Parent   Confirm Advance Directive None   Patient Would Like to Complete Advance Directive No       Other:    The palliative care team has discussed with patient / health care proxy about goals of care / treatment preferences for patient.  [====Goals of Care====]         HISTORY:         HPI/SUBJECTIVE:    The patient is:   [x] Verbal and participatory  [] Non-participatory due to: Medical condition     24 hours use of meds- 5 doses of Dilaudid 1 mg IV so far- complicated wound vac change this morning. pca- 3 boluses in 2 hours prior to wound vac change  Started eating ice chips.     Clinical Pain Assessment (nonverbal scale for severity on nonverbal patients):   [++++ Clinical Pain Assessment++++]  [++++Pain Severity++++]: Pain: 7  [++++Pain Character++++]: sharp and burning, stabbing  [++++Pain Duration++++]: several days  [++++Pain Effect++++]: hard to walk, feeling anxious  [++++Pain Factors++++]: better after pain medicaion  [++++Pain Frequency++++]: constant  [++++Pain Location++++]: across abdomen both sides in middle at incision site  [++++ Clinical Pain Assessment++++]     FUNCTIONAL ASSESSMENT:     Palliative Performance Scale (PPS):  PPS: 50       PSYCHOSOCIAL/SPIRITUAL SCREENING:     Advance Care Planning:  Advance Care Planning 6/26/2017   Patient's Healthcare Decision Maker is: Legal Next of Camilo Jiménez   Primary Decision Maker Name Haley Rodriguez   Primary Decision Maker Phone Number [phone]   Primary Decision Maker Relationship to Patient Parent   Confirm Advance Directive None   Patient Would Like to Complete Advance Directive No        Any spiritual / Anabaptist concerns:  [] Yes /  [x] No    Caregiver Burnout:  [] Yes /  [x] No /  [] No Caregiver Present      Anticipatory grief assessment:   [x] Normal  / [] Maladaptive       ESAS Anxiety: Anxiety: 3    ESAS Depression: Depression: 2        REVIEW OF SYSTEMS:     Positive and pertinent negative findings in ROS are noted above in HPI. The following systems were [x] reviewed / [] unable to be reviewed as noted in HPI  Other findings are noted below. Systems: constitutional, ears/nose/mouth/throat, respiratory, gastrointestinal, genitourinary, musculoskeletal, integumentary, neurologic, psychiatric, endocrine. Positive findings noted below. Modified ESAS Completed by: provider   Fatigue: 4 Drowsiness: 0   Depression: 2 Pain: 7   Anxiety: 3 Nausea: 0   Anorexia: 0 Dyspnea: 0     Constipation: No              PHYSICAL EXAM:     From RN flowsheet:  Wt Readings from Last 3 Encounters:   07/13/17 339 lb 15.2 oz (154.2 kg)   06/25/17 343 lb (155.6 kg)   06/22/17 343 lb (155.6 kg)     Blood pressure 112/50, pulse (!) 103, temperature 98.1 °F (36.7 °C), resp. rate 18, height 5' 4\" (1.626 m), weight 339 lb 15.2 oz (154.2 kg), last menstrual period 05/21/2017, SpO2 99 %, not currently breastfeeding.     Pain Scale 1: Numeric (0 - 10)  Pain Intensity 1: 0  Pain Onset 1: post op  Pain Location 1: Abdomen  Pain Orientation 1: Anterior  Pain Description 1: Aching  Pain Intervention(s) 1: Medication (see MAR)  Last bowel movement, if known: stool in ostomy     Constitutional:awake, alert, oriented, tearful and axious   Eyes: pupils equal, anicteric  ENMT: no nasal discharge, moist mucous membranes  Cardiac: regular rhythm   Respiratory: breathing not labored  Gastrointestinal: midline incision w/ wound vac, b/l wounds. Hypoactive bowel sounds   Musculoskeletal: no deformity, no tenderness to palpation  Skin: warm, dry  No edema  Neurologic: moving all extremities  Psych: anxious      HISTORY:     Active Problems:    Abdominal pain (6/25/2017)      Small bowel ischemia ([address] (6/28/2017)      Overview: CT abd/pelvis 6/28/17      1. The stomach and proximal small bowel loops are distended. There is a       loop of      small bowel in the midabdomen which is mildly dilated and does not       demonstrate      enhancement in the wall and there is suspicion of pneumatosis and this       leads to      a loop of small bowel which demonstrates thickening of the wall and       findings are      suspicious for ischemia. 2. There is extensive mesenteric edema and a small amount of ascites in       the      pelvis. 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 There is nonocclusive thrombus in the SMA.             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 Joi Corea      Family History   Problem Relation Age of Onset  Hypertension Father     Stroke Father     Other Father      arthritis      History reviewed, no pertinent family history.   Social History   Substance Use Topics    Smoking status: Former Smoker    Smokeless tobacco: Not on file    Alcohol use No     Allergies   Allergen Reactions    Demerol [Meperidine] Unknown (comments)    Soma [Carisoprodol] Rash and Nausea Only    Sulfa (Sulfonamide Antibiotics) Unknown (comments)    Toradol [Ketorolac Tromethamine] Unknown (comments)      Current Facility-Administered Medications   Medication Dose Route Frequency    vancomycin (VANCOCIN) 1250 mg in  ml infusion  1,250 mg IntraVENous Q24H    clopidogrel (PLAVIX) tablet 75 mg  75 mg Oral DAILY    alteplase (CATHFLO) 1 mg in sterile water (preservative free) 1 mL injection  1 mg InterCATHeter PRN    sodium chloride (NS) flush 10 mL  10 mL InterCATHeter Q24H    sodium chloride (NS) flush 10 mL  10 mL InterCATHeter PRN    sodium chloride (NS) flush 10-40 mL  10-40 mL InterCATHeter Q8H    alteplase (CATHFLO) 1 mg in sterile water (preservative free) 1 mL injection  1 mg InterCATHeter PRN    bacitracin 500 unit/gram packet 1 Packet  1 Packet Topical PRN    0.9% sodium chloride infusion 250 mL  250 mL IntraVENous PRN    lactated Ringers infusion  50 mL/hr IntraVENous CONTINUOUS    fat emulsion 20% (LIPOSYN, INTRALIPID) infusion 500 mL  500 mL IntraVENous Q MON, WED & FRI    LORazepam (ATIVAN) injection 1 mg  1 mg IntraVENous Q8H    HYDROmorphone (PF) (DILAUDID) injection 1 mg  1 mg IntraVENous Q1H PRN    aspirin chewable tablet 81 mg  81 mg Oral DAILY    octreotide (SANDOSTATIN) injection 50 mcg  50 mcg IntraVENous TID    insulin regular (NOVOLIN R, HUMULIN R) injection   SubCUTAneous Q6H    morphine (PF)  mg/30 ml   IntraVENous CONTINUOUS    glucose chewable tablet 16 g  4 Tab Oral PRN    glucagon (GLUCAGEN) injection 1 mg  1 mg IntraMUSCular PRN    dextrose 10 % infusion 125-250 mL 125-250 mL IntraVENous PRN    pantoprazole (PROTONIX) 40 mg in sodium chloride 0.9 % 10 mL injection  40 mg IntraVENous DAILY    albuterol-ipratropium (DUO-NEB) 2.5 MG-0.5 MG/3 ML  3 mL Nebulization Q6H PRN    prochlorperazine (COMPAZINE) with saline injection 5 mg  5 mg IntraVENous Q6H PRN    anidulafungin (ERAXIS) 100 mg in 0.9% sodium chloride 130 mL IVPB  100 mg IntraVENous Q24H    ondansetron (ZOFRAN) injection 8 mg  8 mg IntraVENous Q6H PRN    sodium chloride (NS) flush 5-10 mL  5-10 mL IntraVENous Q8H    sodium chloride (NS) flush 5-10 mL  5-10 mL IntraVENous PRN    naloxone (NARCAN) injection 0.4 mg  0.4 mg IntraVENous PRN    diphenhydrAMINE (BENADRYL) injection 12.5 mg  12.5 mg IntraVENous Q4H PRN    enoxaparin (LOVENOX) injection 40 mg  40 mg SubCUTAneous Q24H    piperacillin-tazobactam (ZOSYN) 3.375 g in 0.9% sodium chloride (MBP/ADV) 100 mL  3.375 g IntraVENous Q8H    Vancomycin ---Pharmacy to Dose   Other Rx Dosing/Monitoring          LAB AND IMAGING FINDINGS:     Lab Results   Component Value Date/Time    WBC 17.5 07/13/2017 04:45 AM    HGB 9.1 07/13/2017 04:45 AM    PLATELET 479 60/92/0154 04:45 AM     Lab Results   Component Value Date/Time    Sodium 140 07/13/2017 04:45 AM    Potassium 4.3 07/13/2017 04:45 AM    Chloride 107 07/13/2017 04:45 AM    CO2 24 07/13/2017 04:45 AM    BUN 26 07/13/2017 04:45 AM    Creatinine 1.31 07/13/2017 04:45 AM    Calcium 8.9 07/13/2017 04:45 AM    Magnesium 1.8 07/13/2017 04:45 AM    Phosphorus 4.0 07/13/2017 04:45 AM      Lab Results   Component Value Date/Time    AST (SGOT) 11 07/08/2017 04:42 AM    Alk.  phosphatase 120 07/08/2017 04:42 AM    Protein, total 7.0 07/08/2017 04:42 AM    Albumin 1.5 07/08/2017 04:42 AM    Globulin 5.5 07/08/2017 04:42 AM     No results found for: INR, PTMR, PTP, PT1, PT2, APTT   No results found for: IRON, FE, TIBC, IBCT, PSAT, FERR   No results found for: PH, PCO2, PO2  No components found for: GLPOC   No results found for: CPK, CKMB             Total time:    Counseling / coordination time, spent as noted above:    > 50% counseling / coordination?:     Prolonged service was provided for  []30 min   []75 min in face to face time in the presence of the patient, spent as noted above. Time Start:   Time End:   Note: this can only be billed with 39267 (initial) or 26959 (follow up). If multiple start / stop times, list each separately.

## 2022-03-08 NOTE — PATIENT INSTRUCTIONS
Patient Education     Closed Toe Fracture  Your toe is broken (fractured). This causes pain, swelling, and sometimes bruising. This injury usually takes about 4 to 6 weeks to heal, but can sometimes take longer. Toe injuries are often treated by taping the injured toe to the next one (buddy taping). Or you may have a hard shoe, splint, or cast. These protect the injured toe and hold it in position.   If the toenail has been severely injured, it may fall off in 1 to 2 weeks. It takes up to 12 months for a new toenail to grow back.   Home care  Follow these guidelines when caring for yourself at home:    You may be given a cast shoe to wear to keep your toe from moving. If not, you can use a sandal or any shoe that doesn t put pressure on the injured toe until the swelling and pain go away. If using a sandal, be careful not to strike your foot against anything. Another injury could make the fracture worse. If you were given crutches, don t put full weight on the injured foot until you can do so without pain, or as directed by your healthcare provider.    Keep your foot elevated to reduce pain and swelling. When sleeping, put a pillow under the injured leg. When sitting, support the injured leg so it is above your waist. This is very important during the first 2 days (48 hours).    Put an ice pack on the injured area. Do this for 20 minutes every 1 to 2 hours the first day for pain relief. You can make an ice pack by wrapping a plastic bag of ice cubes in a thin towel. As the ice melts, be careful that any cloth or paper tape doesn t get wet. Continue using the ice pack 3 to 4 times a day for the next 2 days. Then use the ice pack as needed to ease pain and swelling.    If buddy tape was used and it becomes wet or dirty, change it. You may replace it with paper, plastic, or cloth tape. Cloth tape and paper tapes must be kept dry.    You may use acetaminophen or ibuprofen to control pain, unless another pain medicine  was prescribed. If you have chronic liver or kidney disease, talk with your healthcare provider before using these medicines. Also talk with your provider if you ve had a stomach ulcer or gastrointestinal bleeding.    You may return to sports or physical education activities after 4 weeks when you can run without pain, or as directed by your healthcare provider.  Follow-up care  Follow up with your healthcare provider or as advised. This is to make sure the bone is healing the way it should.   X-rays may be taken. You will be told of any new findings that may affect your care.  When to seek medical advice  Call your healthcare provider right away if any of these occur:    Pain or swelling gets worse    The cast/splint cracks    The cast and padding get wet and stays wet more than 24 hours    Bad odor from the cast/splint or wound fluid stains the cast    Tightness or pressure under the cast/splint gets worse    Toe becomes cold, blue, numb, or tingly    You can t move the toe    Signs of infection: fever, redness, warmth, swelling, or drainage from the wound or cast    Fever of 100.4 F (38 C) or higher, or as directed by your healthcare provider    Eduin Kirk last reviewed this educational content on 2/1/2020 2000-2021 [employer]. All rights reserved. This information is not intended as a substitute for professional medical care. Always follow your healthcare professional's instructions.

## 2022-03-08 NOTE — PROGRESS NOTES
"ASSESSMENT/PLAN:      (S92.515A) Closed nondisplaced fracture of proximal phalanx of lesser toe of left foot, initial encounter  (primary encounter diagnosis)  MDM: Please see above x-ray discussion.   Plan: Ankle/Foot Bracing Supplies Order for DME -         ONLY FOR DME  -Buddy tape  -Hard soled Post-Op shoe   -As needed Tylenol or Ibuprofen for pain   -Ice and elevate   -If not improving in 1 week, follow-up with podiatrist/foot doctor or primary care provider. Follow-up sooner if any sudden change or worsening     (M79.675) Pain of toe of left foot  Plan: XR Toe Left G/E 2 Views, Ankle/Foot Bracing         Supplies Order for DME - ONLY FOR DME    -------------------      Kathi Linn is a very pleasant 46 year old female who presents to urgent care today for evaluation of possible fracture of left 5th toe.     HPI: Patient describes accidental jamming mechanism of injury (jammed toe into base of furniture at home). Patient heard a \"snap\" and reports increased pain with weight bearing since injury occurred earlier today. She did not note a distinct dislocation/did not have to manually relocate toe after injury.     Past Medical History:   Diagnosis Date     Occipital neuralgia      Uncomplicated asthma 9/19/2006     Patient Active Problem List   Diagnosis   (none) - all problems resolved or deleted     Current Outpatient Medications   Medication     alpha-lipoic acid 600 MG capsule     BIOTIN     CALCIUM 500 MG OR TABS     cholecalciferol (VITAMIN D3) 5000 UNITS CAPS capsule     Copper Gluconate (COPPER CAPS) 2 MG CAPS     ferrous gluconate (FERGON) 324 (38 FE) MG tablet     GLUTAMINE PO     Magnesium Hydroxide (MAGNESIA PO)     MULTIVITAMIN TABS   OR     VITAMIN A EX     VITAMIN E     VITAMIN K PO     Zinc Acetate, Oral, (ZINC ACETATE PO)     No current facility-administered medications for this visit.     OBJECTIVE:   /72 (BP Location: Right arm, Patient Position: Sitting, Cuff Size: Adult Regular)   " Pulse 65   Temp 98.1  F (36.7  C)   Wt 83.5 kg (184 lb)   SpO2 99%   Breastfeeding No   BMI 27.17 kg/m      GENERAL:  NAD  LEFT FOOT: Diffuse swelling 5th digit on  inspection.  No obvious deformity.  No redness, swelling, heat or bruising.  Positive for tenderness 5th metatarsal head and 5th DIP joint area.   Non-tender over remainder of  MTP's or phalanxes. Non-tender over medial or lateral ankle. Non-tender over tibia or fibula. Antalgic gait is noted.     I reviewed my impression (Positive for subtle lucency distal proximal 5th phalanx on one view noted today. No displaced fractures. No overt dislocations), along with actual x-ray images, with patient during her office visit today.      Of note: Radiologist over-read came through negative today, while patient was still in clinic so I shared that information with her.  Due to her mechanism of injury, site of pain on exam, and subtle lucency by my read, I still have suspicion for hairline fracture.

## 2022-03-17 ENCOUNTER — MYC MEDICAL ADVICE (OUTPATIENT)
Dept: INTERNAL MEDICINE | Facility: CLINIC | Age: 47
End: 2022-03-17
Payer: COMMERCIAL

## 2022-03-18 ENCOUNTER — OFFICE VISIT (OUTPATIENT)
Dept: PODIATRY | Facility: CLINIC | Age: 47
End: 2022-03-18
Payer: COMMERCIAL

## 2022-03-18 VITALS — WEIGHT: 182 LBS | BODY MASS INDEX: 26.88 KG/M2

## 2022-03-18 DIAGNOSIS — M79.672 LEFT FOOT PAIN: ICD-10-CM

## 2022-03-18 DIAGNOSIS — S99.922A INJURY OF LEFT FOOT, INITIAL ENCOUNTER: Primary | ICD-10-CM

## 2022-03-18 PROCEDURE — 99203 OFFICE O/P NEW LOW 30 MIN: CPT | Performed by: PODIATRIST

## 2022-03-18 NOTE — PATIENT INSTRUCTIONS
Thank you for choosing [facility]!    DR. CORRAL'S CLINIC LOCATIONS:     [facility] TRIAGE LINE: [phone] - [address]   [address] [place] [address] APPOINTMENTS: [phone]   [place] [address] RADIOLOGY: [phone]    SET UP SURGERY: [phone]    BILLING QUESTIONS: [phone]   [facility] FAX: [phone] [id number]    [place] [address]      Follow up: 2-4 weeks    Next steps: 2 weeks in the CAM walker with your orthotics in the boot

## 2022-03-18 NOTE — PROGRESS NOTES
ASSESSMENT:  Encounter Diagnoses   Name Primary?     Injury of left foot, initial encounter Yes     Left foot pain      MEDICAL DECISION MAKING:  I personally reviewed the x-ray images with Kathi.  I do not appreciate any fractures or dislocations.  Obviously she is having pain and tissues have been injured.  This might involve a bone contusion, stress reaction of bone, capsular/ligament injury and even articular surface injury.    This warrants rest.  She would like to do everything she can to resolve this and continue on with regular exercise.    She is agreeable to CAM Walker immobilization for 2 to 4 weeks.  I suggest she place an orthotic or arch support within the device.  We discussed the possibility of cast atrophy, other musculoskeletal pain due to ambulating in the boot and DVT.  She was instructed on ankle range of motion exercises.    Relative rest, ice, over-the-counter pain relievers were discussed.    Follow-up in 2 to 3 weeks.  We will re x-ray at that time.    Disclaimer: This note consists of symbols derived from keyboarding, dictation and/or voice recognition software. As a result, there may be errors in the script that have gone undetected. Please consider this when interpreting information found in this chart.    Jori Cadena DPM, FACFAS, MS    [place] [facility] & Ankle Surgery      ____________________________________________________________________    HPI:         Kathi presents today reporting injury to her left foot.  This occurred 8 days ago when she stubbed her fifth toe into a dresser.  She was evaluated in urgent care.  X-rays were negative for fracture.  She continues to have an aching pain rated a 4/10.  She denies limping but does have pain with walking.  She walks up to 3 miles for exercise.  She says she has a cold with long haul her with eventually like to get back to running.  This injury is a setback.    *  Past Medical History:   Diagnosis Date      Occipital neuralgia      Uncomplicated asthma 9/19/2006   *  *  Past Surgical History:   Procedure Laterality Date     ABDOMEN SURGERY      DUODENAL SWITCH  2007     C/SECTION, CLASSICAL  1999, 2002     CHOLECYSTECTOMY       HERNIORRHAPHY VENTRAL  10/2/2013    Procedure: HERNIORRHAPHY VENTRAL;  Open Ventral hernia Repair with mesh;  Surgeon: Orly Dunlap MD;  Location:  OR   *  *  Current Outpatient Medications   Medication Sig Dispense Refill     alpha-lipoic acid 600 MG capsule Take 600 mg by mouth 2 capsules daily       BIOTIN 1 tablet daily        CALCIUM 500 MG OR TABS   0     cholecalciferol (VITAMIN D3) 5000 UNITS CAPS capsule Take 5,000 Units by mouth daily       Copper Gluconate (COPPER CAPS) 2 MG CAPS Take 1 capsule by mouth daily       ferrous gluconate (FERGON) 324 (38 FE) MG tablet Take 1 tablet (324 mg) by mouth 2 times daily 30 tablet 0     GLUTAMINE PO Take 2 g by mouth daily       Magnesium Hydroxide (MAGNESIA PO) Take 1 tablet by mouth daily Potassium 85 mg in it, 170mg magnesium       MULTIVITAMIN TABS   OR   0     VITAMIN A EX 5000 1 tab qd        VITAMIN E 1 tablet daily.       VITAMIN K PO Take 1 tablet by mouth daily.       Zinc Acetate, Oral, (ZINC ACETATE PO)            EXAM:    Vitals: Wt 82.6 kg (182 lb)   BMI 26.88 kg/m    BMI: Body mass index is 26.88 kg/m .    Constitutional:  Kathi Linn is in no apparent distress, appears well-nourished.  Cooperative with history and physical exam.    Vascular:  Pedal pulses are palpable for both the DP and PT arteries.  CFT < 3 sec.  No edema.      Neuro: Light touch sensation is intact to the L4, L5, S1 distributions  No evidence of weakness, spasticity, or contracture in the lower extremities.     Derm: Normal texture and turgor.  No erythema, ecchymosis, or cyanosis.  No open lesions.     Musculoskeletal:    Lower extremity muscle strength is normal. No gross deformities.  She is able to extend and flex the left fifth toe  against resistance.  There is pain on palpation proximal to the toe over the fifth metatarsal neck and head.    XR TOE LEFT G/E 2 VIEWS 3/8/2022 3:41 PM     HISTORY: r/o fracture. Jamming injury this morning left 5th toe.; Pain  of toe of left foot     COMPARISON: None.     FINDINGS: No fracture or dislocation. No degenerative changes.     ZAINA LEVY MD

## 2022-06-16 ENCOUNTER — NURSE TRIAGE (OUTPATIENT)
Dept: NURSING | Facility: CLINIC | Age: 47
End: 2022-06-16
Payer: COMMERCIAL

## 2022-06-16 NOTE — TELEPHONE ENCOUNTER
Nurse Triage SBAR    Is this a 2nd Level Triage? NO    Situation: Patient calling with Hives.  Consent: on file in chart    45 minutes ago started developing hives  Does not know why she broke out in hives  Only new thing that she has eaten/drank is protein shake  Hives to her Legs, arms, Nose, mouth, neck  Itchy and warm  Feeling flushed  Hx asthma  Denies tongue swelling, difficulty breathing, fever  Notice something similar one to two weeks ago but not has extreme  Has an appt scheduled on 6/21/22    Protocol Recommended Disposition:   Home Care, See PCP Within 3 Days    Recommendation: Advised patient to make an appt . Reviewed concerning symptoms and when to call back.       Does the patient meet one of the following criteria for ADS visit consideration? 16+ years old, with an [facility] PCP       Ivanna Adams RN [facility] 6/16/2022 5:26 PM    Reason for Disposition    [1] Hives from food reaction AND [2] diagnosis never confirmed by a HCP    Widespread hives    Additional Information    Negative: [1] Life-threatening reaction (anaphylaxis) in the past to similar substance (e.g., food, insect bite/sting, chemical, etc.) AND [2] < 2 hours since exposure    Negative: Difficulty breathing or wheezing now    Negative: [1] Swollen tongue AND [2] rapid onset    Negative: [1] Hoarseness or cough now AND [2] rapid onset    Negative: Shock suspected (e.g., cold/pale/clammy skin, too weak to stand, low BP, rapid pulse)    Negative: Difficult to awaken or acting confused (e.g., disoriented, slurred speech)    Negative: Sounds like a life-threatening emergency to the triager    Negative: Swollen tongue    Negative: [1] Widespread hives, itching or facial swelling AND [2] onset < 2 hours of exposure to high-risk allergen (e.g., sting, nuts, 1st dose of antibiotic)    Negative: Patient sounds very sick or weak to the triager    Negative: [1] MODERATE-SEVERE hives persist (i.e., hives interfere with normal  activities or work) AND [2] taking antihistamine (e.g., Benadryl, Claritin) > 24 hours    Negative: [1] Hives have become worse AND [2] taking oral steroids (e.g., prednisone) > 24 hours    Negative: Joint swelling    Negative: [1] Abdominal pain AND [2] pain present > 12 hours    Negative: Fever    Negative: [1] Widespread hives, itching or facial swelling AND [2] onset > 2 hours after exposure to high-risk allergen (e.g., sting, nuts, 1st dose of antibiotic)    Protocols used: HIVES-A-

## 2022-06-21 ENCOUNTER — OFFICE VISIT (OUTPATIENT)
Dept: INTERNAL MEDICINE | Facility: CLINIC | Age: 47
End: 2022-06-21
Payer: COMMERCIAL

## 2022-06-21 VITALS
SYSTOLIC BLOOD PRESSURE: 115 MMHG | WEIGHT: 182.7 LBS | HEART RATE: 80 BPM | BODY MASS INDEX: 26.98 KG/M2 | RESPIRATION RATE: 16 BRPM | TEMPERATURE: 97.7 F | OXYGEN SATURATION: 100 % | DIASTOLIC BLOOD PRESSURE: 70 MMHG

## 2022-06-21 DIAGNOSIS — Z00.00 ROUTINE HISTORY AND PHYSICAL EXAMINATION OF ADULT: Primary | ICD-10-CM

## 2022-06-21 DIAGNOSIS — K21.9 GASTROESOPHAGEAL REFLUX DISEASE WITHOUT ESOPHAGITIS: ICD-10-CM

## 2022-06-21 DIAGNOSIS — E04.1 THYROID NODULE: ICD-10-CM

## 2022-06-21 DIAGNOSIS — Z23 HIGH PRIORITY FOR 2019-NCOV VACCINE: ICD-10-CM

## 2022-06-21 DIAGNOSIS — Z12.11 SPECIAL SCREENING FOR MALIGNANT NEOPLASMS, COLON: ICD-10-CM

## 2022-06-21 DIAGNOSIS — Z13.220 SCREENING FOR CHOLESTEROL LEVEL: ICD-10-CM

## 2022-06-21 PROCEDURE — 99396 PREV VISIT EST AGE 40-64: CPT | Mod: 25 | Performed by: INTERNAL MEDICINE

## 2022-06-21 PROCEDURE — 0054A COVID-19,PF,PFIZER (12+ YRS): CPT | Performed by: INTERNAL MEDICINE

## 2022-06-21 PROCEDURE — 91305 COVID-19,PF,PFIZER (12+ YRS): CPT | Performed by: INTERNAL MEDICINE

## 2022-06-21 RX ORDER — OMEPRAZOLE 40 MG/1
40 CAPSULE, DELAYED RELEASE ORAL DAILY
Qty: 90 CAPSULE | Refills: 0 | Status: SHIPPED | OUTPATIENT
Start: 2022-06-21

## 2022-06-21 RX ORDER — FAMOTIDINE 20 MG/1
20 TABLET, FILM COATED ORAL 2 TIMES DAILY PRN
Qty: 60 TABLET | Refills: 3 | Status: SHIPPED | OUTPATIENT
Start: 2022-06-21 | End: 2022-09-12

## 2022-06-21 ASSESSMENT — ENCOUNTER SYMPTOMS
DIZZINESS: 0
EYE PAIN: 0
ARTHRALGIAS: 1
NERVOUS/ANXIOUS: 0
FEVER: 0
NAUSEA: 1
PALPITATIONS: 0
PARESTHESIAS: 0
WEAKNESS: 0
HEMATOCHEZIA: 0
HEMATURIA: 0
JOINT SWELLING: 0
HEARTBURN: 1
ABDOMINAL PAIN: 0
DYSURIA: 0
COUGH: 1
DIARRHEA: 0
MYALGIAS: 1
SORE THROAT: 1
SHORTNESS OF BREATH: 0
HEADACHES: 0
CONSTIPATION: 0
CHILLS: 0
BREAST MASS: 0
FREQUENCY: 0

## 2022-06-21 NOTE — PROGRESS NOTES
ASSESSMENT/PLAN                                                       (Z00.00) Routine history and physical examination of adult  (primary encounter diagnosis)  Comment: PMH, PSH, FH, SH, medications, allergies, immunizations, and preventative health measures reviewed and updated as appropriate.  Plan: see below for plans.      (Z23) High priority for 2019-nCoV vaccine  Plan: COVID-19 booster given today.    (Z12.11) Special screening for malignant neoplasms, colon  Plan: screening colonoscopy ordered - patient to schedule.     (Z13.220) Screening for cholesterol level  Plan: fasting lipid profile ordered - patient to schedule.     (K21.9) Gastroesophageal reflux disease without esophagitis  Plan: 3 month trial of omeprazole 40mg daily and consistently and Pepcid twice a day as needed for acute relief of symptoms; if symptoms worsen, change, or do not improve, patient to contact MD.      (E04.1) Thyroid nodule  Plan:  Thyroid ordered - patient to schedule.     Estrella Marino MD   [address] [address]  T: [phone], F: [phone]    SERGIO Linn is a very pleasant 46 year old female who presents for a physical.    ROS:  Constitutional: no unintentional weight loss or gain reported; no fevers, chills, or sweats reported  Cardiovascular: no chest pain, palpitations, or edema reported  Respiratory: no cough, wheezing, shortness of breath, or dyspnea on exertion reported  Gastrointestinal: no nausea, vomiting, constipation, diarrhea, or abdominal pain reported  Genitourinary: no urinary frequency, urgency, dysuria, or hematuria reported  Integumentary: no rash or pruritus reported  Musculoskeletal: no back pain, muscle pain, joint pain, or joint swelling reported  Neurologic: no focal weakness, numbness, or tingling reported  Hematologic: no easy bruising or bleeding reported  Endocrine: no heat or  cold intolerance reported; no polyuria or polydipsia reported  Psychiatric: no anxiety or depression reported    Past Medical History:   Diagnosis Date     S/P gastric bypass 2007    duodenal switch     Past Surgical History:   Procedure Laterality Date     ABDOMINOPLASTY       BYPASS GASTRIC DUODENAL SWITCH  2007      SECTION      x2     CHOLECYSTECTOMY       HAND SURGERY Left     tendon-repair     HERNIORRHAPHY VENTRAL  10/02/2013    Procedure: HERNIORRHAPHY VENTRAL;  Open Ventral hernia Repair with mesh;  Surgeon: Orly Dunlap MD;  Location: [facility] OR     Family History   Problem Relation Age of Onset     Hypertension Mother      Cerebrovascular Disease Mother      Depression Mother      Osteoporosis Mother      Lymphoma Father      Hyperlipidemia Father      Diabetes Type 2  Father      Depression Sister      Hypertension Sister      Cerebrovascular Disease Maternal Grandmother      Melanoma Maternal Grandmother      Depression Maternal Grandmother      Valvular heart disease Maternal Grandmother      Hypertension Maternal Grandmother      Lung Cancer Maternal Grandfather      Lung Cancer Paternal Grandmother      Cerebrovascular Disease Paternal Aunt      Breast Cancer Paternal Aunt         post-menopausal     Impaired Fasting Glucose Paternal Aunt         x2 aunts     Ovarian Cancer No family hx of      Colon Cancer No family hx of      Social History     Occupational History     Occupation: Sales   Tobacco Use     Smoking status: Former Smoker     Years: 2.00     Types: Cigarettes     Quit date: 1998     Years since quittin.4     Smokeless tobacco: Never Used     Tobacco comment: social smoking only   Vaping Use     Vaping Use: Never used   Substance and Sexual Activity     Alcohol use: Yes     Comment: ~3 drinks/week     Drug use: No     Sexual activity: Yes     Partners: Male   Social History Narrative    .    Two adult sons.    No grandchildren.    Walking, hiking,  paddle-boarding, yoga, and pickle ball regularly.      Allergies   Allergen Reactions     Pulmicort Turbuhaler [Budesonide] Rash     Rash on face     Current Outpatient Medications   Medication Sig     alpha-lipoic acid 600 MG capsule Take 600 mg by mouth 2 capsules daily     BIOTIN 1 tablet daily      CALCIUM 500 MG OR TABS      cholecalciferol (VITAMIN D3) 5000 UNITS CAPS capsule Take 5,000 Units by mouth daily     Copper Gluconate (COPPER CAPS) 2 MG CAPS Take 1 capsule by mouth daily     famotidine (PEPCID) 20 MG tablet Take 1 tablet (20 mg) by mouth 2 times daily as needed (acid reflux, stomach upset, or bloating/gassiness)     ferrous gluconate (FERGON) 324 (38 FE) MG tablet Take 1 tablet (324 mg) by mouth 2 times daily     GLUTAMINE PO Take 2 g by mouth daily     Magnesium Hydroxide (MAGNESIA PO) Take 1 tablet by mouth daily Potassium 85 mg in it, 170mg magnesium     MULTIVITAMIN TABS   OR      omeprazole (PRILOSEC) 40 MG DR capsule Take 1 capsule (40 mg) by mouth daily     VITAMIN A EX 5000 1 tab qd      VITAMIN E 1 tablet daily.     VITAMIN K PO Take 1 tablet by mouth daily.     Zinc Acetate, Oral, (ZINC ACETATE PO)      Immunization History   Administered Date(s) Administered     COVID-19,PF,Pfizer (12+ Yrs) 05/01/2021, 05/24/2021     Influenza (IIV3) PF 11/05/2003, 11/01/2004, 02/13/2007, 09/27/2013     Influenza Vaccine IM > 6 months Valent IIV4 (Alfuria,Fluzone) 09/09/2016, 10/24/2019     TD (ADULT, 7+) 07/01/2003     TDAP Vaccine (Adacel) 10/24/2019     PREVENTATIVE HEALTH                                                      BMI: overweight  Blood pressure: within normal limits   Breast CA screening: up to date   Cervical CA screening: up to date - performed by outside ob/gyn  Colon CA screening: DUE  Lung CA screening: patient does not meet screening criteria  Dexa: not medically indicated at this time   Screening cholesterol: DUE  Screening diabetes: up to date   STD testing: no risk factors  present  Alcohol misuse screening: alcohol use reviewed - no intervention indicated at this time  Immunizations: reviewed; COVID-19 booster DUE    OBJECTIVE                                                      /70 (BP Location: Left arm, Patient Position: Chair, Cuff Size: Adult Regular)   Pulse 80   Temp 97.7  F (36.5  C) (Oral)   Resp 16   Wt 82.9 kg (182 lb 11.2 oz)   SpO2 100%   BMI 26.98 kg/m    Constitutional: well-appearing  Head, Ears, and Eyes: normocephalic; normal external auditory canal and pinna; tympanic membranes visualized and normal; normal lids and conjunctivae  Neck: supple, symmetric, no thyromegaly or lymphadenopathy  Respiratory: normal respiratory effort; clear to auscultation bilaterally  Cardiovascular: regular rate and rhythm; no edema  Gastrointestinal: soft, non-tender, and non-distended; no organomegaly or masses  Musculoskeletal: normal gait and station  Psych: normal judgment and insight; normal mood and affect; recent and remote memory intact    ---  (Note was completed, in part, with [employer] voice-recognition software. Documentation was reviewed, but some grammatical, spelling, and word errors may remain.)

## 2022-06-21 NOTE — PATIENT INSTRUCTIONS
COVID-19 booster today.    ---    Fasting lipid profile at your convenience.    ---    Please schedule screening colonoscopy at your convenience:    [facility]   [address] [place] [address] [address]   [facility] [address]   Phone: [phone]     ---    Please schedule the thyroid ultrasound at your convenience [phone].    ---    TRIAL of omeprazole 40mg daily and consistently for at least 3 months.    TRIAL of pepcid twice a day as needed for acid reflux and stomach upset.

## 2022-07-13 ENCOUNTER — HOSPITAL ENCOUNTER (OUTPATIENT)
Dept: ULTRASOUND IMAGING | Facility: CLINIC | Age: 47
Discharge: HOME | End: 2022-07-13
Attending: INTERNAL MEDICINE | Admitting: INTERNAL MEDICINE
Payer: COMMERCIAL

## 2022-07-13 DIAGNOSIS — E04.1 THYROID NODULE: ICD-10-CM

## 2022-07-13 PROCEDURE — 76536 US EXAM OF HEAD AND NECK: CPT

## 2022-09-10 DIAGNOSIS — K21.9 GASTROESOPHAGEAL REFLUX DISEASE WITHOUT ESOPHAGITIS: ICD-10-CM

## 2022-09-12 RX ORDER — FAMOTIDINE 20 MG/1
TABLET, FILM COATED ORAL
Qty: 180 TABLET | Refills: 1 | Status: SHIPPED | OUTPATIENT
Start: 2022-09-12

## 2022-10-22 ENCOUNTER — HEALTH MAINTENANCE LETTER (OUTPATIENT)
Age: 47
End: 2022-10-22

## 2022-12-21 ENCOUNTER — TRANSFERRED RECORDS (OUTPATIENT)
Dept: HEALTH INFORMATION MANAGEMENT | Facility: CLINIC | Age: 47
End: 2022-12-21

## 2023-05-22 ENCOUNTER — PATIENT OUTREACH (OUTPATIENT)
Dept: CARE COORDINATION | Facility: CLINIC | Age: 48
End: 2023-05-22
Payer: COMMERCIAL

## 2023-06-01 ENCOUNTER — HEALTH MAINTENANCE LETTER (OUTPATIENT)
Age: 48
End: 2023-06-01

## 2023-08-27 ENCOUNTER — HEALTH MAINTENANCE LETTER (OUTPATIENT)
Age: 48
End: 2023-08-27

## 2024-06-02 ENCOUNTER — HEALTH MAINTENANCE LETTER (OUTPATIENT)
Age: 49
End: 2024-06-02

## 2024-10-19 ENCOUNTER — HEALTH MAINTENANCE LETTER (OUTPATIENT)
Age: 49
End: 2024-10-19

## 2024-10-23 ENCOUNTER — TELEPHONE (OUTPATIENT)
Dept: ENDOCRINOLOGY | Facility: CLINIC | Age: 49
End: 2024-10-23
Payer: COMMERCIAL

## 2024-10-23 NOTE — TELEPHONE ENCOUNTER
General Call    Contacts       Contact Date/Time Type Contact Phone/Fax    10/23/2024 11:01 AM CDT Phone (Incoming) Kathi Linn GRACIELA (Self) [phone] (M)          Reason for Call:  New re-est wls, pt, concerned about new labs and iron levels.  Hoping someone can call to discuss before her appt.         [facility] or would you prefer to receive a phone call?:   Patient would prefer a phone call   Okay to leave a detailed message?: Yes at Cell number on file:    Telephone Information:   Mobile [phone]

## 2024-10-31 NOTE — TELEPHONE ENCOUNTER
REFERRAL INFORMATION:  Referring Provider:  Self Referral  Referring Clinic:    Reason for Visit/Diagnosis: NEW RE-EST WLS, 2007 duod/switch Nickie, pt check, self referral        FUTURE VISIT INFORMATION:  Appointment Date: 11/7/24  Appointment Time:      NOTES RECORD STATUS  DETAILS   OFFICE NOTE from Referring Provider N/A    OFFICE NOTE from Other Specialists Care Everywhere    OPERATIVE REPORT Internal/Care Everywhere 7/24/2007- Open duodenal switch with liver biopsy. -Dr. Dr. Fu    11/15/19-VIRAJ KEITH  -Dr. Broderick    10/2/13-Open Ventral hernia Repair with mesh -Dr. Dunlap   PERTINENT LABS Care Everywhere      Records Requested    Facility    Fax:    Outcome

## 2024-11-07 ENCOUNTER — PRE VISIT (OUTPATIENT)
Dept: ENDOCRINOLOGY | Facility: CLINIC | Age: 49
End: 2024-11-07

## 2024-11-10 NOTE — TELEPHONE ENCOUNTER
Pt sent another my chart-Rx pending if ok       Per 7/12 my chart-I sent the following message earlier this week.  I am following up on this as I will be on vacation for the week starting on Saturday.      Could I get a prescription for Elidel cream? I'm not certain if you recall but during my appointment in May I had a rash on my face. You suggested I continue to use Cortisone cream. I did and it got better and then got worse. I have a dermatology appointment but not until July 24th.    Dr. Oates had given me Elidel samples in the past and they are working for my rash.    I would like to keep my dermo appointment but was hoping you could help in the meantime.    Thank you,  Kathi   10-Nov-2024 18:21

## 2025-03-22 ENCOUNTER — HEALTH MAINTENANCE LETTER (OUTPATIENT)
Age: 50
End: 2025-03-22

## 2025-06-14 ENCOUNTER — HEALTH MAINTENANCE LETTER (OUTPATIENT)
Age: 50
End: 2025-06-14